# Patient Record
Sex: MALE | Race: WHITE | Employment: OTHER | URBAN - METROPOLITAN AREA
[De-identification: names, ages, dates, MRNs, and addresses within clinical notes are randomized per-mention and may not be internally consistent; named-entity substitution may affect disease eponyms.]

---

## 2017-03-28 PROBLEM — D47.2 MGUS (MONOCLONAL GAMMOPATHY OF UNKNOWN SIGNIFICANCE): Status: ACTIVE | Noted: 2017-03-28

## 2018-10-17 ENCOUNTER — HOSPITAL ENCOUNTER (OUTPATIENT)
Dept: CARDIOLOGY | Age: 78
Discharge: HOME | End: 2018-10-17
Payer: COMMERCIAL

## 2018-10-17 DIAGNOSIS — I38 VALVULAR HEART DISEASE: ICD-10-CM

## 2018-10-17 LAB
LV EF: 68 %
LVEF MODALITY: NORMAL

## 2018-10-17 PROCEDURE — 93306 TTE W/DOPPLER COMPLETE: CPT

## 2019-05-05 ENCOUNTER — APPOINTMENT (OUTPATIENT)
Dept: CT IMAGING | Age: 79
End: 2019-05-05
Payer: COMMERCIAL

## 2019-05-05 ENCOUNTER — APPOINTMENT (OUTPATIENT)
Dept: GENERAL RADIOLOGY | Age: 79
End: 2019-05-05
Payer: COMMERCIAL

## 2019-05-05 ENCOUNTER — HOSPITAL ENCOUNTER (OUTPATIENT)
Age: 79
Setting detail: OBSERVATION
Discharge: HOME | End: 2019-05-08
Attending: EMERGENCY MEDICINE | Admitting: INTERNAL MEDICINE
Payer: COMMERCIAL

## 2019-05-05 DIAGNOSIS — R55 NEAR SYNCOPE: Primary | ICD-10-CM

## 2019-05-05 DIAGNOSIS — R11.0 NAUSEA: ICD-10-CM

## 2019-05-05 LAB
ALBUMIN SERPL-MCNC: 3.9 G/DL (ref 3.5–5.2)
ALP BLD-CCNC: 81 U/L (ref 40–129)
ALT SERPL-CCNC: 24 U/L (ref 0–40)
ANION GAP SERPL CALCULATED.3IONS-SCNC: 10 MMOL/L (ref 7–16)
APTT: 27.4 SEC (ref 24.5–35.1)
AST SERPL-CCNC: 26 U/L (ref 0–39)
BACTERIA: ABNORMAL /HPF
BASOPHILS ABSOLUTE: 0.03 E9/L (ref 0–0.2)
BASOPHILS RELATIVE PERCENT: 0.5 % (ref 0–2)
BILIRUB SERPL-MCNC: 0.4 MG/DL (ref 0–1.2)
BILIRUBIN URINE: NEGATIVE
BLOOD, URINE: ABNORMAL
BUN BLDV-MCNC: 17 MG/DL (ref 8–23)
CALCIUM SERPL-MCNC: 8.8 MG/DL (ref 8.6–10.2)
CHLORIDE BLD-SCNC: 106 MMOL/L (ref 98–107)
CLARITY: CLEAR
CO2: 24 MMOL/L (ref 22–29)
COLOR: YELLOW
CREAT SERPL-MCNC: 0.8 MG/DL (ref 0.7–1.2)
EKG ATRIAL RATE: 62 BPM
EKG P AXIS: 23 DEGREES
EKG P-R INTERVAL: 170 MS
EKG Q-T INTERVAL: 442 MS
EKG QRS DURATION: 100 MS
EKG QTC CALCULATION (BAZETT): 448 MS
EKG R AXIS: 53 DEGREES
EKG T AXIS: 58 DEGREES
EKG VENTRICULAR RATE: 62 BPM
EOSINOPHILS ABSOLUTE: 0.06 E9/L (ref 0.05–0.5)
EOSINOPHILS RELATIVE PERCENT: 1.1 % (ref 0–6)
GFR AFRICAN AMERICAN: >60
GFR NON-AFRICAN AMERICAN: >60 ML/MIN/1.73
GLUCOSE BLD-MCNC: 139 MG/DL (ref 74–99)
GLUCOSE URINE: NEGATIVE MG/DL
HCT VFR BLD CALC: 39 % (ref 37–54)
HEMOGLOBIN: 13.3 G/DL (ref 12.5–16.5)
IMMATURE GRANULOCYTES #: 0.02 E9/L
IMMATURE GRANULOCYTES %: 0.4 % (ref 0–5)
INR BLD: 1.1
KETONES, URINE: NEGATIVE MG/DL
LACTIC ACID: 1.7 MMOL/L (ref 0.5–2.2)
LEUKOCYTE ESTERASE, URINE: NEGATIVE
LYMPHOCYTES ABSOLUTE: 1.11 E9/L (ref 1.5–4)
LYMPHOCYTES RELATIVE PERCENT: 20.1 % (ref 20–42)
MAGNESIUM: 1.9 MG/DL (ref 1.6–2.6)
MCH RBC QN AUTO: 32.4 PG (ref 26–35)
MCHC RBC AUTO-ENTMCNC: 34.1 % (ref 32–34.5)
MCV RBC AUTO: 95.1 FL (ref 80–99.9)
MONOCYTES ABSOLUTE: 0.4 E9/L (ref 0.1–0.95)
MONOCYTES RELATIVE PERCENT: 7.2 % (ref 2–12)
NEUTROPHILS ABSOLUTE: 3.91 E9/L (ref 1.8–7.3)
NEUTROPHILS RELATIVE PERCENT: 70.7 % (ref 43–80)
NITRITE, URINE: NEGATIVE
PDW BLD-RTO: 13 FL (ref 11.5–15)
PH UA: 6 (ref 5–9)
PLATELET # BLD: 148 E9/L (ref 130–450)
PMV BLD AUTO: 10.8 FL (ref 7–12)
POTASSIUM SERPL-SCNC: 4.2 MMOL/L (ref 3.5–5)
PRO-BNP: 453 PG/ML (ref 0–450)
PROTEIN UA: 30 MG/DL
PROTHROMBIN TIME: 12.5 SEC (ref 9.3–12.4)
RBC # BLD: 4.1 E12/L (ref 3.8–5.8)
RBC UA: ABNORMAL /HPF (ref 0–2)
SODIUM BLD-SCNC: 140 MMOL/L (ref 132–146)
SPECIFIC GRAVITY UA: 1.02 (ref 1–1.03)
TOTAL PROTEIN: 7 G/DL (ref 6.4–8.3)
TROPONIN: <0.01 NG/ML (ref 0–0.03)
TROPONIN: <0.01 NG/ML (ref 0–0.03)
UROBILINOGEN, URINE: 0.2 E.U./DL
WBC # BLD: 5.5 E9/L (ref 4.5–11.5)
WBC UA: ABNORMAL /HPF (ref 0–5)

## 2019-05-05 PROCEDURE — 83880 ASSAY OF NATRIURETIC PEPTIDE: CPT

## 2019-05-05 PROCEDURE — 36415 COLL VENOUS BLD VENIPUNCTURE: CPT

## 2019-05-05 PROCEDURE — 83605 ASSAY OF LACTIC ACID: CPT

## 2019-05-05 PROCEDURE — 93010 ELECTROCARDIOGRAM REPORT: CPT | Performed by: INTERNAL MEDICINE

## 2019-05-05 PROCEDURE — 85730 THROMBOPLASTIN TIME PARTIAL: CPT

## 2019-05-05 PROCEDURE — 81001 URINALYSIS AUTO W/SCOPE: CPT

## 2019-05-05 PROCEDURE — 85025 COMPLETE CBC W/AUTO DIFF WBC: CPT

## 2019-05-05 PROCEDURE — 70450 CT HEAD/BRAIN W/O DYE: CPT

## 2019-05-05 PROCEDURE — 71045 X-RAY EXAM CHEST 1 VIEW: CPT

## 2019-05-05 PROCEDURE — 6370000000 HC RX 637 (ALT 250 FOR IP): Performed by: INTERNAL MEDICINE

## 2019-05-05 PROCEDURE — 96361 HYDRATE IV INFUSION ADD-ON: CPT

## 2019-05-05 PROCEDURE — G0378 HOSPITAL OBSERVATION PER HR: HCPCS

## 2019-05-05 PROCEDURE — 84484 ASSAY OF TROPONIN QUANT: CPT

## 2019-05-05 PROCEDURE — 83735 ASSAY OF MAGNESIUM: CPT

## 2019-05-05 PROCEDURE — 99285 EMERGENCY DEPT VISIT HI MDM: CPT

## 2019-05-05 PROCEDURE — 80053 COMPREHEN METABOLIC PANEL: CPT

## 2019-05-05 PROCEDURE — 6360000002 HC RX W HCPCS: Performed by: PHYSICIAN ASSISTANT

## 2019-05-05 PROCEDURE — 85610 PROTHROMBIN TIME: CPT

## 2019-05-05 PROCEDURE — 2580000003 HC RX 258: Performed by: PHYSICIAN ASSISTANT

## 2019-05-05 PROCEDURE — 93005 ELECTROCARDIOGRAM TRACING: CPT | Performed by: PHYSICIAN ASSISTANT

## 2019-05-05 PROCEDURE — 96374 THER/PROPH/DIAG INJ IV PUSH: CPT

## 2019-05-05 RX ORDER — 0.9 % SODIUM CHLORIDE 0.9 %
500 INTRAVENOUS SOLUTION INTRAVENOUS ONCE
Status: COMPLETED | OUTPATIENT
Start: 2019-05-05 | End: 2019-05-05

## 2019-05-05 RX ORDER — COLCHICINE 0.6 MG/1
0.6 TABLET ORAL PRN
Status: DISCONTINUED | OUTPATIENT
Start: 2019-05-05 | End: 2019-05-08 | Stop reason: HOSPADM

## 2019-05-05 RX ORDER — ONDANSETRON 2 MG/ML
4 INJECTION INTRAMUSCULAR; INTRAVENOUS ONCE
Status: COMPLETED | OUTPATIENT
Start: 2019-05-05 | End: 2019-05-05

## 2019-05-05 RX ORDER — ONDANSETRON 2 MG/ML
4 INJECTION INTRAMUSCULAR; INTRAVENOUS ONCE
Status: DISCONTINUED | OUTPATIENT
Start: 2019-05-05 | End: 2019-05-08 | Stop reason: HOSPADM

## 2019-05-05 RX ORDER — AMLODIPINE BESYLATE 5 MG/1
5 TABLET ORAL DAILY
Status: DISCONTINUED | OUTPATIENT
Start: 2019-05-05 | End: 2019-05-05

## 2019-05-05 RX ORDER — AMLODIPINE BESYLATE 5 MG/1
5 TABLET ORAL 2 TIMES DAILY
Status: DISCONTINUED | OUTPATIENT
Start: 2019-05-05 | End: 2019-05-06

## 2019-05-05 RX ORDER — LISINOPRIL 20 MG/1
20 TABLET ORAL DAILY
Status: DISCONTINUED | OUTPATIENT
Start: 2019-05-05 | End: 2019-05-08 | Stop reason: HOSPADM

## 2019-05-05 RX ORDER — M-VIT,TX,IRON,MINS/CALC/FOLIC 27MG-0.4MG
1 TABLET ORAL DAILY
Status: DISCONTINUED | OUTPATIENT
Start: 2019-05-05 | End: 2019-05-08 | Stop reason: HOSPADM

## 2019-05-05 RX ORDER — LORATADINE 10 MG/1
10 TABLET ORAL PRN
COMMUNITY
End: 2020-10-09

## 2019-05-05 RX ORDER — CETIRIZINE HYDROCHLORIDE 10 MG/1
5 TABLET ORAL DAILY
Status: DISCONTINUED | OUTPATIENT
Start: 2019-05-05 | End: 2019-05-08 | Stop reason: HOSPADM

## 2019-05-05 RX ADMIN — AMLODIPINE BESYLATE 5 MG: 5 TABLET ORAL at 23:03

## 2019-05-05 RX ADMIN — SODIUM CHLORIDE 500 ML: 9 INJECTION, SOLUTION INTRAVENOUS at 14:29

## 2019-05-05 RX ADMIN — ONDANSETRON HYDROCHLORIDE 4 MG: 2 SOLUTION INTRAMUSCULAR; INTRAVENOUS at 14:29

## 2019-05-05 ASSESSMENT — PAIN SCALES - GENERAL
PAINLEVEL_OUTOF10: 0
PAINLEVEL_OUTOF10: 0

## 2019-05-05 NOTE — ED PROVIDER NOTES
Seen with attending:      HPI:  5/5/19, Time: 24 Paxton Rivas is a 78 y.o. male presenting to the ED for dizziness with near sycope, beginning when bending over outdoors today. The complaint has been persistent, moderate in severity, and worsened when bending forward. He did have some nausea, but no vomiting. He reports that he had breakfast this morning and was outdoors with his grandchildren picking weeds when he bent over slightly and he felt dizzy. He states he went towards the house and leaned against the house and he felt like everything was spinning. He states he did not fall and strike his head. He reports he has never had anything like this in the past. He states that earlier this week, he was feeling somewhat \"weak\". He is not take blood thinners. He is a diabetic and does have hypertension. He does have a history of a heart murmur and did have an echo within the last year. He reports a history of known coronary disease and denies having any chest discomfort or shortness of breath today with his symptoms. He does admit to drinking some alcohol daily and he does not smoke tobacco.    ROS:   Pertinent positives and negatives are stated within the HPI, all other systems reviewed and are negative.    --------------------------------------------- PAST HISTORY ---------------------------------------------  Past Medical History:  has a past medical history of Diabetes mellitus ([facility] [address] Erectile dysfunction, Gout, Hypertension, and Vitamin D deficiency. Past Surgical History:  has a past surgical history that includes Appendectomy (2982); Tonsillectomy; Cataract removal with implant (Left, 6/6/13); hernia repair (1970); and Cataract removal with implant (10/31/13). Social History:  reports that he has quit smoking. He quit after 15.00 years of use. He quit smokeless tobacco use about 48 years ago. He reports that he drinks alcohol. He reports that he does not use drugs.     Family History: family history is not on file. The patients home medications have been reviewed. Allergies: Patient has no known allergies. ---------------------------------------------------PHYSICAL EXAM--------------------------------------    Constitutional/General: Alert and oriented x3, stable and well appearing, non toxic in NAD  Head: NC/AT  Eyes: PERRL, EOMI, no nystagmus. Ears and Nose: no discharge or bleeding  Mouth: Oropharynx clear, tongue midline, handling secretions, no trismus  Neck: Supple, full ROM, unable to reproduce symptoms of dizziness flexion/extension or lateral rotation. Pulmonary: Lungs clear to auscultation bilaterally, no rhonchi or wheezes. Not in respiratory distress  Cardiovascular:  Regular rate. Regular rhythm. 3/6 systolic murmur noted. 2+ distal pulses  Chest: no chest wall tenderness  Abdomen: Soft. Non tender. Non distended. +BS. No rebound or guarding. No pulsatile masses appreciated. Musculoskeletal: Moves all extremities x 4. Warm and well perfused, no pitting edema. Capillary refill <3 seconds  Skin: warm and dry. No rashes. Neurologic: GCS 15, CN 2-12 grossly intact, no focal deficits, symmetric strength 5/5 in the upper and lower extremities bilaterally  Psych: Normal Affect    NIH Stroke Scale/Score at time of initial evaluation:  1A: Level of Consciousness 0 - alert; keenly responsive   1B: Ask Month and Age 0 - answers both questions correctly   1C:  Tell Patient To Open and Close Eyes, then Hand  Squeeze 0 - performs both tasks correctly   2: Test Horizontal Extraocular Movements 0 - normal   3: Test Visual Fields 0 - no visual loss   4: Test Facial Palsy 0 - normal symmetric movement   5A: Test Left Arm Motor Drift 0 - no drift, limb holds 90 (or 45) degrees for full 10 seconds   5B: Test Right Arm Motor Drift 0 - no drift, limb holds 90 (or 45) degrees for full 10 seconds   6A: Test Left Leg Motor Drift 0 - no drift; leg holds 30 degree position for full 5 seconds 6B: Test Right Leg Motor Drift 0 - no drift; leg holds 30 degree position for full 5 seconds   7: Test Limb Ataxia   (FNF/Heel-Shin) 0 - absent   8: Test Sensation 0 - normal; no sensory loss   9: Test Language/Aphasia 0 - no aphasia, normal   10: Test Dysarthria 0 - normal   11: Test Extinction/Inattention 0 - no abnormality   Total 0       -------------------------------------------------- RESULTS -------------------------------------------------  I have personally reviewed all laboratory and imaging results for this patient. Results are listed below.      LABS:  Results for orders placed or performed during the hospital encounter of 05/05/19   CBC auto differential   Result Value Ref Range    WBC 5.5 4.5 - 11.5 E9/L    RBC 4.10 3.80 - 5.80 E12/L    Hemoglobin 13.3 12.5 - 16.5 g/dL    Hematocrit 39.0 37.0 - 54.0 %    MCV 95.1 80.0 - 99.9 fL    MCH 32.4 26.0 - 35.0 pg    MCHC 34.1 32.0 - 34.5 %    RDW 13.0 11.5 - 15.0 fL    Platelets 640 766 - 301 E9/L    MPV 10.8 7.0 - 12.0 fL    Neutrophils % 70.7 43.0 - 80.0 %    Immature Granulocytes % 0.4 0.0 - 5.0 %    Lymphocytes % 20.1 20.0 - 42.0 %    Monocytes % 7.2 2.0 - 12.0 %    Eosinophils % 1.1 0.0 - 6.0 %    Basophils % 0.5 0.0 - 2.0 %    Neutrophils # 3.91 1.80 - 7.30 E9/L    Immature Granulocytes # 0.02 E9/L    Lymphocytes # 1.11 (L) 1.50 - 4.00 E9/L    Monocytes # 0.40 0.10 - 0.95 E9/L    Eosinophils # 0.06 0.05 - 0.50 E9/L    Basophils # 0.03 0.00 - 0.20 E9/L   Comprehensive Metabolic Panel   Result Value Ref Range    Sodium 140 132 - 146 mmol/L    Potassium 4.2 3.5 - 5.0 mmol/L    Chloride 106 98 - 107 mmol/L    CO2 24 22 - 29 mmol/L    Anion Gap 10 7 - 16 mmol/L    Glucose 139 (H) 74 - 99 mg/dL    BUN 17 8 - 23 mg/dL    CREATININE 0.8 0.7 - 1.2 mg/dL    GFR Non-African American >60 >=60 mL/min/1.73    GFR African American >60     Calcium 8.8 8.6 - 10.2 mg/dL    Total Protein 7.0 6.4 - 8.3 g/dL    Alb 3.9 3.5 - 5.2 g/dL    Total Bilirubin 0.4 0.0 - 1.2 mg/dL Alkaline Phosphatase 81 40 - 129 U/L    ALT 24 0 - 40 U/L    AST 26 0 - 39 U/L   Lactic Acid, Plasma   Result Value Ref Range    Lactic Acid 1.7 0.5 - 2.2 mmol/L   Magnesium   Result Value Ref Range    Magnesium 1.9 1.6 - 2.6 mg/dL   Troponin   Result Value Ref Range    Troponin <0.01 0.00 - 0.03 ng/mL   Brain Natriuretic Peptide   Result Value Ref Range    Pro- (H) 0 - 450 pg/mL   Protime-INR   Result Value Ref Range    Protime 12.5 (H) 9.3 - 12.4 sec    INR 1.1    APTT   Result Value Ref Range    aPTT 27.4 24.5 - 35.1 sec   EKG 12 Lead   Result Value Ref Range    Ventricular Rate 62 BPM    Atrial Rate 62 BPM    P-R Interval 170 ms    QRS Duration 100 ms    Q-T Interval 442 ms    QTc Calculation (Bazett) 448 ms    P Axis 23 degrees    R Axis 53 degrees    T Axis 58 degrees       RADIOLOGY:  Interpreted by Radiologist.  CT HEAD WO CONTRAST   Final Result      No evidence for acute intracranial process. Cortical atrophy and chronic periventricular microangiopathy. XR CHEST PORTABLE   Final Result      NO ACUTE CARDIOPULMONARY PROCESS             EKG:  This EKG is signed and interpreted by the EP. Time: 1410  Rate: 62  Rhythm: Sinus and occasional PACs  Interpretation: non-specific EKG  Comparison: no previous EKG      ------------------------- NURSING NOTES AND VITALS REVIEWED ---------------------------   The nursing notes within the ED encounter and vital signs as below have been reviewed by myself. BP (!) 186/82   Pulse 61   Temp 97.1 °F (36.2 °C) (Temporal)   Resp 16   Ht 6' 3\" (1.905 m)   Wt 208 lb 1.6 oz (94.4 kg)   SpO2 97%   BMI 26.01 kg/m²   Oxygen Saturation Interpretation: Normal    The patients available past medical records and past encounters were reviewed.       ------------------------------ ED COURSE/MEDICAL DECISION MAKING----------------------  Medications   ondansetron (ZOFRAN) injection 4 mg (has no administration in time range)   0.9 % sodium chloride bolus (500 mLs Intravenous New Bag 5/5/19 1429)   ondansetron (ZOFRAN) injection 4 mg (4 mg Intravenous Given 5/5/19 1429)         Medical Decision Making:    Patient to ER with complaints of dizziness and nausea when bending over outdoors today. Re-Evaluations:             Re-evaluation. Patients symptoms stable  Patient  advised of need to check labs, urine as well as CT imaging and CXR. IVF and Zofran given, will monitor. Patient orthostatic vitals noted and stable. 4PM Patient states he is feeling improved after 2nd dose of Zofran given in ER. Patient having no episodes of vomiting noted. Patient advised of stable CT findings as well as laboratory studies. Family concerned as they feel his abdomen is bloated, patient states his abdomen is fine and he is having no pain at this time, but still continues to complain of nausea and feeling weak. Patient agrees with admission for observation and family agrees. Consultations:             Dr. Max Reyes spoke with Dr. Nuria Aguirre and he agrees with admission for observation. This patient's ED course included: a personal history and physicial examination, re-evaluation prior to disposition, multiple bedside re-evaluations, IV medications, cardiac monitoring, continuous pulse oximetry and complex medical decision making and emergency management    This patient has remained hemodynamically stable during their ED course. Counseling: The emergency provider has spoken with the patient and family members and discussed todays results, in addition to providing specific details for the plan of care and counseling regarding the diagnosis and prognosis. Questions are answered at this time and they are agreeable with the plan.     --------------------------------- IMPRESSION AND DISPOSITION ---------------------------------    IMPRESSION  1. Near syncope    2.  Nausea        DISPOSITION  Disposition: Admit to telemetry  Patient condition is stable          Shaan Sr

## 2019-05-06 LAB
ALBUMIN SERPL-MCNC: 3.9 G/DL (ref 3.5–5.2)
ALP BLD-CCNC: 74 U/L (ref 40–129)
ALT SERPL-CCNC: 20 U/L (ref 0–40)
ANION GAP SERPL CALCULATED.3IONS-SCNC: 11 MMOL/L (ref 7–16)
AST SERPL-CCNC: 20 U/L (ref 0–39)
BILIRUB SERPL-MCNC: 0.5 MG/DL (ref 0–1.2)
BUN BLDV-MCNC: 15 MG/DL (ref 8–23)
CALCIUM SERPL-MCNC: 9.1 MG/DL (ref 8.6–10.2)
CHLORIDE BLD-SCNC: 104 MMOL/L (ref 98–107)
CO2: 25 MMOL/L (ref 22–29)
CREAT SERPL-MCNC: 1 MG/DL (ref 0.7–1.2)
GFR AFRICAN AMERICAN: >60
GFR NON-AFRICAN AMERICAN: >60 ML/MIN/1.73
GLUCOSE BLD-MCNC: 102 MG/DL (ref 74–99)
HCT VFR BLD CALC: 38.9 % (ref 37–54)
HEMOGLOBIN: 13 G/DL (ref 12.5–16.5)
MCH RBC QN AUTO: 32.5 PG (ref 26–35)
MCHC RBC AUTO-ENTMCNC: 33.4 % (ref 32–34.5)
MCV RBC AUTO: 97.3 FL (ref 80–99.9)
PDW BLD-RTO: 13 FL (ref 11.5–15)
PLATELET # BLD: 155 E9/L (ref 130–450)
PMV BLD AUTO: 10.7 FL (ref 7–12)
POTASSIUM SERPL-SCNC: 4.1 MMOL/L (ref 3.5–5)
RBC # BLD: 4 E12/L (ref 3.8–5.8)
SODIUM BLD-SCNC: 140 MMOL/L (ref 132–146)
TOTAL PROTEIN: 6.6 G/DL (ref 6.4–8.3)
TROPONIN: <0.01 NG/ML (ref 0–0.03)
WBC # BLD: 8 E9/L (ref 4.5–11.5)

## 2019-05-06 PROCEDURE — 85027 COMPLETE CBC AUTOMATED: CPT

## 2019-05-06 PROCEDURE — 80053 COMPREHEN METABOLIC PANEL: CPT

## 2019-05-06 PROCEDURE — 36415 COLL VENOUS BLD VENIPUNCTURE: CPT

## 2019-05-06 PROCEDURE — 6370000000 HC RX 637 (ALT 250 FOR IP): Performed by: INTERNAL MEDICINE

## 2019-05-06 PROCEDURE — 84484 ASSAY OF TROPONIN QUANT: CPT

## 2019-05-06 PROCEDURE — APPSS45 APP SPLIT SHARED TIME 31-45 MINUTES: Performed by: NURSE PRACTITIONER

## 2019-05-06 PROCEDURE — G0378 HOSPITAL OBSERVATION PER HR: HCPCS

## 2019-05-06 RX ORDER — NIFEDIPINE 30 MG/1
30 TABLET, EXTENDED RELEASE ORAL DAILY
Status: DISCONTINUED | OUTPATIENT
Start: 2019-05-06 | End: 2019-05-08 | Stop reason: HOSPADM

## 2019-05-06 RX ADMIN — AMLODIPINE BESYLATE 5 MG: 5 TABLET ORAL at 21:32

## 2019-05-06 RX ADMIN — CETIRIZINE HYDROCHLORIDE 5 MG: 10 TABLET, FILM COATED ORAL at 09:21

## 2019-05-06 RX ADMIN — MULTIPLE VITAMINS W/ MINERALS TAB 1 TABLET: TAB at 09:20

## 2019-05-06 RX ADMIN — AMLODIPINE BESYLATE 5 MG: 5 TABLET ORAL at 09:20

## 2019-05-06 RX ADMIN — METFORMIN HYDROCHLORIDE 500 MG: 500 TABLET ORAL at 09:21

## 2019-05-06 RX ADMIN — VITAMIN D, TAB 1000IU (100/BT) 1000 UNITS: 25 TAB at 09:20

## 2019-05-06 RX ADMIN — LISINOPRIL 20 MG: 20 TABLET ORAL at 09:21

## 2019-05-06 RX ADMIN — NIFEDIPINE 30 MG: 30 TABLET, FILM COATED, EXTENDED RELEASE ORAL at 23:10

## 2019-05-06 ASSESSMENT — PAIN SCALES - GENERAL
PAINLEVEL_OUTOF10: 0

## 2019-05-06 NOTE — PLAN OF CARE
Problem: Falls - Risk of:  Goal: Will remain free from falls  Description  Will remain free from falls  5/5/2019 2205 by Glynn Membreno RN  Outcome: Met This Shift  5/5/2019 1901 by Toni Reyes RN  Outcome: Met This Shift     Problem: Safety:  Goal: Free from accidental physical injury  Description  Free from accidental physical injury  Outcome: Met This Shift

## 2019-05-06 NOTE — H&P
Guicho Dean is a 78 y.o. male  Chief Complaint   Patient presents with    Dizziness     and nausea beginning today, near syncopal episode while bending over outside; given Zofran 4mg PTA     HPI  As above, this am he is feeling better, asking to go home. He has no cp, sob, n/v.  No current facility-administered medications on file prior to encounter.       Current Outpatient Medications on File Prior to Encounter   Medication Sig Dispense Refill    vitamin D (CHOLECALCIFEROL) 1000 UNIT TABS tablet Take 1,000 Units by mouth daily      loratadine (CLARITIN) 10 MG tablet Take 10 mg by mouth as needed      metFORMIN (GLUCOPHAGE) 500 MG tablet Take 1 tablet by mouth daily (with breakfast) 90 tablet 3    lisinopril (PRINIVIL;ZESTRIL) 20 MG tablet Take 1 tablet by mouth daily Indications: AM Instructed to take with sip water am of procedure 90 tablet 3    colchicine (COLCRYS) 0.6 MG tablet Take 1 tablet by mouth daily (Patient taking differently: Take 0.6 mg by mouth as needed ) 30 tablet 3    amLODIPine (NORVASC) 5 MG tablet Take 1 tablet by mouth daily Indications: AM Instructed to take with sip water am of procedure 90 tablet 3    Multiple Vitamins-Minerals (THERAPEUTIC MULTIVITAMIN-MINERALS) tablet Take 1 tablet by mouth daily      metroNIDAZOLE (METROGEL) 0.75 % gel apply topically 2 times daily 45 g 1     Past Medical History:   Diagnosis Date    Diabetes mellitus ([address]     states glucose is running normal    Erectile dysfunction     Gout     Hypertension     states doing well    Vitamin D deficiency        ROS  Patient positive for  syncope  Patient denies any fever, chills, night sweats, weight changes   Denies headache, visual changes,   Denies dysphagia, odynophagia dysphonia,   Denies SOB, cough, sputum production,   Denies chest pain, pressure, orthopnea, palpitations,   Denies abd pain, N/V/D/C/melena, hematochezia,   Denies urinary frequency, urgency, dysuria, hematuria,   Denies any acute muscle aches, paresthesias, weakness, seizure,  Denies depression, anxiety. OBJECTIVE  Vitals:    05/06/19 0000   BP: (!) 140/70   Pulse: 61   Resp: 18   Temp: 98.2 °F (36.8 °C)   SpO2: 96%     Gen: AO3, NAD  Head: AT/NC, PERRL, EOMIx2, no icterus, conjunctival injection  Neck: No JVD, carotid bruits, LAD, thyroid non-palpable  Heart: RRR with no murmurs, rubs, gallops  Lungs: CTA B/L, no W/R/R  Abd: soft, NT, ND, BS+, no G/R, no HSM  Ext: No C/C/E, pulses intact distally B/L  Neuro: CN 2-12 grossly intact with no focal deficits    ASSESSMENT  1. Near syncope    2. Nausea    3.  HTN  4. DM2  PLAN  Will see how he does with ambulating

## 2019-05-06 NOTE — PLAN OF CARE
Problem: Falls - Risk of:  Goal: Will remain free from falls  Description  Will remain free from falls  Outcome: Met This Shift     Problem: Daily Care:  Goal: Daily care needs are met  Description  Daily care needs are met  Outcome: Met This Shift

## 2019-05-06 NOTE — PROGRESS NOTES
Jered sent to [facility] cardiology for new consult. Currently unaware of why Dr. Araceli Ardon was removed from care team as he was consulted yesterday.

## 2019-05-06 NOTE — PLAN OF CARE
Problem: Falls - Risk of:  Goal: Will remain free from falls  Description  Will remain free from falls  5/6/2019 0052 by J Carlos Khanna RN  Outcome: Met This Shift  5/5/2019 2205 by Macy Ayala RN  Outcome: Met This Shift  5/5/2019 1901 by Elisa Johns RN  Outcome: Met This Shift  Goal: Absence of physical injury  Description  Absence of physical injury  Outcome: Met This Shift     Problem: Safety:  Goal: Free from accidental physical injury  Description  Free from accidental physical injury  5/6/2019 0052 by J Carlos Khanna RN  Outcome: Met This Shift  5/5/2019 2205 by Macy Ayala RN  Outcome: Met This Shift  Goal: Free from intentional harm  Description  Free from intentional harm  Outcome: Met This Shift     Problem: Daily Care:  Goal: Daily care needs are met  Description  Daily care needs are met  Outcome: Met This Shift     Problem: Skin Integrity:  Goal: Skin integrity will stabilize  Description  Skin integrity will stabilize  Outcome: Met This Shift

## 2019-05-06 NOTE — CONSULTS
mg, 5 mg, Oral, Daily  metFORMIN (GLUCOPHAGE) tablet 500 mg, 500 mg, Oral, Daily with breakfast  therapeutic multivitamin-minerals 1 tablet, 1 tablet, Oral, Daily  vitamin D (CHOLECALCIFEROL) tablet 1,000 Units, 1,000 Units, Oral, Daily  colchicine (COLCRYS) tablet 0.6 mg, 0.6 mg, Oral, PRN  amLODIPine (NORVASC) tablet 5 mg, 5 mg, Oral, BID    Allergies:  Patient has no known allergies. REVIEW OF SYSTEMS:     · Constitutional: Denies fatigue, fevers, chills or night sweats  · Eyes: Denies visual changes or drainage  · ENT: Denies headaches or hearing loss. No mouth sores or sore throat. No epistaxis   · Cardiovascular: Denies chest pain, pressure or palpitations. No lower extremity swelling. · Respiratory: Denies ARREOLA, cough, orthopnea or PND. No hemoptysis   · Gastrointestinal: Denies hematemesis or anorexia. No hematochezia or melena    · Genitourinary: Denies urgency, dysuria or hematuria. · Musculoskeletal: Denies gait disturbance, weakness or joint complaints  · Integumentary: Denies rash, hives or pruritis   · Neurological: Denies dizziness, headaches or seizures. No numbness or tingling  · Psychiatric: Denies anxiety or depression. · Endocrine: Denies temperature intolerance. No recent weight change. .  · Hematologic/Lymphatic: Denies abnormal bruising or bleeding. No swollen lymph nodes    PHYSICAL EXAM:   BP (!) 161/73   Pulse 60   Temp 97.7 °F (36.5 °C) (Temporal)   Resp 18   Ht 6' 3\" (1.905 m)   Wt 205 lb 4.8 oz (93.1 kg)   SpO2 97%   BMI 25.66 kg/m²   CONST:  Well developed, well nourished who appears of stated age. Awake, alert and cooperative. No apparent distress. HEENT:   Head- Normocephalic, atraumatic   Eyes- Conjunctivae pink, anicteric  Throat- Oral mucosa pink and moist  Neck-  No stridor, trachea midline, no jugular venous distention. Positive for left carotid bruit. CHEST: Chest symmetrical and non-tender to palpation.  No accessory muscle use or intercostal retractions  RESPIRATORY: Lung sounds - clear throughout fields   CARDIOVASCULAR:     Heart Inspection- shows no noted pulsations  Heart Palpation- no heaves or thrills; PMI is non-displaced    Heart Ausculation- Regular rate and rhythm, 2/6 systolic murmur. No s3, s4 or rub   PV: No lower extremity edema. No varicosities. Pedal pulses palpable, no clubbing or cyanosis   ABDOMEN: Soft, non-tender to light palpation. Bowel sounds present. No palpable masses no organomegaly; no abdominal bruit  MS: Good muscle strength and tone. No atrophy or abnormal movements. : Deferred  SKIN: Warm and dry no statis dermatitis or ulcers   NEURO / PSYCH: Oriented to person, place and time. Speech clear and appropriate. Follows all commands. Pleasant affect     DATA:    ECG / Tele strips: sinusbrady  Diagnostic:      Intake/Output Summary (Last 24 hours) at 5/6/2019 1505  Last data filed at 5/6/2019 1405  Gross per 24 hour   Intake 660 ml   Output 900 ml   Net -240 ml       Labs:   CBC:   Recent Labs     05/05/19  1423 05/06/19  0601   WBC 5.5 8.0   HGB 13.3 13.0   HCT 39.0 38.9    155     BMP:   Recent Labs     05/05/19  1423 05/06/19  0601    140   K 4.2 4.1   CO2 24 25   BUN 17 15   CREATININE 0.8 1.0   LABGLOM >60 >60   CALCIUM 8.8 9.1     Mag:   Recent Labs     05/05/19  1423   MG 1.9     Phos: No results for input(s): PHOS in the last 72 hours. TSH: No results for input(s): TSH in the last 72 hours.   HgA1c:   Lab Results   Component Value Date    LABA1C 5.7 04/05/2019     No results found for: EAG  proBNP:   Recent Labs     05/05/19  1423   PROBNP 453*     PT/INR:   Recent Labs     05/05/19  1423   PROTIME 12.5*   INR 1.1     APTT:  Recent Labs     05/05/19  1423   APTT 27.4     CARDIAC ENZYMES:  Recent Labs     05/05/19  1423 05/05/19  1846 05/06/19  0601   TROPONINI <0.01 <0.01 <0.01     FASTING LIPID PANEL:  Lab Results   Component Value Date    CHOL 210 04/18/2018    HDL 69 04/05/2019    LDLCALC 109 04/18/2018    TRIG 72 04/05/2019     LIVER PROFILE:  Recent Labs     05/05/19  1423 05/06/19  0601   AST 26 20   ALT 24 20   LABALBU 3.9 3.9       Electronically signed by DESMOND Suarez CNP on 5/6/2019 at 3:05 PM     ASSESSMENT AND PLAN BY DR. Ebonie Reeves

## 2019-05-07 ENCOUNTER — APPOINTMENT (OUTPATIENT)
Dept: NUCLEAR MEDICINE | Age: 79
End: 2019-05-07
Payer: COMMERCIAL

## 2019-05-07 LAB
HCT VFR BLD CALC: 36.1 % (ref 37–54)
HEMOGLOBIN: 12.4 G/DL (ref 12.5–16.5)
LV EF: 59 %
LV EF: 64 %
LVEF MODALITY: NORMAL
LVEF MODALITY: NORMAL
MCH RBC QN AUTO: 32.8 PG (ref 26–35)
MCHC RBC AUTO-ENTMCNC: 34.3 % (ref 32–34.5)
MCV RBC AUTO: 95.5 FL (ref 80–99.9)
PDW BLD-RTO: 12.7 FL (ref 11.5–15)
PLATELET # BLD: 138 E9/L (ref 130–450)
PMV BLD AUTO: 11.1 FL (ref 7–12)
RBC # BLD: 3.78 E12/L (ref 3.8–5.8)
WBC # BLD: 6.5 E9/L (ref 4.5–11.5)

## 2019-05-07 PROCEDURE — 85027 COMPLETE CBC AUTOMATED: CPT

## 2019-05-07 PROCEDURE — 6370000000 HC RX 637 (ALT 250 FOR IP): Performed by: INTERNAL MEDICINE

## 2019-05-07 PROCEDURE — G0378 HOSPITAL OBSERVATION PER HR: HCPCS

## 2019-05-07 PROCEDURE — 6360000002 HC RX W HCPCS: Performed by: INTERNAL MEDICINE

## 2019-05-07 PROCEDURE — 78452 HT MUSCLE IMAGE SPECT MULT: CPT

## 2019-05-07 PROCEDURE — 36415 COLL VENOUS BLD VENIPUNCTURE: CPT

## 2019-05-07 PROCEDURE — 93017 CV STRESS TEST TRACING ONLY: CPT

## 2019-05-07 PROCEDURE — 93306 TTE W/DOPPLER COMPLETE: CPT

## 2019-05-07 PROCEDURE — A9500 TC99M SESTAMIBI: HCPCS | Performed by: RADIOLOGY

## 2019-05-07 PROCEDURE — 3430000000 HC RX DIAGNOSTIC RADIOPHARMACEUTICAL: Performed by: RADIOLOGY

## 2019-05-07 RX ADMIN — METFORMIN HYDROCHLORIDE 500 MG: 500 TABLET ORAL at 11:00

## 2019-05-07 RX ADMIN — MULTIPLE VITAMINS W/ MINERALS TAB 1 TABLET: TAB at 10:59

## 2019-05-07 RX ADMIN — Medication 11 MILLICURIE: at 12:00

## 2019-05-07 RX ADMIN — CETIRIZINE HYDROCHLORIDE 5 MG: 10 TABLET, FILM COATED ORAL at 11:00

## 2019-05-07 RX ADMIN — NIFEDIPINE 30 MG: 30 TABLET, FILM COATED, EXTENDED RELEASE ORAL at 10:59

## 2019-05-07 RX ADMIN — LISINOPRIL 20 MG: 20 TABLET ORAL at 11:00

## 2019-05-07 RX ADMIN — REGADENOSON 0.4 MG: 0.08 INJECTION, SOLUTION INTRAVENOUS at 14:05

## 2019-05-07 RX ADMIN — VITAMIN D, TAB 1000IU (100/BT) 1000 UNITS: 25 TAB at 10:59

## 2019-05-07 ASSESSMENT — PAIN SCALES - GENERAL
PAINLEVEL_OUTOF10: 0

## 2019-05-07 NOTE — PROGRESS NOTES
Dr. Sepideh Stewart notified of patient's elevated blood pressure while he was rounding on the patient.

## 2019-05-07 NOTE — PROGRESS NOTES
Call returned from Dr Kirti Chaudhari. Echo/stress results reviewed.  Per pt preference/Dr Kirti Chaudhari, pt will d/c tomorrow  Chandler Last RN  4:41 PM WDL

## 2019-05-07 NOTE — CARE COORDINATION
Met with pt and wife at bedside to discuss discharge planning / transition of care. Pt states he is independent of all ADL, denies DME or needs, active , discharge plan is home with no needs. Pt's wife at bedside verified she will provide transportation home at discharge via phone [phone].

## 2019-05-07 NOTE — CARE COORDINATION
Shreya Garland charge RN to check for discharge with cardiology and PCP. Pt observational status greater than 48 hours.

## 2019-05-07 NOTE — CONSULTS
CARDIOLOGY CONSULTATION    Patient Name:  Nette Morris    :  1940    Reason for Consultation:   Near-syncope; heart murmur    History of Present Illness:   Nette Morris presents to [facility], following a history of near syncope and profound feeling of being off balance while pulling weeds with his grandchildren. He went into the house and was profusely diaphoretic but denied any chest discomfort. He became nauseated with subsequent emesis. En route to the hospital, he was given intravenous Zofran and while in the emergency room his symptoms seemed to improve. He is physically active and denies any abdomen. He denies any palpitations. He was recently noted to have a heart murmur in 2018. He has no previous cardiac history but is actively treated for hypertension. Additionally he has diabetes mellitus type II. Past Medical History:   has a past medical history of Diabetes mellitus ([address] Erectile dysfunction, Gout, Hypertension, and Vitamin D deficiency. Surgical History:   has a past surgical history that includes Appendectomy (44); Tonsillectomy; Cataract removal with implant (Left, 11); hernia repair (); and Cataract removal with implant (10/31/13). Social History:   reports that he has quit smoking. He quit after 15.00 years of use. He quit smokeless tobacco use about 48 years ago. He reports that he drinks alcohol. He reports that he does not use drugs. Family History:  family history is remarkable for his father and mother  secondary to old age. Father did have tremor. Medications:  Prior to Admission medications    Medication Sig Start Date End Date Taking?  Authorizing Provider   vitamin D (CHOLECALCIFEROL) 1000 UNIT TABS tablet Take 1,000 Units by mouth daily   Yes Historical Provider, MD   loratadine (CLARITIN) 10 MG tablet Take 10 mg by mouth as needed   Yes Historical Provider, MD   metFORMIN (GLUCOPHAGE) 500 MG tablet Take 1 tablet by mouth daily (with breakfast) 4/12/19  Yes Ceasar Del Castillo MD   lisinopril (PRINIVIL;ZESTRIL) 20 MG tablet Take 1 tablet by mouth daily Indications: AM Instructed to take with sip water am of procedure 4/12/19  Yes Ceasar Del Castillo MD   colchicine (COLCRYS) 0.6 MG tablet Take 1 tablet by mouth daily  Patient taking differently: Take 0.6 mg by mouth as needed  4/12/19  Yes Ceasar Del Castillo MD   amLODIPine (NORVASC) 5 MG tablet Take 1 tablet by mouth daily Indications: AM Instructed to take with sip water am of procedure 4/12/19  Yes Ceasar Del Castillo MD   Multiple Vitamins-Minerals (THERAPEUTIC MULTIVITAMIN-MINERALS) tablet Take 1 tablet by mouth daily   Yes Historical Provider, MD   metroNIDAZOLE (METROGEL) 0.75 % gel apply topically 2 times daily 4/12/19   Ceasar Del Castillo MD       Allergies:  Patient has no known allergies. Review of Systems:   · Constitutional: there has been no unanticipated weight loss. There's been a significant change in energy level, as noted by his wife No significant change in sleep pattern but somewhat less activity level. No fever chills or rigors. · Eyes: No visual changes or diplopia. No scleral icterus. · ENT: No Headaches, hearing loss or vertigo. No mouth sores or sore throat. No change in taste or smell. · Cardiovascular: No chest discomfort, dyspnea on exertion, palpitations, + near loss of consciousness, but no phlebitis, no claudication. · Respiratory: No cough or wheezing, no sputum production. No hemoptysis, pleuritic pain. · Gastrointestinal: No abdominal pain, appetite loss, blood in stools. No change in bowel habits. No hematemesis. \"  Nausea and emesis. · Genitourinary: No dysuria, trouble voiding or hematuria. No nocturia or increased frequency. · Musculoskeletal:  No gait disturbance, weakness or joint complaints. · Integumentary: No rash or pruritis.   · Neurological: No headache, diplopia, change in muscle strength, numbness or tingling. No change in gait, balance, coordination, mood, affect, memory, mentation, behavior. · Psychiatric: No anxiety or depression. · Endocrine: No temperature intolerance. No excessive thirst, fluid intake, or urination. No tremor. · Hematologic/Lymphatic: No abnormal bruising or bleeding, blood clots or swollen lymph nodes. · Allergic/Immunologic: No nasal congestion or hives. Physical Examination:    Vital Signs: BP (!) 169/77   Pulse 61   Temp 98.3 °F (36.8 °C) (Temporal)   Resp 18   Ht 6' 3\" (1.905 m)   Wt 205 lb 4.8 oz (93.1 kg)   SpO2 95%   BMI 25.66 kg/m²   General appearance: Well preserved, mesomorphic body habitus, alert, no distress. Skin: Skin color, texture, turgor normal. No rashes or lesions. No induration or tightening palpated. Head: Normocephalic. No masses, lesions, tenderness or abnormalities  Eyes: Conjunctivae/corneas clear. PERRL, EOMs intact. Sclera non icteric. Ears: External ears normal. Canals clear. TM's clear bilaterally. Hearing normal to finger rub. Nose/Sinuses: Nares normal. Septum midline. Mucosa normal. No drainage or sinus tenderness. Oropharynx: Lips, mucosa, and tongue normal. Oropharynx clear with no exudate seen. Neck: Neck supple and symmetric. No adenopathy. Thyroid symmetric, normal size, without nodules. Trachea is midline. Carotids brisk in upstroke without bruits, no abnormal JVP noted at 45°. Chest: Even excursion  Lungs: Lungs clear to auscultation bilaterally. No retractions or use of accessory muscles. No tactile vocal fremitus. No rhonchi, crackles or rales. Heart:  S1 > S2. Regular rhythm. No gallop; grade 2/6 systolic murmur 2nd right and left intercostal space as well as the apex. No rub, palpable thrill or heave noted. PMI 5th intercostal space midclavicular line. Abdomen: Abdomen soft, moderately protuberant, non-tender. BS normal. No masses, organomegaly. No hernia noted.   Extremities: Extremities normal. No deformities, edema, or skin discoloration. No cyanosis or clubbing noted to the nails. Peripheral pulses present 2+ upper extremities and present 1+  lower extremities. Musculoskeletal: Spine ROM normal. Muscular strength intact. Neuro: Cranial nerves intact. Motor: Strength 5/5 in all extremities. Reflexes 2+ in all extremities. No focal weakness. Sensory: grossly normal to touch. Coordination intact. Pertinent Labs:  CBC:   Recent Labs     05/05/19  1423 05/06/19  0601   WBC 5.5 8.0   HGB 13.3 13.0    155     BMP:  Recent Labs     05/05/19  1423 05/06/19  0601    140   K 4.2 4.1    104   CO2 24 25   BUN 17 15   CREATININE 0.8 1.0   GLUCOSE 139* 102*   LABGLOM >60 >60     ABGs: No results found for: PH, PO2, PCO2  INR:   Recent Labs     05/05/19  1423   INR 1.1     PRO-BNP:   Lab Results   Component Value Date    PROBNP 453 (H) 05/05/2019      Cardiac Injury Profile:   Recent Labs     05/05/19  1846 05/06/19  0601   TROPONINI <0.01 <0.01      Lipid Profile:   Lab Results   Component Value Date    TRIG 72 04/05/2019    HDL 69 04/05/2019    LDLCALC 109 04/18/2018    CHOL 210 04/18/2018      Hemoglobin A1C: No components found for: HGBA1C   ECG:  See report    Radiology:  Ct Head Wo Contrast    Result Date: 5/5/2019  Clinical indications: Dizziness. Weakness. COMPARISON: None. Exposure control: This examination and all examinations utilizing ionizing radiation at this facility done so according to the ALARA (as low as reasonably achievable) principal for radiation dose reduction. Technique: Axial, sagittal and coronal computed tomography of the brain and calvarium was performed without contrast. FINDINGS: There is no evidence for acute intracranial hemorrhage, midline shift or mass effect. There is enlargement of the ventricles, sulci and cisterns bilaterally. There is patchy hypoattenuation in the periventricular deep white matter bilaterally.  There are calcifications within the carotid siphons and vertebrobasilar arterial systems. Mucosal thickening within the paranasal sinuses but no fluid levels are evident. Otherwise the brain parenchyma, CSF spaces, paranasal sinuses and mastoid air cells and surrounding soft tissue and osseous structures have a satisfactory appearance. The gray-white matter junction is otherwise preserved. The cerebellopontine angle and cisterns are unremarkable. The bony calvarium is negative for acute fracture or aggressive process. The mary ann and brainstem are unremarkable. No evidence for acute intracranial process. Cortical atrophy and chronic periventricular microangiopathy. Xr Chest Portable    Result Date: 2019  Patient MRN: [id number] : 1940 Age:  78 years Gender: Male Order Date: 2019 2:15 PM Exam: XR CHEST PORTABLE Number of Images: 1 view Indication:   near syncope, dizziness near syncope, dizziness Comparison: None. Findings: The lungs are clear. There is no evidence of pulmonary infiltrate or pleural effusion. The pulmonary vascularity is unremarkable. The cardiac, hilar and mediastinal silhouettes are satisfactory. There is uncoiling and atherosclerotic change of thoracic aorta. The bony thorax demonstrates no gross abnormality. NO ACUTE CARDIOPULMONARY PROCESS     Assessment:    Active Problems:    Near syncope  Resolved Problems:    * No resolved hospital problems. *      Plan:  Based upon Mr. Estelle Payne clinical presentation and the fact that he now has a significant increase in systolic blood pressure, will obtain a two-dimensional echocardiogram to determine the etiology of his heart murmur ventricular function and further assess and treat hypertension. Will attempt to avoid orthostatic hypotension while adjusting antihypertensive medications.     I have spent more than 45 minutes face to face with Reese Sinclair reviewing notes and laboratory data with greater than 50% of this time instructing and counseling the patient and his wife and daughter regarding my findings and recommendations and I have answered all questions as posed to me by . Rekha Ibanez wife and daughter. Thank you, Michael Zazueta MD for allowing me to consult in the care of this patient. Dorinda Shaikh DO, FACP, FACC, [facility]    NOTE:  This report was transcribed using voice recognition software. Every effort was made to ensure accuracy; however, inadvertent computerized transcription errors may be present.

## 2019-05-07 NOTE — PROGRESS NOTES
Subjective: The patient is awake and alert. No problems overnight. Denies chest pain, angina, and dyspnea. Denies abdominal pain. Tolerating diet. No nausea or vomiting. He states he is feeling a lot better. Objective:  Pt is aox3 in nad  BP (!) 159/75   Pulse 59   Temp 98.5 °F (36.9 °C) (Temporal)   Resp 18   Ht 6' 3\" (1.905 m)   Wt 205 lb 4.8 oz (93.1 kg)   SpO2 96%   BMI 25.66 kg/m²   HEENT no adenopathy no bruits  Heart:  RRR, no murmurs, gallops, or rubs.   Lungs:  CTA bilaterally, no wheeze, rales or rhonchi  Abd: bowel sounds present, nontender, nondistended, no masses  Extrem:  No clubbing, cyanosis, or edema  WBC/Hgb/Hct/Plts:  6.5/12.4/36.1/138 (05/07 8263) basic metabolic panel     Assessment:    Patient Active Problem List   Diagnosis    Hypertension    Type 2 diabetes mellitus ([address]    ED (erectile dysfunction)    Type 2 diabetes mellitus without complication ([address]    Hx of colonic polyps    Chronic gout    MGUS (monoclonal gammopathy of unknown significance)    Near syncope       Plan:  For 2-D echo and meds adjusted per Dr. Senait Albert  7:14 AM  5/7/2019

## 2019-05-08 VITALS
RESPIRATION RATE: 18 BRPM | WEIGHT: 205.3 LBS | OXYGEN SATURATION: 98 % | SYSTOLIC BLOOD PRESSURE: 124 MMHG | BODY MASS INDEX: 25.53 KG/M2 | HEIGHT: 75 IN | HEART RATE: 64 BPM | DIASTOLIC BLOOD PRESSURE: 72 MMHG | TEMPERATURE: 97.5 F

## 2019-05-08 PROBLEM — I35.0 AORTIC STENOSIS, MILD: Chronic | Status: ACTIVE | Noted: 2019-05-07

## 2019-05-08 PROBLEM — I77.810 MILD DILATION OF ASCENDING AORTA (HCC): Chronic | Status: ACTIVE | Noted: 2019-05-07

## 2019-05-08 LAB
HCT VFR BLD CALC: 36.8 % (ref 37–54)
HEMOGLOBIN: 12.8 G/DL (ref 12.5–16.5)
MCH RBC QN AUTO: 32.7 PG (ref 26–35)
MCHC RBC AUTO-ENTMCNC: 34.8 % (ref 32–34.5)
MCV RBC AUTO: 93.9 FL (ref 80–99.9)
PDW BLD-RTO: 12.6 FL (ref 11.5–15)
PLATELET # BLD: 149 E9/L (ref 130–450)
PMV BLD AUTO: 10.8 FL (ref 7–12)
RBC # BLD: 3.92 E12/L (ref 3.8–5.8)
WBC # BLD: 7.2 E9/L (ref 4.5–11.5)

## 2019-05-08 PROCEDURE — 36415 COLL VENOUS BLD VENIPUNCTURE: CPT

## 2019-05-08 PROCEDURE — 6370000000 HC RX 637 (ALT 250 FOR IP): Performed by: INTERNAL MEDICINE

## 2019-05-08 PROCEDURE — G0378 HOSPITAL OBSERVATION PER HR: HCPCS

## 2019-05-08 PROCEDURE — 85027 COMPLETE CBC AUTOMATED: CPT

## 2019-05-08 RX ORDER — NIFEDIPINE 30 MG/1
30 TABLET, FILM COATED, EXTENDED RELEASE ORAL DAILY
Qty: 30 TABLET | Refills: 3 | Status: SHIPPED | OUTPATIENT
Start: 2019-05-08 | End: 2019-05-17 | Stop reason: ALTCHOICE

## 2019-05-08 RX ADMIN — NIFEDIPINE 30 MG: 30 TABLET, FILM COATED, EXTENDED RELEASE ORAL at 08:44

## 2019-05-08 RX ADMIN — VITAMIN D, TAB 1000IU (100/BT) 1000 UNITS: 25 TAB at 08:44

## 2019-05-08 RX ADMIN — METFORMIN HYDROCHLORIDE 500 MG: 500 TABLET ORAL at 08:43

## 2019-05-08 RX ADMIN — LISINOPRIL 20 MG: 20 TABLET ORAL at 08:44

## 2019-05-08 RX ADMIN — MULTIPLE VITAMINS W/ MINERALS TAB 1 TABLET: TAB at 08:44

## 2019-05-08 ASSESSMENT — PAIN SCALES - GENERAL: PAINLEVEL_OUTOF10: 0

## 2019-05-08 NOTE — PLAN OF CARE
Problem: Falls - Risk of:  Goal: Will remain free from falls  Description  Will remain free from falls  Outcome: Met This Shift     Problem: Safety:  Goal: Free from accidental physical injury  Description  Free from accidental physical injury  Outcome: Met This Shift     Problem: Daily Care:  Goal: Daily care needs are met  Description  Daily care needs are met  Outcome: Met This Shift

## 2019-05-08 NOTE — PROGRESS NOTES
PROGRESS NOTE       PATIENT PROBLEM LIST:  Principal Problem:    Near syncope  Active Problems:    Hypertension    Type 2 diabetes mellitus (HCC)    Chronic gout    Aortic stenosis, mild    Mild dilation of ascending aorta (HCC)  Resolved Problems:    * No resolved hospital problems. *      SUBJECTIVE:  Jeannette Burkett states he feels fine this afternoon denies any shortness of breath, lightheadedness nor palpitations. He denies any chest discomfort even prior to admission. REVIEW OF SYSTEMS:  General ROS: negative for - fatigue, malaise,  weight gain or weight loss  Psychological ROS: negative for - anxiety , depression  Ophthalmic ROS: negative for - decreased vision or visual distortion. ENT ROS: negative  Allergy and Immunology ROS: negative  Hematological and Lymphatic ROS: negative  Endocrine: no heat or cold intolerance and no polyphagia, polydipsia, or polyuria  Respiratory ROS: negative for - cough, hemoptysis and shortness of breath  Cardiovascular ROS: negative for - chest discomfort, palpitations, dyspnea on exertion and loss of consciousness. Gastrointestinal ROS: no abdominal pain, change in bowel habits, or black or bloody stools  Genito-Urinary ROS: no nocturia, dysuria, trouble voiding, frequency or hematuria  Musculoskeletal ROS: negative for- myalgias, arthralgias, or claudication  Neurological ROS: no TIA or stroke symptoms otherwise no significant change in symptoms or problems since yesterday as documented in previous progress notes.     SCHEDULED MEDICATIONS:   NIFEdipine  30 mg Oral Daily    ondansetron  4 mg Intravenous Once    lisinopril  20 mg Oral Daily    cetirizine  5 mg Oral Daily    metFORMIN  500 mg Oral Daily with breakfast    therapeutic multivitamin-minerals  1 tablet Oral Daily    vitamin D  1,000 Units Oral Daily       VITAL SIGNS:                                                                                                                          /68 Pulse 63   Temp 97.8 °F (36.6 °C) (Temporal)   Resp 18   Ht 6' 3\" (1.905 m)   Wt 205 lb 4.8 oz (93.1 kg)   SpO2 96%   BMI 25.66 kg/m²   Patient Vitals for the past 96 hrs (Last 3 readings):   Weight   05/06/19 0610 205 lb 4.8 oz (93.1 kg)   05/05/19 1744 208 lb 1.6 oz (94.4 kg)   05/05/19 1351 204 lb (92.5 kg)     OBJECTIVE:    HEENT: PERRL, EOM  Intact; sclera non-icteric, conjunctiva pink. Carotids are brisk in upstroke with normal contour. No carotid bruits. Normal jugular venous pulsation at 45°. No palpable cervical nor supraclavicular nodes. Thyroid not palpable. Trachea midline. Chest: Even excursion  Lungs: CTA B, no expiratory wheezes or rhonchi, no decreased tactile fremitus without inspiratory rales. Heart: Regular  rhythm; S1 > S2, no gallop or murmur. No clicks, rub, palpable thrills   or heaves. PMI nondisplaced, 5th intercostal space MCL. Abdomen: Soft, nontender, nondistended,  mildly protuberant, no masses or organomegaly. Bowel sounds active. Extremities: Without clubbing, cyanosis or edema. Pulses present 3+ upper extermities bilaterally; present 1+ DP and present 1+ PT bilaterally.      Data:   Scheduled Meds: Reviewed  Continuous Infusions:     Intake/Output Summary (Last 24 hours) at 5/8/2019 0105  Last data filed at 5/7/2019 2230  Gross per 24 hour   Intake 480 ml   Output 1225 ml   Net -745 ml     CBC:   Recent Labs     05/05/19  1423 05/06/19  0601 05/07/19  0534   WBC 5.5 8.0 6.5   HGB 13.3 13.0 12.4*   HCT 39.0 38.9 36.1*    155 138     BMP:  Recent Labs     05/05/19  1423 05/06/19  0601    140   K 4.2 4.1    104   CO2 24 25   BUN 17 15   CREATININE 0.8 1.0   LABGLOM >60 >60     ABGs: No results found for: PH, PO2, PCO2  INR:   Recent Labs     05/05/19  1423   INR 1.1     PRO-BNP:   Lab Results   Component Value Date    PROBNP 453 (H) 05/05/2019      TSH: No results found for: TSH   Cardiac Injury Profile:   Recent Labs     05/05/19  1423 05/05/19  1849 19  0601   TROPONINI <0.01 <0.01 <0.01      Lipid Profile:   Lab Results   Component Value Date    TRIG 72 2019    HDL 69 2019    LDLCALC 109 2018    CHOL 210 2018      Hemoglobin A1C: No components found for: HGBA1C     RAD:   Ct Head Wo Contrast    Result Date: 2019  Clinical indications: Dizziness. Weakness. COMPARISON: None. Exposure control: This examination and all examinations utilizing ionizing radiation at this facility done so according to the ALARA (as low as reasonably achievable) principal for radiation dose reduction. Technique: Axial, sagittal and coronal computed tomography of the brain and calvarium was performed without contrast. FINDINGS: There is no evidence for acute intracranial hemorrhage, midline shift or mass effect. There is enlargement of the ventricles, sulci and cisterns bilaterally. There is patchy hypoattenuation in the periventricular deep white matter bilaterally. There are calcifications within the carotid siphons and vertebrobasilar arterial systems. Mucosal thickening within the paranasal sinuses but no fluid levels are evident. Otherwise the brain parenchyma, CSF spaces, paranasal sinuses and mastoid air cells and surrounding soft tissue and osseous structures have a satisfactory appearance. The gray-white matter junction is otherwise preserved. The cerebellopontine angle and cisterns are unremarkable. The bony calvarium is negative for acute fracture or aggressive process. The mary ann and brainstem are unremarkable. No evidence for acute intracranial process. Cortical atrophy and chronic periventricular microangiopathy. Xr Chest Portable    Result Date: 2019  Patient MRN: [id number] : 1940 Age:  78 years Gender: Male Order Date: 2019 2:15 PM Exam: XR CHEST PORTABLE Number of Images: 1 view Indication:   near syncope, dizziness near syncope, dizziness Comparison: None. Findings: The lungs are clear.   There is no evidence of pulmonary infiltrate or pleural effusion. The pulmonary vascularity is unremarkable. The cardiac, hilar and mediastinal silhouettes are satisfactory. There is uncoiling and atherosclerotic change of thoracic aorta. The bony thorax demonstrates no gross abnormality. NO ACUTE CARDIOPULMONARY PROCESS     Nm Cardiac Stress Test Nuclear Imaging    Result Date: 2019  Patient MRN:  [id number] : 1940 Age: 78 years Gender: Male Order Date:  2019 10:00 PM EXAM: NM MYOCARDIAL SPECT REST EXERCISE OR RX Number of Images: 8 views INDICATION:  Shortness of breath Reason for Exam?->Shortness of breath Procedure Type->Rx COMPARISON: None TECHNIQUE: 10.8 mCi of Tc-99m MIBI was injected intravenously at rest and cardiac SPECT images were performed. In addition 35 mCi of Tc-99m MIBI was injected intravenously at maximum stress by using walking Lexiscan stress. Stress SPECT images and gated study were performed. FINDINGS: Perfusion images demonstrate no reversible perfusion defect. Wall motion is within normal limits. The end diastolic volume is 802 ml. The end systolic volume is 46 ml. The estimated ejection fraction is 59 %. 1. No reversible perfusion defect 2. Ejection fraction is 59 %. 3. No significant wall motion abnormality       EKG: See Report  Echo: See Report      IMPRESSIONS:  Principal Problem:    Near syncope  Active Problems:    Hypertension    Type 2 diabetes mellitus (HCC)    Chronic gout    Aortic stenosis, mild    Mild dilation of ascending aorta (HCC)  Resolved Problems:    * No resolved hospital problems. *      RECOMMENDATIONS:  Following an in-depth discussion with Mr. David Tyson, we will need to make adjustments in his medical regimen and more importantly carefully monitor his progress regarding his initial symptom presentation as well as the status of his ascending aorta and aortic valve disease.   I have indicated to him that clearly a negative stress test does not mean that he does not have coronary artery disease considering 50% of patients with aortic stenosis have concomitant coronary artery disease. Additionally, he may require a beta blocker but he does have bradycardia at rest.  Thus he will be discharged in the morning and followed up as an outpatient both his primary physician, Dr. Dwight Albright and myself. Would also maintain his LDL cholesterol within updated 2018 ACC/AHA/AACE cholesterol guidelines. I have spent more than 77 minutes face to face with Shira Gu and reviewing notes and laboratory data, with greater than 50% of this time instructing and counseling the patient face to face regarding my findings and recommendations and I have answered all questions as posed to me by  Alex Javier. Shireen Craig, DO FACP,FACC,FSCAI      NOTE:  This report was transcribed using voice recognition software.   Every effort was made to ensure accuracy; however, inadvertent computerized transcription errors may be present

## 2019-05-08 NOTE — PROGRESS NOTES
Subjective: The patient is awake and alert. No problems overnight. Denies chest pain, angina, and dyspnea. Denies abdominal pain. Tolerating diet. No nausea or vomiting. Objective:  Pt is aox3 in nad  /68   Pulse 63   Temp 97.8 °F (36.6 °C) (Temporal)   Resp 18   Ht 6' 3\" (1.905 m)   Wt 205 lb 4.8 oz (93.1 kg)   SpO2 96%   BMI 25.66 kg/m²   HEENT no adenopathy no bruits  Heart:  RRR, no murmurs, gallops, or rubs.   Lungs:  CTA bilaterally, no wheeze, rales or rhonchi  Abd: bowel sounds present, nontender, nondistended, no masses  Extrem:  No clubbing, cyanosis, or edema  WBC/Hgb/Hct/Plts:  7.2/12.8/36.8/149 (05/08 8760) basic metabolic panel     Assessment:    Patient Active Problem List   Diagnosis    Hypertension    Type 2 diabetes mellitus ([address]    ED (erectile dysfunction)    Type 2 diabetes mellitus without complication ([address]    Hx of colonic polyps    Chronic gout    MGUS (monoclonal gammopathy of unknown significance)    Near syncope    Aortic stenosis, mild    Mild dilation of ascending aorta ([address]       Plan:    Discharge to home today      Mynor Cynthia  7:37 AM  5/8/2019

## 2019-05-31 ENCOUNTER — HOSPITAL ENCOUNTER (OUTPATIENT)
Age: 79
Discharge: HOME | End: 2019-06-02
Payer: COMMERCIAL

## 2019-05-31 PROCEDURE — 84154 ASSAY OF PSA FREE: CPT

## 2019-05-31 PROCEDURE — 84153 ASSAY OF PSA TOTAL: CPT

## 2019-06-03 LAB
PROSTATE SPECIFIC ANTIGEN FREE: 1 NG/ML
PROSTATE SPECIFIC ANTIGEN PERCENT FREE: 6 %
PROSTATE SPECIFIC ANTIGEN: 15.4 NG/ML (ref 0–4)

## 2019-08-01 ENCOUNTER — TELEPHONE (OUTPATIENT)
Dept: PHARMACY | Facility: CLINIC | Age: 79
End: 2019-08-01

## 2019-11-01 ENCOUNTER — TELEPHONE (OUTPATIENT)
Dept: PHARMACY | Facility: CLINIC | Age: 79
End: 2019-11-01

## 2020-02-12 ENCOUNTER — TELEPHONE (OUTPATIENT)
Dept: PHARMACY | Facility: CLINIC | Age: 80
End: 2020-02-12

## 2020-02-12 NOTE — TELEPHONE ENCOUNTER
CLINICAL PHARMACY: ADHERENCE REVIEW    Identified care gap per [employer], fills at [address] [facility] lisinopril 20mg daily adherence   Per [employer] report - 2018 [facility]: 39%; 2019 [facility]: 73%   Appears dose decreased from BID to daily @2017 (likely causing the low 2018 [facility] while rx updated)   As per 11/1/19 encounter, patient self-stopped lisinopril; appears lisinopril was removed from patient's medication list at 11/18/19 [facility] cardiology OV as \"discontinued by another health care provider\"     Also: metformin 500mg daily: per [employer] report - 2018 [facility]: 39%; 2019 [facility]: 86% - appears dose had been decreased from BID to daily @2017 (likely causing the low 2018 [facility] while rx updated) - 4/5/19 A1c 5.7    Reached patient; states he did restart lisinopril 20mg daily @3 months ago, on the advice of Dr. Hannha Ryan (cardiologist). Sts his BPs are \"best they've been in 40 years. ..118/70\". Also confirms taking metformin daily. (did not discuss with patient; however, with updated A1c, may give consideration to trial off metformin if continues as well controlled?)    Kassie Ruiz, PharmD, [address]  Direct: [phone]  Department, toll free: [phone], option 7     =======================================================   For Pharmacy Admin Tracking Only    PHSO: Yes  Total # of Interventions Recommended: 1  - New Order #: 0 New Medication Order Reason(s):  Adherence  - New Therapy Lab Monitoring #: 1  Recommended intervention potential cost savings: 0  Total Interventions Accepted: 0  Time Spent (min): 15

## 2020-06-24 PROBLEM — Z98.890 H/O MITRAL VALVE REPAIR: Status: ACTIVE | Noted: 2020-06-24

## 2020-06-24 PROBLEM — Z95.1 AORTOCORONARY BYPASS STATUS: Status: ACTIVE | Noted: 2020-06-24

## 2020-06-24 PROBLEM — Z95.2 H/O AORTIC VALVE REPLACEMENT: Status: ACTIVE | Noted: 2020-06-24

## 2020-06-24 PROBLEM — Z95.0 PRESENCE OF PERMANENT CARDIAC PACEMAKER: Status: ACTIVE | Noted: 2020-06-24

## 2020-06-24 PROBLEM — E78.00 PURE HYPERCHOLESTEROLEMIA: Status: ACTIVE | Noted: 2020-06-24

## 2020-07-07 ENCOUNTER — TELEPHONE (OUTPATIENT)
Dept: PHARMACY | Facility: CLINIC | Age: 80
End: 2020-07-07

## 2020-10-09 ENCOUNTER — HOSPITAL ENCOUNTER (INPATIENT)
Age: 80
LOS: 3 days | Discharge: HOME | DRG: 378 | End: 2020-10-12
Attending: EMERGENCY MEDICINE | Admitting: INTERNAL MEDICINE
Payer: MEDICARE

## 2020-10-09 PROBLEM — K62.5 RECTAL BLEED: Status: ACTIVE | Noted: 2020-10-09

## 2020-10-09 PROBLEM — K92.2 GI BLEED: Status: ACTIVE | Noted: 2020-10-09

## 2020-10-09 LAB
ALBUMIN SERPL-MCNC: 4.6 G/DL (ref 3.5–5.2)
ALP BLD-CCNC: 138 U/L (ref 40–129)
ALT SERPL-CCNC: 25 U/L (ref 0–40)
ANION GAP SERPL CALCULATED.3IONS-SCNC: 10 MMOL/L (ref 7–16)
AST SERPL-CCNC: 22 U/L (ref 0–39)
BASOPHILS ABSOLUTE: 0.05 E9/L (ref 0–0.2)
BASOPHILS RELATIVE PERCENT: 0.7 % (ref 0–2)
BILIRUB SERPL-MCNC: 0.4 MG/DL (ref 0–1.2)
BUN BLDV-MCNC: 24 MG/DL (ref 8–23)
CALCIUM SERPL-MCNC: 10.1 MG/DL (ref 8.6–10.2)
CHLORIDE BLD-SCNC: 105 MMOL/L (ref 98–107)
CO2: 24 MMOL/L (ref 22–29)
CREAT SERPL-MCNC: 1 MG/DL (ref 0.7–1.2)
EOSINOPHILS ABSOLUTE: 0.07 E9/L (ref 0.05–0.5)
EOSINOPHILS RELATIVE PERCENT: 1 % (ref 0–6)
GFR AFRICAN AMERICAN: >60
GFR NON-AFRICAN AMERICAN: >60 ML/MIN/1.73
GLUCOSE BLD-MCNC: 126 MG/DL (ref 74–99)
HCT VFR BLD CALC: 37.2 % (ref 37–54)
HEMOGLOBIN: 11.5 G/DL (ref 12.5–16.5)
IMMATURE GRANULOCYTES #: 0.01 E9/L
IMMATURE GRANULOCYTES %: 0.1 % (ref 0–5)
INR BLD: 1.5
LYMPHOCYTES ABSOLUTE: 1.61 E9/L (ref 1.5–4)
LYMPHOCYTES RELATIVE PERCENT: 23.6 % (ref 20–42)
MCH RBC QN AUTO: 29.6 PG (ref 26–35)
MCHC RBC AUTO-ENTMCNC: 30.9 % (ref 32–34.5)
MCV RBC AUTO: 95.6 FL (ref 80–99.9)
MONOCYTES ABSOLUTE: 0.64 E9/L (ref 0.1–0.95)
MONOCYTES RELATIVE PERCENT: 9.4 % (ref 2–12)
NEUTROPHILS ABSOLUTE: 4.45 E9/L (ref 1.8–7.3)
NEUTROPHILS RELATIVE PERCENT: 65.2 % (ref 43–80)
PDW BLD-RTO: 17.4 FL (ref 11.5–15)
PLATELET # BLD: 120 E9/L (ref 130–450)
PMV BLD AUTO: 11.5 FL (ref 7–12)
POTASSIUM REFLEX MAGNESIUM: 4.5 MMOL/L (ref 3.5–5)
PROTHROMBIN TIME: 18.2 SEC (ref 9.3–12.4)
RBC # BLD: 3.89 E12/L (ref 3.8–5.8)
SODIUM BLD-SCNC: 139 MMOL/L (ref 132–146)
TOTAL PROTEIN: 7.9 G/DL (ref 6.4–8.3)
WBC # BLD: 6.8 E9/L (ref 4.5–11.5)

## 2020-10-09 PROCEDURE — 2580000003 HC RX 258: Performed by: INTERNAL MEDICINE

## 2020-10-09 PROCEDURE — 2060000000 HC ICU INTERMEDIATE R&B

## 2020-10-09 PROCEDURE — 93005 ELECTROCARDIOGRAM TRACING: CPT | Performed by: EMERGENCY MEDICINE

## 2020-10-09 PROCEDURE — 99283 EMERGENCY DEPT VISIT LOW MDM: CPT

## 2020-10-09 PROCEDURE — 80053 COMPREHEN METABOLIC PANEL: CPT

## 2020-10-09 PROCEDURE — G0378 HOSPITAL OBSERVATION PER HR: HCPCS

## 2020-10-09 PROCEDURE — 2580000003 HC RX 258: Performed by: EMERGENCY MEDICINE

## 2020-10-09 PROCEDURE — 85610 PROTHROMBIN TIME: CPT

## 2020-10-09 PROCEDURE — 85025 COMPLETE CBC W/AUTO DIFF WBC: CPT

## 2020-10-09 RX ORDER — ACETAMINOPHEN 650 MG/1
650 SUPPOSITORY RECTAL EVERY 6 HOURS PRN
Status: DISCONTINUED | OUTPATIENT
Start: 2020-10-09 | End: 2020-10-12 | Stop reason: HOSPADM

## 2020-10-09 RX ORDER — SODIUM CHLORIDE 0.9 % (FLUSH) 0.9 %
10 SYRINGE (ML) INJECTION EVERY 12 HOURS SCHEDULED
Status: DISCONTINUED | OUTPATIENT
Start: 2020-10-10 | End: 2020-10-12 | Stop reason: HOSPADM

## 2020-10-09 RX ORDER — AMLODIPINE BESYLATE 5 MG/1
5 TABLET ORAL DAILY
Status: ON HOLD | COMMUNITY
End: 2020-10-12 | Stop reason: HOSPADM

## 2020-10-09 RX ORDER — SODIUM CHLORIDE 0.9 % (FLUSH) 0.9 %
10 SYRINGE (ML) INJECTION PRN
Status: DISCONTINUED | OUTPATIENT
Start: 2020-10-09 | End: 2020-10-12 | Stop reason: HOSPADM

## 2020-10-09 RX ORDER — HYDRALAZINE HYDROCHLORIDE 20 MG/ML
10 INJECTION INTRAMUSCULAR; INTRAVENOUS EVERY 4 HOURS PRN
Status: DISCONTINUED | OUTPATIENT
Start: 2020-10-09 | End: 2020-10-12 | Stop reason: HOSPADM

## 2020-10-09 RX ORDER — ACETAMINOPHEN 325 MG/1
650 TABLET ORAL EVERY 6 HOURS PRN
Status: DISCONTINUED | OUTPATIENT
Start: 2020-10-09 | End: 2020-10-12 | Stop reason: HOSPADM

## 2020-10-09 RX ORDER — SODIUM CHLORIDE 9 MG/ML
INJECTION, SOLUTION INTRAVENOUS CONTINUOUS
Status: DISCONTINUED | OUTPATIENT
Start: 2020-10-10 | End: 2020-10-11

## 2020-10-09 RX ORDER — POLYETHYLENE GLYCOL 3350 17 G/17G
17 POWDER, FOR SOLUTION ORAL DAILY PRN
Status: DISCONTINUED | OUTPATIENT
Start: 2020-10-09 | End: 2020-10-12 | Stop reason: HOSPADM

## 2020-10-09 RX ORDER — 0.9 % SODIUM CHLORIDE 0.9 %
1000 INTRAVENOUS SOLUTION INTRAVENOUS ONCE
Status: COMPLETED | OUTPATIENT
Start: 2020-10-09 | End: 2020-10-09

## 2020-10-09 RX ORDER — ONDANSETRON 2 MG/ML
4 INJECTION INTRAMUSCULAR; INTRAVENOUS EVERY 6 HOURS PRN
Status: DISCONTINUED | OUTPATIENT
Start: 2020-10-09 | End: 2020-10-12 | Stop reason: HOSPADM

## 2020-10-09 RX ORDER — PROMETHAZINE HYDROCHLORIDE 25 MG/1
12.5 TABLET ORAL EVERY 6 HOURS PRN
Status: DISCONTINUED | OUTPATIENT
Start: 2020-10-09 | End: 2020-10-12 | Stop reason: HOSPADM

## 2020-10-09 RX ADMIN — SODIUM CHLORIDE: 9 INJECTION, SOLUTION INTRAVENOUS at 23:36

## 2020-10-09 RX ADMIN — SODIUM CHLORIDE 1000 ML: 9 INJECTION, SOLUTION INTRAVENOUS at 22:00

## 2020-10-09 ASSESSMENT — PAIN SCALES - GENERAL: PAINLEVEL_OUTOF10: 0

## 2020-10-10 LAB
ANION GAP SERPL CALCULATED.3IONS-SCNC: 9 MMOL/L (ref 7–16)
BASOPHILS ABSOLUTE: 0.04 E9/L (ref 0–0.2)
BASOPHILS RELATIVE PERCENT: 0.8 % (ref 0–2)
BUN BLDV-MCNC: 19 MG/DL (ref 8–23)
CALCIUM SERPL-MCNC: 9.4 MG/DL (ref 8.6–10.2)
CHLORIDE BLD-SCNC: 111 MMOL/L (ref 98–107)
CO2: 23 MMOL/L (ref 22–29)
CREAT SERPL-MCNC: 0.9 MG/DL (ref 0.7–1.2)
EKG ATRIAL RATE: 77 BPM
EKG P AXIS: 92 DEGREES
EKG Q-T INTERVAL: 430 MS
EKG QRS DURATION: 164 MS
EKG QTC CALCULATION (BAZETT): 480 MS
EKG R AXIS: -80 DEGREES
EKG T AXIS: 96 DEGREES
EKG VENTRICULAR RATE: 75 BPM
EOSINOPHILS ABSOLUTE: 0.08 E9/L (ref 0.05–0.5)
EOSINOPHILS RELATIVE PERCENT: 1.7 % (ref 0–6)
GFR AFRICAN AMERICAN: >60
GFR NON-AFRICAN AMERICAN: >60 ML/MIN/1.73
GLUCOSE BLD-MCNC: 102 MG/DL (ref 74–99)
HCT VFR BLD CALC: 32.8 % (ref 37–54)
HCT VFR BLD CALC: 34 % (ref 37–54)
HCT VFR BLD CALC: 34.2 % (ref 37–54)
HCT VFR BLD CALC: 34.3 % (ref 37–54)
HCT VFR BLD CALC: 35.6 % (ref 37–54)
HEMOGLOBIN: 10.6 G/DL (ref 12.5–16.5)
HEMOGLOBIN: 11 G/DL (ref 12.5–16.5)
HEMOGLOBIN: 11.1 G/DL (ref 12.5–16.5)
HEMOGLOBIN: 11.2 G/DL (ref 12.5–16.5)
HEMOGLOBIN: 11.4 G/DL (ref 12.5–16.5)
IMMATURE GRANULOCYTES #: 0.01 E9/L
IMMATURE GRANULOCYTES %: 0.2 % (ref 0–5)
LYMPHOCYTES ABSOLUTE: 1.09 E9/L (ref 1.5–4)
LYMPHOCYTES RELATIVE PERCENT: 22.8 % (ref 20–42)
MCH RBC QN AUTO: 30.2 PG (ref 26–35)
MCHC RBC AUTO-ENTMCNC: 32.4 % (ref 32–34.5)
MCV RBC AUTO: 93.5 FL (ref 80–99.9)
METER GLUCOSE: 89 MG/DL (ref 74–99)
METER GLUCOSE: 96 MG/DL (ref 74–99)
MONOCYTES ABSOLUTE: 0.43 E9/L (ref 0.1–0.95)
MONOCYTES RELATIVE PERCENT: 9 % (ref 2–12)
NEUTROPHILS ABSOLUTE: 3.13 E9/L (ref 1.8–7.3)
NEUTROPHILS RELATIVE PERCENT: 65.5 % (ref 43–80)
PDW BLD-RTO: 17.2 FL (ref 11.5–15)
PLATELET # BLD: 107 E9/L (ref 130–450)
PMV BLD AUTO: 10.8 FL (ref 7–12)
POTASSIUM REFLEX MAGNESIUM: 4.7 MMOL/L (ref 3.5–5)
RBC # BLD: 3.67 E12/L (ref 3.8–5.8)
SODIUM BLD-SCNC: 143 MMOL/L (ref 132–146)
WBC # BLD: 4.8 E9/L (ref 4.5–11.5)

## 2020-10-10 PROCEDURE — 85025 COMPLETE CBC W/AUTO DIFF WBC: CPT

## 2020-10-10 PROCEDURE — 2580000003 HC RX 258: Performed by: SURGERY

## 2020-10-10 PROCEDURE — 36415 COLL VENOUS BLD VENIPUNCTURE: CPT

## 2020-10-10 PROCEDURE — 96374 THER/PROPH/DIAG INJ IV PUSH: CPT

## 2020-10-10 PROCEDURE — 85018 HEMOGLOBIN: CPT

## 2020-10-10 PROCEDURE — 80048 BASIC METABOLIC PNL TOTAL CA: CPT

## 2020-10-10 PROCEDURE — 6370000000 HC RX 637 (ALT 250 FOR IP): Performed by: INTERNAL MEDICINE

## 2020-10-10 PROCEDURE — 6360000002 HC RX W HCPCS: Performed by: INTERNAL MEDICINE

## 2020-10-10 PROCEDURE — 85014 HEMATOCRIT: CPT

## 2020-10-10 PROCEDURE — 2580000003 HC RX 258: Performed by: INTERNAL MEDICINE

## 2020-10-10 PROCEDURE — G0378 HOSPITAL OBSERVATION PER HR: HCPCS

## 2020-10-10 PROCEDURE — 82962 GLUCOSE BLOOD TEST: CPT

## 2020-10-10 PROCEDURE — 2060000000 HC ICU INTERMEDIATE R&B

## 2020-10-10 PROCEDURE — 96376 TX/PRO/DX INJ SAME DRUG ADON: CPT

## 2020-10-10 RX ORDER — ATORVASTATIN CALCIUM 40 MG/1
40 TABLET, FILM COATED ORAL DAILY
Status: DISCONTINUED | OUTPATIENT
Start: 2020-10-10 | End: 2020-10-12 | Stop reason: HOSPADM

## 2020-10-10 RX ORDER — DEXTROSE MONOHYDRATE 25 G/50ML
12.5 INJECTION, SOLUTION INTRAVENOUS PRN
Status: DISCONTINUED | OUTPATIENT
Start: 2020-10-10 | End: 2020-10-12 | Stop reason: HOSPADM

## 2020-10-10 RX ORDER — VITAMIN B COMPLEX
1000 TABLET ORAL DAILY
Status: DISCONTINUED | OUTPATIENT
Start: 2020-10-10 | End: 2020-10-12 | Stop reason: HOSPADM

## 2020-10-10 RX ORDER — ACETAMINOPHEN 500 MG
500 TABLET ORAL EVERY 6 HOURS PRN
Status: DISCONTINUED | OUTPATIENT
Start: 2020-10-10 | End: 2020-10-10 | Stop reason: DRUGHIGH

## 2020-10-10 RX ORDER — LISINOPRIL 10 MG/1
10 TABLET ORAL DAILY
Status: DISCONTINUED | OUTPATIENT
Start: 2020-10-10 | End: 2020-10-12 | Stop reason: HOSPADM

## 2020-10-10 RX ORDER — NICOTINE POLACRILEX 4 MG
15 LOZENGE BUCCAL PRN
Status: DISCONTINUED | OUTPATIENT
Start: 2020-10-10 | End: 2020-10-12 | Stop reason: HOSPADM

## 2020-10-10 RX ORDER — M-VIT,TX,IRON,MINS/CALC/FOLIC 27MG-0.4MG
1 TABLET ORAL DAILY
Status: DISCONTINUED | OUTPATIENT
Start: 2020-10-10 | End: 2020-10-12 | Stop reason: HOSPADM

## 2020-10-10 RX ORDER — AMLODIPINE BESYLATE 5 MG/1
5 TABLET ORAL DAILY
Status: DISCONTINUED | OUTPATIENT
Start: 2020-10-10 | End: 2020-10-11

## 2020-10-10 RX ORDER — DEXTROSE MONOHYDRATE 50 MG/ML
100 INJECTION, SOLUTION INTRAVENOUS PRN
Status: DISCONTINUED | OUTPATIENT
Start: 2020-10-10 | End: 2020-10-12 | Stop reason: HOSPADM

## 2020-10-10 RX ADMIN — SODIUM CHLORIDE: 9 INJECTION, SOLUTION INTRAVENOUS at 08:06

## 2020-10-10 RX ADMIN — ATORVASTATIN CALCIUM 40 MG: 40 TABLET, FILM COATED ORAL at 15:06

## 2020-10-10 RX ADMIN — Medication 1000 UNITS: at 15:06

## 2020-10-10 RX ADMIN — HYDRALAZINE HYDROCHLORIDE 10 MG: 20 INJECTION, SOLUTION INTRAMUSCULAR; INTRAVENOUS at 00:28

## 2020-10-10 RX ADMIN — METOPROLOL TARTRATE 12.5 MG: 25 TABLET, FILM COATED ORAL at 20:45

## 2020-10-10 RX ADMIN — MULTIPLE VITAMINS W/ MINERALS TAB 1 TABLET: TAB at 15:06

## 2020-10-10 RX ADMIN — AMLODIPINE BESYLATE 5 MG: 5 TABLET ORAL at 15:06

## 2020-10-10 RX ADMIN — HYDRALAZINE HYDROCHLORIDE 10 MG: 20 INJECTION, SOLUTION INTRAMUSCULAR; INTRAVENOUS at 10:33

## 2020-10-10 RX ADMIN — SODIUM CHLORIDE: 9 INJECTION, SOLUTION INTRAVENOUS at 17:41

## 2020-10-10 RX ADMIN — LISINOPRIL 10 MG: 10 TABLET ORAL at 15:06

## 2020-10-10 ASSESSMENT — PAIN SCALES - GENERAL
PAINLEVEL_OUTOF10: 0

## 2020-10-10 NOTE — ED PROVIDER NOTES
HPI:  10/9/20,   Time: 9:47 PM EDT         Car Yang is a 78 y.o. male presenting to the ED for bright red rectal bleeding, beginning 1 day ago. The complaint has been intermittent x6 episodes, moderate in severity, and worsened by nothing. No alleviating factors. No prior similar episodes patient is on Eliquis which he stopped this evening. No dizziness or chest pain or shortness of breath no abdominal pain or vomiting or diarrhea    ROS:   Pertinent positives and negatives are stated within HPI, all other systems reviewed and are negative.  --------------------------------------------- PAST HISTORY ---------------------------------------------  Past Medical History:  has a past medical history of Diabetes mellitus ([facility] [address] Erectile dysfunction, Gout, Hypertension, and Vitamin D deficiency. Past Surgical History:  has a past surgical history that includes Appendectomy (8776); Tonsillectomy; Cataract removal with implant (Left, 6/6/13); hernia repair (1970); and Cataract removal with implant (10/31/13). Social History:  reports that he has quit smoking. He quit after 15.00 years of use. He quit smokeless tobacco use about 49 years ago. He reports current alcohol use. He reports that he does not use drugs. Family History: family history is not on file. The patients home medications have been reviewed. Allergies: Patient has no known allergies.     -------------------------------------------------- RESULTS -------------------------------------------------  All laboratory and radiology results have been personally reviewed by myself   LABS:  Results for orders placed or performed during the hospital encounter of 10/09/20   Protime-INR   Result Value Ref Range    Protime 18.2 (H) 9.3 - 12.4 sec    INR 1.5    CBC auto differential   Result Value Ref Range    WBC 6.8 4.5 - 11.5 E9/L    RBC 3.89 3.80 - 5.80 E12/L    Hemoglobin 11.5 (L) 12.5 - 16.5 g/dL    Hematocrit 37.2 37.0 - 54.0 %    MCV 95.6 80.0 - 99.9 fL    MCH 29.6 26.0 - 35.0 pg    MCHC 30.9 (L) 32.0 - 34.5 %    RDW 17.4 (H) 11.5 - 15.0 fL    Platelets 678 (L) 365 - 450 E9/L    MPV 11.5 7.0 - 12.0 fL    Neutrophils % 65.2 43.0 - 80.0 %    Immature Granulocytes % 0.1 0.0 - 5.0 %    Lymphocytes % 23.6 20.0 - 42.0 %    Monocytes % 9.4 2.0 - 12.0 %    Eosinophils % 1.0 0.0 - 6.0 %    Basophils % 0.7 0.0 - 2.0 %    Neutrophils Absolute 4.45 1.80 - 7.30 E9/L    Immature Granulocytes # 0.01 E9/L    Lymphocytes Absolute 1.61 1.50 - 4.00 E9/L    Monocytes Absolute 0.64 0.10 - 0.95 E9/L    Eosinophils Absolute 0.07 0.05 - 0.50 E9/L    Basophils Absolute 0.05 0.00 - 0.20 E9/L   Comprehensive Metabolic Panel w/ Reflex to MG   Result Value Ref Range    Sodium 139 132 - 146 mmol/L    Potassium reflex Magnesium 4.5 3.5 - 5.0 mmol/L    Chloride 105 98 - 107 mmol/L    CO2 24 22 - 29 mmol/L    Anion Gap 10 7 - 16 mmol/L    Glucose 126 (H) 74 - 99 mg/dL    BUN 24 (H) 8 - 23 mg/dL    CREATININE 1.0 0.7 - 1.2 mg/dL    GFR Non-African American >60 >=60 mL/min/1.73    GFR African American >60     Calcium 10.1 8.6 - 10.2 mg/dL    Total Protein 7.9 6.4 - 8.3 g/dL    Alb 4.6 3.5 - 5.2 g/dL    Total Bilirubin 0.4 0.0 - 1.2 mg/dL    Alkaline Phosphatase 138 (H) 40 - 129 U/L    ALT 25 0 - 40 U/L    AST 22 0 - 39 U/L   EKG 12 Lead   Result Value Ref Range    Ventricular Rate 75 BPM    Atrial Rate 77 BPM    QRS Duration 164 ms    Q-T Interval 430 ms    QTc Calculation (Bazett) 480 ms    P Axis 92 degrees    R Axis -80 degrees    T Axis 96 degrees       RADIOLOGY:  Interpreted by Radiologist.  No orders to display       ------------------------- NURSING NOTES AND VITALS REVIEWED ---------------------------   The nursing notes within the ED encounter and vital signs as below have been reviewed.    BP (!) 181/75   Pulse 82   Temp 97.8 °F (36.6 °C) (Temporal)   Resp 14   Ht 6' 3\" (1.905 m)   Wt 200 lb (90.7 kg)   SpO2 97%   BMI 25.00 kg/m²   Oxygen Saturation Interpretation: Normal      ---------------------------------------------------PHYSICAL EXAM--------------------------------------      Constitutional/General: Alert and oriented x3, well appearing, non toxic in NAD  Head: NC/AT  Eyes: PERRL, EOMI  Mouth: Oropharynx clear, handling secretions, no trismus  Neck: Supple, full ROM, no meningeal signs  Pulmonary: Lungs clear to auscultation bilaterally, no wheezes, rales, or rhonchi. Not in respiratory distress  Cardiovascular:  Regular rate and rhythm, no murmurs, gallops, or rubs. 2+ distal pulses  Abdomen: Soft, non tender, non distended,   Extremities: Moves all extremities x 4. Warm and well perfused  Skin: warm and dry without rash  Neurologic: GCS 15,  Psych: Normal Affect      ------------------------------ ED COURSE/MEDICAL DECISION MAKING----------------------  Medications   0.9 % sodium chloride bolus (1,000 mLs Intravenous New Bag 10/9/20 2200)         Medical Decision Making:    Rectal bleeding on patient on Eliquis vital stable and hemoglobin normal    Counseling: The emergency provider has spoken with the patient and discussed todays results, in addition to providing specific details for the plan of care and counseling regarding the diagnosis and prognosis. Questions are answered at this time and they are agreeable with the plan.      --------------------------------- IMPRESSION AND DISPOSITION ---------------------------------    IMPRESSION  1.  Rectal bleeding New Problem       DISPOSITION  Disposition: Admit to med/surg floor with telemetry  Patient condition is stable                  Lawerence Bumpers, MD  10/09/20 2624

## 2020-10-10 NOTE — CONSULTS
Surgery Consult    Chief Complaint: Rectal bleeding    HPI  78 y.o. male who takes anticoagulants for atrial fibrillation. Presents to the hospital with 2 days of rectal bleeding. Started all of a sudden, passing red blood and clots. No abdominal pain or rectal pain. No previous episodes similar to this. History of multiple colonoscopies at the [facility] clinic. All of which have had polyps removed. Reports reviewed in care everywhere. Last colonoscopy 2018. Past Medical History:   Diagnosis Date    Diabetes mellitus ([address]     states glucose is running normal    Erectile dysfunction     Gout     Hypertension     states doing well    Vitamin D deficiency        Past Surgical History:   Procedure Laterality Date    APPENDECTOMY  5    CATARACT REMOVAL WITH IMPLANT Left 6/6/13    CATARACT REMOVAL WITH IMPLANT  10/31/13    [facility]ERNIA REPAIR  1970    [facility]    TONSILLECTOMY         Medications Prior to Admission:    Prior to Admission medications    Medication Sig Start Date End Date Taking?  Authorizing Provider   amLODIPine (NORVASC) 5 MG tablet Take 5 mg by mouth daily   Yes Historical Provider, MD   acetaminophen (TYLENOL) 500 MG tablet Take 500 mg by mouth every 6 hours as needed for Pain   Yes Historical Provider, MD   aspirin 81 MG chewable tablet Take 81 mg by mouth daily   Yes Historical Provider, MD   apixaban (ELIQUIS) 5 MG TABS tablet Take 5 mg by mouth 2 times daily   Yes Historical Provider, MD   atorvastatin (LIPITOR) 40 MG tablet Take 40 mg by mouth daily   Yes Historical Provider, MD   metoprolol tartrate (LOPRESSOR) 25 MG tablet Take by mouth 2 times daily Take one half tablet twice daily   Yes Historical Provider, MD   lisinopril (PRINIVIL;ZESTRIL) 20 MG tablet Take 1 tablet by mouth daily Indications: AM Instructed to take with sip water am of procedure  Patient taking differently: Take 10 mg by mouth daily Indications: AM Dose reduced after discharge from [facility] on 6/17/2020. 3/16/20  Yes Jadiel Lobo MD   metFORMIN (GLUCOPHAGE) 500 MG tablet Take 1 tablet by mouth daily (with breakfast) 3/16/20  Yes Suzy Espinoza MD   vitamin D (CHOLECALCIFEROL) 1000 UNIT TABS tablet Take 1,000 Units by mouth daily   Yes Historical Provider, MD   Multiple Vitamins-Minerals (THERAPEUTIC MULTIVITAMIN-MINERALS) tablet Take 1 tablet by mouth daily   Yes Historical Provider, MD       No Known Allergies    History reviewed. No pertinent family history. Social History     Tobacco Use    Smoking status: Former Smoker     Years: 15.00    Smokeless tobacco: Former User     Quit date: 10/28/1970   Substance Use Topics    Alcohol use: Yes     Comment: 1 drink daily    Drug use: No         Review of Systems   General ROS: negative  Hematological and Lymphatic ROS: negative  Respiratory ROS: negative  Cardiovascular ROS: negative  Gastrointestinal ROS: negative  Genito-Urinary ROS: negative  Musculoskeletal ROS: negative      PHYSICAL EXAM:    Vitals:    10/10/20 0810   BP: (!) 179/79   Pulse: 96   Resp: 16   Temp: 98.8 °F (37.1 °C)   SpO2: 98%       General Appearance:  awake, alert, oriented, in no acute distress  Skin:  Skin color, texture, turgor normal. No rashes or lesions. Head/face:  NCAT  Eyes:  No gross abnormalities. Lungs:  Normal expansion. Clear to auscultation. No rales, rhonchi, or wheezing. Heart:  Heart regular rate and rhythm  Abdomen:  Soft, non-tender, normal bowel sounds. No bruits, organomegaly or masses. Extremities: Extremities warm to touch, pink, with no edema.       LABS:    CBC  Recent Labs     10/10/20  0516 10/10/20  1115   WBC 4.8  --    HGB 11.1*  11.0* 11.2*   HCT 34.3*  34.0* 34.2*   *  --      BMP  Recent Labs     10/10/20  0516      K 4.7   *   CO2 23   BUN 19   CREATININE 0.9   CALCIUM 9.4     Liver Function  Recent Labs     10/09/20  2100   BILITOT 0.4   AST 22   ALT 25   ALKPHOS 138*   PROT 7.9   LABALBU 4.6     No results for input(s): LACTATE in the last 72 hours. Recent Labs     10/09/20  2100   INR 1.5       RADIOLOGY    No results found.       ASSESSMENT:  78 y.o. male with rectal bleeding, pattern suspicious for diverticular disease although no mention of diverticular disease on colonoscopy reports reviewed from [facility]    PLAN:  Hold anticoagulants and antiplatelet agents  Monitor hemoglobin  Okay for clear liquids  Decrease IV fluid rate    Electronically signed by Ariana Desir MD on 10/10/20 at 1:40 PM EDT

## 2020-10-10 NOTE — H&P
Department of Internal Medicine  Dr. Phuc Storm History and Physical      CHIEF COMPLAINT:  Rectal bleeding  Reason for Admission:  Rectal bleeding    HISTORY OF PRESENT ILLNESS:      The patient is a 78 y.o. male with  who presents with the above problems. He has been on eliquis for atrial fibrillation. Back in June he had ACB and aortic root replacement/replacement for aneurysm at the [facility] clinic. He also had pacemaker placement.significantly, he has history of multiple colon polyps and has had multiple colonoscopies in the past. At the [facility] clinic, these were deemed precancerous. Yesterday he was noted to have rectal bleeding. He had already taken his morning dose of Eliquis. He has not had any further bleeding since without his anticoagulants. He denies any chest pain, chest pressure. No nausea or vomiting. He feels good now. Past Medical History:        Diagnosis Date    Diabetes mellitus ([facility] [address]     states glucose is running normal    Erectile dysfunction     Gout     Hypertension     states doing well    Vitamin D deficiency    Chronological List of Surgeries and Major Events:- from his June hospitalization  6/8/2020: AVR (#25 Perimount), MV Repair, CABG x 2 (Vein graft end to side posterior descending (PDA), Vein graft end to side mid LAD), Non Coronary Sinus repair, Ascending Replacement - Coagulopathy requiring PRBC /FFP/Cryo/Plts. Post op CHB with escape in 20-30s requiring VVI pacing  6/10/2020: 13 second pause  6/11/2020: Paroxysmal atrial fibrillation  6/13/2020: Readmit to CVICU for recurrent symptomatic pauses with poor pacer capture  6/15/2020: PPM placement    A/P of Major Active Problems:   Card - Immediate post op CHB with escape in 20-30s on arrival to ICU. Then converted to SR with 1st degree AVB. Had 13 sec pause/asystole.  Also developed bradycardia 50s with heart block - Started VVI pacing at 80 for HD support with 2nd set V wires due to better capture (VVI 40, mA 5 (loses capture at 1), Sensitivity 2.0. EPS consulted - recs reviewed with Dr. Horacio Magdaleno - jes on systemic anticoagulation, avoid michelle blocking agents. Renal - Post op KELLI Cr improved. Diuresing with intermittent lasix doses. Past Surgical History:        Procedure Laterality Date    APPENDECTOMY  5    CATARACT REMOVAL WITH IMPLANT Left 6/6/13    CATARACT REMOVAL WITH IMPLANT  10/31/13    [facility]IR  1970    [facility]    TONSILLECTOMY     multiple colonoscopies at [facility]    Medications Prior to Admission:    Medications Prior to Admission: amLODIPine (NORVASC) 5 MG tablet, Take 5 mg by mouth daily  acetaminophen (TYLENOL) 500 MG tablet, Take 500 mg by mouth every 6 hours as needed for Pain  aspirin 81 MG chewable tablet, Take 81 mg by mouth daily  apixaban (ELIQUIS) 5 MG TABS tablet, Take 5 mg by mouth 2 times daily  atorvastatin (LIPITOR) 40 MG tablet, Take 40 mg by mouth daily  metoprolol tartrate (LOPRESSOR) 25 MG tablet, Take by mouth 2 times daily Take one half tablet twice daily  lisinopril (PRINIVIL;ZESTRIL) 20 MG tablet, Take 1 tablet by mouth daily Indications: AM Instructed to take with sip water am of procedure (Patient taking differently: Take 10 mg by mouth daily Indications: AM Dose reduced after discharge from [facility] on 6/17/2020.)  metFORMIN (GLUCOPHAGE) 500 MG tablet, Take 1 tablet by mouth daily (with breakfast)  vitamin D (CHOLECALCIFEROL) 1000 UNIT TABS tablet, Take 1,000 Units by mouth daily  Multiple Vitamins-Minerals (THERAPEUTIC MULTIVITAMIN-MINERALS) tablet, Take 1 tablet by mouth daily    Allergies:  Patient has no known allergies. Social History:   TOBACCO:   reports that he has quit smoking. He quit after 15.00 years of use. He quit smokeless tobacco use about 49 years ago. Family History:   History reviewed. No pertinent family history.   REVIEW OF SYSTEMS:  CONSTITUTIONAL:  negative  EYES:  negative  HEENT:  negative  RESPIRATORY:  negative  CARDIOVASCULAR: negative  GASTROINTESTINAL:  positive for hemtochezia  GENITOURINARY:  negative  ALLERGIC/IMMUNOLOGIC:  negative  ENDOCRINE:  negative  MUSCULOSKELETAL:  negative  NEUROLOGICAL:  negative  PHYSICAL EXAM:    Vitals:  BP (!) 179/79   Pulse 96   Temp 98.8 °F (37.1 °C) (Oral)   Resp 16   Ht 6' 3\" (1.905 m)   Wt 200 lb (90.7 kg)   SpO2 98%   BMI 25.00 kg/m²     CONSTITUTIONAL:  awake, alert, cooperative, no apparent distress, and appears stated age  EYES:  Lids and lashes normal, pupils equal, round and reactive to light, extra ocular muscles intact, sclera clear, conjunctiva normal  ENT:  Normocephalic, without obvious abnormality, atraumatic, sinuses nontender on palpation, external ears without lesions, oral pharynx with moist mucus membranes, tonsils without erythema or exudates, gums normal and good dentition. NECK:  Supple, symmetrical, trachea midline, no adenopathy, thyroid symmetric, not enlarged and no tenderness, skin normal  HEMATOLOGIC/LYMPHATICS:  no cervical lymphadenopathy  BACK:  Symmetric, no curvature, spinous processes are non-tender on palpation, paraspinous muscles are non-tender on palpation, no costal vertebral tenderness  LUNGS:  No increased work of breathing, good air exchange, clear to auscultation bilaterally, no crackles or wheezing  CARDIOVASCULAR:  Normal apical impulse, regular rate and rhythm, normal S1 and S2, no S3 or S4, and no murmur noted  ABDOMEN:  Soft, nontender. Normal bs    MUSCULOSKELETAL:  There is no redness, warmth, or swelling of the joints. Full range of motion noted. Motor strength is 5 out of 5 all extremities bilaterally. Tone is normal.  NEUROLOGIC:  Awake, alert, oriented to name, place and time. Cranial nerves II-XII are grossly intact. Motor is 5 out of 5 bilaterally. Cerebellar finger to nose, heel to shin intact. Sensory is intact.   Babinski down going, Romberg negative, and gait is normal.  SKIN:  no bruising or bleeding    DATA:  CBC:   Lab Results   Component Value Date    WBC 4.8 10/10/2020    RBC 3.67 10/10/2020    HGB 11.1 10/10/2020    HGB 11.0 10/10/2020    HCT 34.3 10/10/2020    HCT 34.0 10/10/2020    MCV 93.5 10/10/2020    MCH 30.2 10/10/2020    MCHC 32.4 10/10/2020    RDW 17.2 10/10/2020     10/10/2020    MPV 10.8 10/10/2020     CMP:    Lab Results   Component Value Date     10/10/2020    K 4.7 10/10/2020     10/10/2020    CO2 23 10/10/2020    BUN 19 10/10/2020    CREATININE 0.9 10/10/2020    GFRAA >60 10/10/2020    LABGLOM >60 10/10/2020    GLUCOSE 102 10/10/2020    PROT 7.9 10/09/2020    LABALBU 4.6 10/09/2020    CALCIUM 9.4 10/10/2020    BILITOT 0.4 10/09/2020    ALKPHOS 138 10/09/2020    AST 22 10/09/2020    ALT 25 10/09/2020     LDH:  No results found for: LDH  Warfarin PT/INR:  No components found for: Katherin Bosworth  Last 3 Troponin:    Lab Results   Component Value Date    TROPONINI <0.01 05/06/2019    TROPONINI <0.01 05/05/2019    TROPONINI <0.01 05/05/2019     ABG:  No results found for: PH, PCO2, PO2, HCO3, BE, THGB, TCO2, O2SAT  HgBA1c:    Lab Results   Component Value Date    LABA1C 5.8 03/16/2020     TSH:  No results found for: TSH  VITAMIN B12: No components found for: B12  FOLATE:  No results found for: FOLATE  IRON:  No results found for: IRON  Iron Saturation:  No components found for: PERCENTFE  TIBC:  No results found for: TIBC  FERRITIN:  No results found for: FERRITIN  RPR:  No results found for: RPR  JUSTIN:  No results found for: ANATITER, JUSTIN  AMYLASE:  No results found for: AMYLASE  LIPASE:  No results found for: LIPASE    IMAGING    No results found.   ASSESSMENT AND PLAN:    Rectal bleeding on eliquis for chronic atrial fibrillation  History of precancerous colon polyps  HGB is stable  Consider resuming diet when okay with surgery

## 2020-10-10 NOTE — PLAN OF CARE
Problem: Falls - Risk of:  Goal: Will remain free from falls  Description: Will remain free from falls  Outcome: Met This Shift     Problem: Falls - Risk of:  Goal: Absence of physical injury  Description: Absence of physical injury  Outcome: Met This Shift     Problem: Bleeding:  Goal: Will show no signs and symptoms of excessive bleeding  Description: Will show no signs and symptoms of excessive bleeding  Outcome: Met This Shift

## 2020-10-11 LAB
ANION GAP SERPL CALCULATED.3IONS-SCNC: 6 MMOL/L (ref 7–16)
BASOPHILS ABSOLUTE: 0.05 E9/L (ref 0–0.2)
BASOPHILS RELATIVE PERCENT: 1 % (ref 0–2)
BUN BLDV-MCNC: 13 MG/DL (ref 8–23)
CALCIUM SERPL-MCNC: 9.3 MG/DL (ref 8.6–10.2)
CHLORIDE BLD-SCNC: 108 MMOL/L (ref 98–107)
CO2: 22 MMOL/L (ref 22–29)
CREAT SERPL-MCNC: 0.8 MG/DL (ref 0.7–1.2)
EOSINOPHILS ABSOLUTE: 0.17 E9/L (ref 0.05–0.5)
EOSINOPHILS RELATIVE PERCENT: 3.5 % (ref 0–6)
GFR AFRICAN AMERICAN: >60
GFR NON-AFRICAN AMERICAN: >60 ML/MIN/1.73
GLUCOSE BLD-MCNC: 88 MG/DL (ref 74–99)
HCT VFR BLD CALC: 31.8 % (ref 37–54)
HEMOGLOBIN: 10.4 G/DL (ref 12.5–16.5)
IMMATURE GRANULOCYTES #: 0.01 E9/L
IMMATURE GRANULOCYTES %: 0.2 % (ref 0–5)
LYMPHOCYTES ABSOLUTE: 1.22 E9/L (ref 1.5–4)
LYMPHOCYTES RELATIVE PERCENT: 25 % (ref 20–42)
MCH RBC QN AUTO: 30.7 PG (ref 26–35)
MCHC RBC AUTO-ENTMCNC: 32.7 % (ref 32–34.5)
MCV RBC AUTO: 93.8 FL (ref 80–99.9)
METER GLUCOSE: 101 MG/DL (ref 74–99)
METER GLUCOSE: 103 MG/DL (ref 74–99)
METER GLUCOSE: 95 MG/DL (ref 74–99)
METER GLUCOSE: 97 MG/DL (ref 74–99)
MONOCYTES ABSOLUTE: 0.43 E9/L (ref 0.1–0.95)
MONOCYTES RELATIVE PERCENT: 8.8 % (ref 2–12)
NEUTROPHILS ABSOLUTE: 3 E9/L (ref 1.8–7.3)
NEUTROPHILS RELATIVE PERCENT: 61.5 % (ref 43–80)
PDW BLD-RTO: 17.3 FL (ref 11.5–15)
PLATELET # BLD: 117 E9/L (ref 130–450)
PMV BLD AUTO: 11.2 FL (ref 7–12)
POTASSIUM REFLEX MAGNESIUM: 4.2 MMOL/L (ref 3.5–5)
RBC # BLD: 3.39 E12/L (ref 3.8–5.8)
SODIUM BLD-SCNC: 136 MMOL/L (ref 132–146)
WBC # BLD: 4.9 E9/L (ref 4.5–11.5)

## 2020-10-11 PROCEDURE — 6360000002 HC RX W HCPCS: Performed by: INTERNAL MEDICINE

## 2020-10-11 PROCEDURE — 6370000000 HC RX 637 (ALT 250 FOR IP): Performed by: INTERNAL MEDICINE

## 2020-10-11 PROCEDURE — 85025 COMPLETE CBC W/AUTO DIFF WBC: CPT

## 2020-10-11 PROCEDURE — 2060000000 HC ICU INTERMEDIATE R&B

## 2020-10-11 PROCEDURE — 82962 GLUCOSE BLOOD TEST: CPT

## 2020-10-11 PROCEDURE — 6370000000 HC RX 637 (ALT 250 FOR IP): Performed by: SURGERY

## 2020-10-11 PROCEDURE — 2580000003 HC RX 258: Performed by: SURGERY

## 2020-10-11 PROCEDURE — 2580000003 HC RX 258: Performed by: INTERNAL MEDICINE

## 2020-10-11 PROCEDURE — 80048 BASIC METABOLIC PNL TOTAL CA: CPT

## 2020-10-11 PROCEDURE — 36415 COLL VENOUS BLD VENIPUNCTURE: CPT

## 2020-10-11 PROCEDURE — 96376 TX/PRO/DX INJ SAME DRUG ADON: CPT

## 2020-10-11 PROCEDURE — G0378 HOSPITAL OBSERVATION PER HR: HCPCS

## 2020-10-11 RX ORDER — AMLODIPINE BESYLATE 5 MG/1
5 TABLET ORAL 2 TIMES DAILY
Status: DISCONTINUED | OUTPATIENT
Start: 2020-10-11 | End: 2020-10-12 | Stop reason: HOSPADM

## 2020-10-11 RX ADMIN — LISINOPRIL 10 MG: 10 TABLET ORAL at 09:06

## 2020-10-11 RX ADMIN — MULTIPLE VITAMINS W/ MINERALS TAB 1 TABLET: TAB at 09:10

## 2020-10-11 RX ADMIN — AMLODIPINE BESYLATE 5 MG: 5 TABLET ORAL at 09:06

## 2020-10-11 RX ADMIN — SODIUM CHLORIDE: 9 INJECTION, SOLUTION INTRAVENOUS at 04:15

## 2020-10-11 RX ADMIN — APIXABAN 5 MG: 5 TABLET, FILM COATED ORAL at 21:01

## 2020-10-11 RX ADMIN — METOPROLOL TARTRATE 12.5 MG: 25 TABLET, FILM COATED ORAL at 09:07

## 2020-10-11 RX ADMIN — HYDRALAZINE HYDROCHLORIDE 10 MG: 20 INJECTION, SOLUTION INTRAMUSCULAR; INTRAVENOUS at 11:34

## 2020-10-11 RX ADMIN — SODIUM CHLORIDE, PRESERVATIVE FREE 10 ML: 5 INJECTION INTRAVENOUS at 21:06

## 2020-10-11 RX ADMIN — ATORVASTATIN CALCIUM 40 MG: 40 TABLET, FILM COATED ORAL at 09:06

## 2020-10-11 RX ADMIN — SODIUM CHLORIDE, PRESERVATIVE FREE 10 ML: 5 INJECTION INTRAVENOUS at 09:06

## 2020-10-11 RX ADMIN — AMLODIPINE BESYLATE 5 MG: 5 TABLET ORAL at 21:01

## 2020-10-11 RX ADMIN — METOPROLOL TARTRATE 12.5 MG: 25 TABLET, FILM COATED ORAL at 21:01

## 2020-10-11 RX ADMIN — Medication 1000 UNITS: at 09:06

## 2020-10-11 ASSESSMENT — PAIN SCALES - GENERAL
PAINLEVEL_OUTOF10: 0

## 2020-10-11 NOTE — PROGRESS NOTES
PT SEEN AND EXAMINED. Chart reviewed. meds reviewed. D/w nursing + family as available. EXAM: IN GENERAL, NAD. AWAKE AND ALERT. ROS NEGx10 EXCEPT: he feels much better. Blood pressure is high  BP (!) 173/83   Pulse 66   Temp 97.7 °F (36.5 °C) (Oral)   Resp 16   Ht 6' 3\" (1.905 m)   Wt 200 lb (90.7 kg)   SpO2 100%   BMI 25.00 kg/m²   GEN: A+O NAD. HEENT: NCAT. EOMI. SIA  NECK: NO JVD. TRACH MIDLINE. NO BRUITS. NO THYROMEGALY. LUNGS: CTA BL NO RALES, RHONCHI OR WHEEZES. GOOD EXCURSION. CV: Regular rate and rhythm, NO Murmurs, Rubs, Or gallops  ABD: Soft. Nontender. Normal bowel sounds. No organomegaly  EXT:No clubbing cyanosis or edema  Neuro: Alert and oriented x 3. No focal motor deficits. No sensory deficits. Reflexes appear intact.   Labs/data reviewedLABS: CBC with Differential:    Lab Results   Component Value Date    WBC 4.9 10/11/2020    RBC 3.39 10/11/2020    HGB 10.4 10/11/2020    HCT 31.8 10/11/2020     10/11/2020    MCV 93.8 10/11/2020    MCH 30.7 10/11/2020    MCHC 32.7 10/11/2020    RDW 17.3 10/11/2020    LYMPHOPCT 25.0 10/11/2020    MONOPCT 8.8 10/11/2020    BASOPCT 1.0 10/11/2020    MONOSABS 0.43 10/11/2020    LYMPHSABS 1.22 10/11/2020    EOSABS 0.17 10/11/2020    BASOSABS 0.05 10/11/2020     Platelets:    Lab Results   Component Value Date     10/11/2020     CMP:    Lab Results   Component Value Date     10/11/2020    K 4.2 10/11/2020     10/11/2020    CO2 22 10/11/2020    BUN 13 10/11/2020    CREATININE 0.8 10/11/2020    GFRAA >60 10/11/2020    LABGLOM >60 10/11/2020    GLUCOSE 88 10/11/2020    PROT 7.9 10/09/2020    LABALBU 4.6 10/09/2020    CALCIUM 9.3 10/11/2020    BILITOT 0.4 10/09/2020    ALKPHOS 138 10/09/2020    AST 22 10/09/2020    ALT 25 10/09/2020     Magnesium:    Lab Results   Component Value Date    MG 1.9 05/05/2019     LDH:  No results found for: LDH  PT/INR:    Lab Results   Component Value Date    PROTIME 18.2 10/09/2020    INR 1.5 10/09/2020 Last 3 Troponin:    Lab Results   Component Value Date    TROPONINI <0.01 05/06/2019    TROPONINI <0.01 05/05/2019    TROPONINI <0.01 05/05/2019     ABG:  No results found for: PH, PCO2, PO2, HCO3, BE, THGB, TCO2, O2SAT  IRON:  No results found for: IRON  IMAGING    No results found. Medications:    Scheduled Meds:   apixaban  5 mg Oral BID    metoprolol tartrate  12.5 mg Oral BID    therapeutic multivitamin-minerals  1 tablet Oral Daily    Vitamin D  1,000 Units Oral Daily    amLODIPine  5 mg Oral Daily    atorvastatin  40 mg Oral Daily    lisinopril  10 mg Oral Daily    insulin lispro  0-6 Units Subcutaneous TID WC    insulin lispro  0-3 Units Subcutaneous Nightly    sodium chloride flush  10 mL Intravenous 2 times per day       Continuous Infusions:   dextrose         PRN Meds:glucose, dextrose, glucagon (rDNA), dextrose, sodium chloride flush, acetaminophen **OR** acetaminophen, polyethylene glycol, promethazine **OR** ondansetron, hydrALAZINE    A/P:      Patient Active Problem List   Diagnosis    Hypertension    Type 2 diabetes mellitus ([facility] [address]    ED (erectile dysfunction)    Type 2 diabetes mellitus without complication (HCC)    Hx of colonic polyps    Chronic gout    MGUS (monoclonal gammopathy of unknown significance)    Near syncope    Aortic stenosis, mild    Mild dilation of ascending aorta (HCC)    Presence of permanent cardiac pacemaker    Aortocoronary bypass status    H/O mitral valve repair    H/O aortic valve replacement    Pure hypercholesterolemia    Rectal bleed    GI bleed        PLAN:optimize blood pressure meds.  Restart anticoagulation tonight

## 2020-10-11 NOTE — PROGRESS NOTES
Attending Physician Progress Note     Current Meds:  apixaban (ELIQUIS) tablet 5 mg, BID  metoprolol tartrate (LOPRESSOR) tablet 12.5 mg, BID  therapeutic multivitamin-minerals 1 tablet, Daily  vitamin D (CHOLECALCIFEROL) tablet 1,000 Units, Daily  amLODIPine (NORVASC) tablet 5 mg, Daily  atorvastatin (LIPITOR) tablet 40 mg, Daily  lisinopril (PRINIVIL;ZESTRIL) tablet 10 mg, Daily  insulin lispro (HUMALOG) injection vial 0-6 Units, TID WC  insulin lispro (HUMALOG) injection vial 0-3 Units, Nightly  glucose (GLUTOSE) 40 % oral gel 15 g, PRN  dextrose 50 % IV solution, PRN  glucagon (rDNA) injection 1 mg, PRN  dextrose 5 % solution, PRN  sodium chloride flush 0.9 % injection 10 mL, 2 times per day  sodium chloride flush 0.9 % injection 10 mL, PRN  acetaminophen (TYLENOL) tablet 650 mg, Q6H PRN    Or  acetaminophen (TYLENOL) suppository 650 mg, Q6H PRN  polyethylene glycol (GLYCOLAX) packet 17 g, Daily PRN  promethazine (PHENERGAN) tablet 12.5 mg, Q6H PRN    Or  ondansetron (ZOFRAN) injection 4 mg, Q6H PRN  hydrALAZINE (APRESOLINE) injection 10 mg, Q4H PRN         Vitals/Labs:    Patient Vitals for the past 24 hrs:   BP Temp Temp src Pulse Resp SpO2 Weight   10/10/20 2330 (!) 147/81 98.5 °F (36.9 °C) Oral 81 16 98 % --   10/10/20 2030 (!) 179/84 98 °F (36.7 °C) Oral 101 16 100 % --   10/10/20 1457 (!) 142/81 98.1 °F (36.7 °C) Oral 95 16 -- --   10/10/20 0810 (!) 179/79 98.8 °F (37.1 °C) Oral 96 16 98 % --   10/10/20 0630 (!) 141/67 98 °F (36.7 °C) Oral 67 16 98 % --        I/O last 3 completed shifts: In: 2916.2 [P.O.:720; I.V.:2196.2]  Out: -   I/O this shift: In: 759.5 [I.V.:759.5]  Out: -     Recent Labs     10/09/20  2100 10/10/20  0516  10/10/20  1630 10/10/20  2215 10/11/20  0430   WBC 6.8 4.8  --   --   --  4.9   HGB 11.5* 11.1*  11.0*   < > 11.4* 10.6* 10.4*   HCT 37.2 34.3*  34.0*   < > 35.6* 32.8* 31.8*   * 107*  --   --   --  117*    < > = values in this interval not displayed.      Recent Labs

## 2020-10-12 VITALS
BODY MASS INDEX: 26.49 KG/M2 | HEIGHT: 75 IN | RESPIRATION RATE: 16 BRPM | HEART RATE: 67 BPM | OXYGEN SATURATION: 98 % | SYSTOLIC BLOOD PRESSURE: 132 MMHG | TEMPERATURE: 98.2 F | DIASTOLIC BLOOD PRESSURE: 70 MMHG | WEIGHT: 213 LBS

## 2020-10-12 LAB
ANION GAP SERPL CALCULATED.3IONS-SCNC: 9 MMOL/L (ref 7–16)
BASOPHILS ABSOLUTE: 0.04 E9/L (ref 0–0.2)
BASOPHILS RELATIVE PERCENT: 0.8 % (ref 0–2)
BUN BLDV-MCNC: 15 MG/DL (ref 8–23)
CALCIUM SERPL-MCNC: 9.3 MG/DL (ref 8.6–10.2)
CHLORIDE BLD-SCNC: 107 MMOL/L (ref 98–107)
CO2: 21 MMOL/L (ref 22–29)
CREAT SERPL-MCNC: 0.9 MG/DL (ref 0.7–1.2)
EOSINOPHILS ABSOLUTE: 0.21 E9/L (ref 0.05–0.5)
EOSINOPHILS RELATIVE PERCENT: 4.2 % (ref 0–6)
GFR AFRICAN AMERICAN: >60
GFR NON-AFRICAN AMERICAN: >60 ML/MIN/1.73
GLUCOSE BLD-MCNC: 91 MG/DL (ref 74–99)
HCT VFR BLD CALC: 32.8 % (ref 37–54)
HEMOGLOBIN: 10.8 G/DL (ref 12.5–16.5)
IMMATURE GRANULOCYTES #: 0.01 E9/L
IMMATURE GRANULOCYTES %: 0.2 % (ref 0–5)
LYMPHOCYTES ABSOLUTE: 1.3 E9/L (ref 1.5–4)
LYMPHOCYTES RELATIVE PERCENT: 26.1 % (ref 20–42)
MCH RBC QN AUTO: 30.6 PG (ref 26–35)
MCHC RBC AUTO-ENTMCNC: 32.9 % (ref 32–34.5)
MCV RBC AUTO: 92.9 FL (ref 80–99.9)
METER GLUCOSE: 107 MG/DL (ref 74–99)
METER GLUCOSE: 93 MG/DL (ref 74–99)
MONOCYTES ABSOLUTE: 0.55 E9/L (ref 0.1–0.95)
MONOCYTES RELATIVE PERCENT: 11 % (ref 2–12)
NEUTROPHILS ABSOLUTE: 2.87 E9/L (ref 1.8–7.3)
NEUTROPHILS RELATIVE PERCENT: 57.7 % (ref 43–80)
PDW BLD-RTO: 17.2 FL (ref 11.5–15)
PLATELET # BLD: 116 E9/L (ref 130–450)
PMV BLD AUTO: 10.6 FL (ref 7–12)
POTASSIUM REFLEX MAGNESIUM: 4.3 MMOL/L (ref 3.5–5)
RBC # BLD: 3.53 E12/L (ref 3.8–5.8)
SODIUM BLD-SCNC: 137 MMOL/L (ref 132–146)
WBC # BLD: 5 E9/L (ref 4.5–11.5)

## 2020-10-12 PROCEDURE — 6370000000 HC RX 637 (ALT 250 FOR IP): Performed by: SURGERY

## 2020-10-12 PROCEDURE — 36415 COLL VENOUS BLD VENIPUNCTURE: CPT

## 2020-10-12 PROCEDURE — 82962 GLUCOSE BLOOD TEST: CPT

## 2020-10-12 PROCEDURE — G0378 HOSPITAL OBSERVATION PER HR: HCPCS

## 2020-10-12 PROCEDURE — 80048 BASIC METABOLIC PNL TOTAL CA: CPT

## 2020-10-12 PROCEDURE — 6370000000 HC RX 637 (ALT 250 FOR IP): Performed by: INTERNAL MEDICINE

## 2020-10-12 PROCEDURE — 2580000003 HC RX 258: Performed by: INTERNAL MEDICINE

## 2020-10-12 PROCEDURE — 85025 COMPLETE CBC W/AUTO DIFF WBC: CPT

## 2020-10-12 RX ORDER — AMLODIPINE BESYLATE 5 MG/1
5 TABLET ORAL 2 TIMES DAILY
Qty: 30 TABLET | Refills: 3 | Status: SHIPPED | OUTPATIENT
Start: 2020-10-12 | End: 2022-07-28 | Stop reason: ALTCHOICE

## 2020-10-12 RX ADMIN — LISINOPRIL 10 MG: 10 TABLET ORAL at 09:50

## 2020-10-12 RX ADMIN — SODIUM CHLORIDE, PRESERVATIVE FREE 10 ML: 5 INJECTION INTRAVENOUS at 09:50

## 2020-10-12 RX ADMIN — METOPROLOL TARTRATE 12.5 MG: 25 TABLET, FILM COATED ORAL at 09:50

## 2020-10-12 RX ADMIN — AMLODIPINE BESYLATE 5 MG: 5 TABLET ORAL at 09:50

## 2020-10-12 RX ADMIN — APIXABAN 5 MG: 5 TABLET, FILM COATED ORAL at 09:50

## 2020-10-12 RX ADMIN — MULTIPLE VITAMINS W/ MINERALS TAB 1 TABLET: TAB at 09:50

## 2020-10-12 RX ADMIN — Medication 1000 UNITS: at 09:50

## 2020-10-12 RX ADMIN — ATORVASTATIN CALCIUM 40 MG: 40 TABLET, FILM COATED ORAL at 09:50

## 2020-10-12 ASSESSMENT — PAIN SCALES - GENERAL: PAINLEVEL_OUTOF10: 0

## 2020-10-12 NOTE — PROGRESS NOTES
Subjective: The patient is awake and alert. No problems overnight. Denies chest pain, angina, and dyspnea. Denies abdominal pain. Tolerating diet. No nausea or vomiting. Objective:  Pt resting in nad  BP (!) 144/72   Pulse 65   Temp 98.3 °F (36.8 °C) (Oral)   Resp 16   Ht 6' 3\" (1.905 m)   Wt 213 lb (96.6 kg)   SpO2 98%   BMI 26.62 kg/m²   HEENT no adenopathy no bruits  Heart:  RRR, no murmurs, gallops, or rubs. Lungs:  CTA bilaterally, no wheeze, rales or rhonchi  Abd: bowel sounds present, nontender, nondistended, no masses  Extrem:  No clubbing, cyanosis, or edema  WBC/Hgb/Hct/Plts:  5.0/10.8/32.8/116 (10/12 2225) basic metabolic panel     Assessment:    Patient Active Problem List   Diagnosis    Hypertension    Type 2 diabetes mellitus ([facility] [address]    ED (erectile dysfunction)    Type 2 diabetes mellitus without complication (HCC)    Hx of colonic polyps    Chronic gout    MGUS (monoclonal gammopathy of unknown significance)    Near syncope    Aortic stenosis, mild    Mild dilation of ascending aorta (HCC)    Presence of permanent cardiac pacemaker    Aortocoronary bypass status    H/O mitral valve repair    H/O aortic valve replacement    Pure hypercholesterolemia    Rectal bleed    GI bleed       Plan:   Will discharge to home today         Merry Caballero  7:34 AM  10/12/2020

## 2020-10-12 NOTE — PROGRESS NOTES
GENERAL SURGERY  DAILY PROGRESS NOTE  10/12/2020  CC: hematochezia    Subjective:  No evidence of bleeding overnight. Hgb this am 10.8 from 10.4    Objective:  BP (!) 144/72   Pulse 65   Temp 98.3 °F (36.8 °C) (Oral)   Resp 16   Ht 6' 3\" (1.905 m)   Wt 213 lb (96.6 kg)   SpO2 98%   BMI 26.62 kg/m²     GENERAL:  Laying in bed, awake, alert, cooperative, no apparent distress  HEAD: Normocephalic, atraumatic  EYES: No sclera icterus, pupils equal  LUNGS:  No increased work of breathing  CARDIOVASCULAR:  RR  ABDOMEN:  Soft, non-tender, non-distended  EXTREMITIES: No edema or swelling  SKIN: Warm and dry    Assessment/Plan:  78 y.o. male hematochezia on Eliquis.  Suspect diverticular bleed    Carb control diet  Resumed Eliquis - monitor for signs of bleeding   Okay for d/c today if no new signs of bleeding     Electronically signed by Marquis Duran MD on 10/12/2020 at 6:34 AM     Seen/examined  Agree with above- discharge after lunch if no issues  Cynthia

## 2020-10-12 NOTE — CARE COORDINATION
Social Work:    Reviewed chart. Patient was admitted due to rectal bleeding. Mr. Davonte Martínez has a history prostrate cancer, as well as cardiac care at the [address] [place] [address]. Mr. Davonte Martínez resides independently in a one-story home with his wife, a former RN. He has a walker & cane from family if needed. Mr. Davonte Martínez is a patient of Dr. Carlos Townsend and he uses [facility] [address] in [place]. Upon discharge from [facility] Mr. Davonte Martínez has no present needs. Social work to continue to follow.     Electronically signed by TRACE Wagner on 10/12/2020 at 8:48 AM

## 2020-10-12 NOTE — CARE COORDINATION
CASE MANAGEMENT. ... Chart reviewed. Met with patient at the bedside. He is anticipating discharge to home today after lunch if no more rectal bleeding. SS notes reviewed. No discharge needs noted.

## 2020-10-13 NOTE — DISCHARGE SUMMARY
Physician Discharge Summary     Patient ID:  Merlinda Sato  [id number]  49 y.o.  1940    Admit date: 10/9/2020    Discharge date and time: 10/12/2020  1:24 PM     Admission Diagnoses: Rectal bleed [K62.5]  Rectal bleed [K62.5]  GI bleed [K92.2]  Rectal bleed [K62.5]  GI bleed [K92.2]    Discharge Diagnoses:   Patient Active Problem List   Diagnosis    Hypertension    Type 2 diabetes mellitus ([facility] [address]    ED (erectile dysfunction)    Type 2 diabetes mellitus without complication (HCC)    Hx of colonic polyps    Chronic gout    MGUS (monoclonal gammopathy of unknown significance)    Near syncope    Aortic stenosis, mild    Mild dilation of ascending aorta (HCC)    Presence of permanent cardiac pacemaker    Aortocoronary bypass status    H/O mitral valve repair    H/O aortic valve replacement    Pure hypercholesterolemia    Rectal bleed    GI bleed     Medications Discontinued During This Encounter   Medication Reason    colchicine (COLCRYS) 0.6 MG tablet     fluticasone (FLONASE) 50 MCG/ACT nasal spray     loratadine (CLARITIN) 10 MG tablet     metroNIDAZOLE (METROGEL) 0.75 % gel LIST CLEANUP    NIFEdipine (ADALAT CC) 30 MG extended release tablet     promethazine-dextromethorphan (PROMETHAZINE-DM) 6.25-15 MG/5ML syrup     acetaminophen (TYLENOL) tablet 500 mg DOSE ADJUSTMENT    0.9 % sodium chloride infusion     amLODIPine (NORVASC) tablet 5 mg     aspirin 81 MG chewable tablet Stop Taking at Discharge    amLODIPine (NORVASC) 5 MG tablet Stop Taking at Discharge    amLODIPine (NORVASC) tablet 5 mg Patient Discharge    apixaban (ELIQUIS) tablet 5 mg Patient Discharge    dextrose 5 % solution Patient Discharge    glucagon (rDNA) injection 1 mg Patient Discharge    dextrose 50 % IV solution Patient Discharge    glucose (GLUTOSE) 40 % oral gel 15 g Patient Discharge    insulin lispro (HUMALOG) injection vial 0-3 Units Patient Discharge    insulin lispro (HUMALOG) injection vial 0-6 Units Patient Discharge    lisinopril (PRINIVIL;ZESTRIL) tablet 10 mg Patient Discharge    atorvastatin (LIPITOR) tablet 40 mg Patient Discharge    vitamin D (CHOLECALCIFEROL) tablet 1,000 Units Patient Discharge    therapeutic multivitamin-minerals 1 tablet Patient Discharge    metoprolol tartrate (LOPRESSOR) tablet 12.5 mg Patient Discharge    hydrALAZINE (APRESOLINE) injection 10 mg Patient Discharge    ondansetron (ZOFRAN) injection 4 mg Patient Discharge    promethazine (PHENERGAN) tablet 12.5 mg Patient Discharge    polyethylene glycol (GLYCOLAX) packet 17 g Patient Discharge    acetaminophen (TYLENOL) suppository 650 mg Patient Discharge    acetaminophen (TYLENOL) tablet 650 mg Patient Discharge    sodium chloride flush 0.9 % injection 10 mL Patient Discharge    sodium chloride flush 0.9 % injection 10 mL Patient Discharge     No Known Allergies          Procedures: none    Hospital Course: Stable, he was eating well and working with PT, able to help perform ADLs. He had no signs of active bleeding and started back on his Eliquis. Discharge Exam:  See progress note from today    Disposition: to home in stable condition, long-term prognosis is fair. He will follow up with Dr. Nuha Stoll in one week.     Nghia Ott  10/13/2020

## 2020-10-13 NOTE — PROGRESS NOTES
Physician Progress Note      PATIENT:               Madelene Riedel  CSN #:                  [id number]  :                       1940  ADMIT DATE:       10/9/2020 9:30 PM  [address] DATE:        10/12/2020 1:24 PM  RESPONDING  PROVIDER #:        Sepideh Hernandez MD          QUERY TEXT:    Dear Attending Physician,    Patient admitted with rectal bleeding, noted to have suspected diverticular   disease per Surgery notes . If possible, please document in progress notes   and discharge summary the cause of the rectal bleeding: The medical record reflects the following:  Risk Factors: Eliquis for Chronic Afib  Clinical Indicators: Per Surgery 10/12,'. .rectal bleeding. Heddy  Heddy  Suspect   diverticular bleed Carb control diet Resumed Eliquis - monitor for signs of   bleeding     ? \"  Treatment: Hold anticoagulants and antiplatelet agents ,monitor hemoglobin    Thank you,  Millie Gonzalez RN [facility]  Clinical Documentation Improvement Specialist  [phone]  Options provided:  -- Rectal bleeding due to suspected diverticular bleed  -- Rectal bleeding due to Eliquis  -- Other - I will add my own diagnosis  -- Disagree - Not applicable / Not valid  -- Disagree - Clinically unable to determine / Unknown  -- Refer to Clinical Documentation Reviewer    PROVIDER RESPONSE TEXT:    This patient has rectal bleeding due to suspected diverticular bleed .     Query created by: Ketan Adan on 10/12/2020 3:33 PM      Electronically signed by:  Sepideh Hernandez MD 10/13/2020 4:36 AM

## 2021-04-14 ENCOUNTER — TELEPHONE (OUTPATIENT)
Dept: PHARMACY | Facility: CLINIC | Age: 81
End: 2021-04-14

## 2021-04-14 NOTE — LETTER
REFILL REQUEST  Prescriber:  [address], [address] [facility]   [phone] (Fax)       Patient:  Gilles Taveras 1940  Francesco Lawson [address]  [phone] (home) [phone] (work)       Patient requests 90-day supply of lisinopril to improve medication adherence. Please approve/deny below request and send to the pharmacy. Medication: lisinopril 40 mg                Sig: Take 1 tab po daily            #: 90       R: 2         Prescriber Response:    Prescription(s) approved (please Marshall one):  YES  NO    Other response: ________________________________________      ______________________________________   __________________  Authorized By       Date     Pharmacy:   [id number]  C/ [address] Leonard [address] [place] [address] [id number] [place]     The information transmitted is intended only for the person or entity to which it is addressed and may contain confidential and/or privileged material. Any review, retransmission, dissemination or other use of, or taking of any action in reliance upon, this information by persons or entities other than the intended recipient is prohibited. This document contains information covered under the Privacy Act, 5 (a), and/or the [employer] and Accountability Act ([address]) and its various implementing regulations and must be protected in accordance with those provisions. If you received this in error, please contact the sender and delete the material from any computer.

## 2021-04-14 NOTE — TELEPHONE ENCOUNTER
POPULATION HEALTH CLINICAL PHARMACY REVIEW: ADHERENCE REVIEW  Identified care gap per [employer]; fills at Non-Preferred Pharmacy [facility]: ACE/ARB and Statin adherence    Last Visit: unknown    Patient found in Outcomes MTM and is not currently eligible for CMR/TIP    ASSESSMENT  ACE/ARB ADHERENCE    Per Insurance Records through March 2021 (2020 [place] = 35%; YTD PDC = 94%; Potential Fail Date: 5/18/21):   LISINOPRIL   TAB 40MG last filled on 2/4/21 for 30 day supply. Next refill due: 3/9/21    Per Outcomes MTM Records:  12/7/20, 1/8/21,2/4/21     Per [facility]:   Has 1 RF of #30 remaining. Past due to refill. Will outreach for 90 day supply to match other meds  Raman Velasquez MD    Per 4/1 cardiology:  \"Systolic BPs have been running between 984-141 with diastolics 36-32X\"  Continue the same medication regimen. BP Readings from Last 3 Encounters:   10/12/20 132/70   05/08/20 120/68   03/16/20 (!) 174/72     CrCl cannot be calculated (Patient's most recent lab result is older than the maximum 120 days allowed. ). STATIN ADHERENCE    Per Insurance Records through March 2021 (2020 [place] = 100%; YTD PDC = filled only once):   ATORVASTATIN TAB 40M last filled on 1/24/21 for 90 day supply. Next refill due: 4/24/21      Lab Results   Component Value Date    CHOL 210 04/18/2018    TRIG 72 04/05/2019    HDL 69 04/05/2019    LDLCHOLESTEROL 102 04/05/2019    LDLCALC 109 04/18/2018     ALT   Date Value Ref Range Status   10/09/2020 25 0 - 40 U/L Final     AST   Date Value Ref Range Status   10/09/2020 22 0 - 39 U/L Final     The ASCVD Risk score (Romy Li., et al., 2013) failed to calculate for the following reasons:     The 2013 ASCVD risk score is only valid for ages 36 to 78     PLAN  The following are interventions that have been identified:   - Patient overdue refilling LISINOPRIL   TAB 40MG and active on home medication list.   - Patient eligible for 90 day supply of LISINOPRIL TAB 40MG    Reached patient to review. Verified medication instructions. Very pleasant gentleman. Using up existing stock on 20mg taking 2tabs. Also another partial bottle of 40mg tabs. Won't need a refill right away but would like new 90 day script. Will route to PharmD to facilitate refill auth. Will follow up with patient/pharmacy to inactive if necessary. . No future appointments. Neva Walden CPhT.    [facility], toll free: [phone], option 7

## 2021-04-16 NOTE — TELEPHONE ENCOUNTER
Noted that patient failed DARLYN measure in 2020 d/t using discount cards instead of insurance at the pharmacy. Reviewed patient's chart - appears his lisinopril dose was increased upon discharge from the [facility] on 1/8/21 to 40 mg once daily. Will prepare and send a fax refill request for lisinopril 40 mg once daily to Dr. Edmond Valiente (patient's cardiologist) today and will sign off.      Adonay Torres, PharmD, [facility]  Direct: [phone]  Department, toll free [phone], option 7

## 2021-04-19 NOTE — TELEPHONE ENCOUNTER
CLINICAL PHARMACY CONSULT: MED RECONCILIATION/REVIEW ADDENDUM    For Pharmacy Admin Tracking Only    PHSO: Yes  Total # of Interventions Recommended: 1  - New Order #: 1 New Medication Order Reason(s): Adherence  Recommended intervention potential cost savings: 1  Total Interventions Accepted: 1  Time Spent (min): 15    Neva Walden CPhT.   [address]

## 2021-08-13 ENCOUNTER — TELEPHONE (OUTPATIENT)
Dept: PHARMACY | Facility: CLINIC | Age: 81
End: 2021-08-13

## 2021-08-13 NOTE — TELEPHONE ENCOUNTER
[facility] CLINICAL PHARMACY REVIEW: ADHERENCE REVIEW  Identified care gap per [employer]; fills at [facility] : ACE/ARB, Diabetes and Statin adherence    Last Visit: 4/16/21    Patient also appears to be prescribed: METFORMIN TAB 500MG,  ATORVASTATIN TAB 40MG and LISINOPRIL   TAB 40MG    Patient found in Outcomes MTM and is not currently eligible for CMR/TIP    ASSESSMENT  ACE/ARB ADHERENCE    Per Insurance Records through [facility] (2020 [place] = 35%; YTD [place] = 79%; Potential Fail Date: 8/16/21):   Lisinopril last filled on 4/16/21 for 90 day supply. Next refill due: 7/15/21    Per Reconciled Dispense Report:  Lisinopril last filled on 3/29/21 for 90 day supply. Per [facility]:   Lisinopril last picked up on 7/18/21 for 90 day supply. 2 refills remaining. Billed through [employer]     BP Readings from Last 3 Encounters:   10/12/20 132/70   05/08/20 120/68   03/16/20 (!) 174/72     CrCl cannot be calculated (Patient's most recent lab result is older than the maximum 120 days allowed. ). DIABETES ADHERENCE    Per Insurance Records through [facility] (2020 [place] = 27%; YTD PDC = 100%; Potential Fail Date: 9/13/2021):   Metformin last filled on 4/28/2021 for 90 day supply. Next refill due: 7/27/21    Per Reconciled Dispense Report:  Metformin last filled on 3/29/21 for 90 day supply. Per [facility]:   Metformin last picked up on 7/24/21 for 90 day supply. 1 refills remaining. Billed through [facility] [employer]   Component Value Date    LABA1C 5.8 03/16/2020    LABA1C 5.7 04/05/2019    LABA1C 5.7 10/05/2018     NOTE A1c not yet completed this calendar year    [facility]    Per Insurance Records through [place] (2020 [place] = 100%; YTD [place] = 98%; Potential Fail Date: 9/28/2021): Atorvastatin last filled on 4/28/21 for 90 day supply. Next refill due: 7/27/21    Per Reconciled Dispense Report:  Atorvastatin last filled on 6/17/21 for 60 day supply.      Per [facility]:   Atorvastatin last picked up on 7/24/21 for 90 day supply. 1 refills remaining. Billed through [employer]   Component Value Date    CHOL 210 04/18/2018    TRIG 72 04/05/2019    HDL 69 04/05/2019    LDLCHOLESTEROL 102 04/05/2019    LDLCALC 109 04/18/2018     ALT   Date Value Ref Range Status   10/09/2020 25 0 - 40 U/L Final     AST   Date Value Ref Range Status   10/09/2020 22 0 - 39 U/L Final     The ASCVD Risk score (Paola Sloan, et al., 2013) failed to calculate for the following reasons: The 2013 ASCVD risk score is only valid for ages 36 to 78     PLAN  No patient out reach planned at this time. Patient appears to be adherent and filling a 90ds at this time. No future appointments.     [address]  // Department, toll free [phone], Option 2  Direct: [phone]  Department, toll free [phone], option [address] in place:  No   Time Spent (min): 20

## 2022-07-28 ENCOUNTER — APPOINTMENT (OUTPATIENT)
Dept: GENERAL RADIOLOGY | Age: 82
DRG: 871 | End: 2022-07-28
Payer: MEDICARE

## 2022-07-28 ENCOUNTER — HOSPITAL ENCOUNTER (INPATIENT)
Age: 82
LOS: 2 days | Discharge: HOME HEALTH | DRG: 871 | End: 2022-07-30
Attending: EMERGENCY MEDICINE | Admitting: STUDENT IN AN ORGANIZED HEALTH CARE EDUCATION/TRAINING PROGRAM
Payer: MEDICARE

## 2022-07-28 ENCOUNTER — APPOINTMENT (OUTPATIENT)
Dept: CT IMAGING | Age: 82
DRG: 871 | End: 2022-07-28
Payer: MEDICARE

## 2022-07-28 DIAGNOSIS — J96.01 ACUTE RESPIRATORY FAILURE WITH HYPOXIA (HCC): ICD-10-CM

## 2022-07-28 DIAGNOSIS — J18.9 PNEUMONIA OF LEFT LOWER LOBE DUE TO INFECTIOUS ORGANISM: Primary | ICD-10-CM

## 2022-07-28 DIAGNOSIS — U07.1 COVID-19: ICD-10-CM

## 2022-07-28 PROBLEM — J96.00 ACUTE RESPIRATORY FAILURE DUE TO COVID-19 (HCC): Status: ACTIVE | Noted: 2022-07-28

## 2022-07-28 LAB
ALBUMIN SERPL-MCNC: 4.1 G/DL (ref 3.5–5.2)
ALBUMIN SERPL-MCNC: 4.2 G/DL (ref 3.5–5.2)
ALP BLD-CCNC: 100 U/L (ref 40–129)
ALP BLD-CCNC: 106 U/L (ref 40–129)
ALT SERPL-CCNC: 35 U/L (ref 0–40)
ALT SERPL-CCNC: 35 U/L (ref 0–40)
ANION GAP SERPL CALCULATED.3IONS-SCNC: 13 MMOL/L (ref 7–16)
ANION GAP SERPL CALCULATED.3IONS-SCNC: 15 MMOL/L (ref 7–16)
AST SERPL-CCNC: 37 U/L (ref 0–39)
AST SERPL-CCNC: 40 U/L (ref 0–39)
BASOPHILS ABSOLUTE: 0 E9/L (ref 0–0.2)
BASOPHILS RELATIVE PERCENT: 0 % (ref 0–2)
BILIRUB SERPL-MCNC: 0.6 MG/DL (ref 0–1.2)
BILIRUB SERPL-MCNC: 0.8 MG/DL (ref 0–1.2)
BUN BLDV-MCNC: 26 MG/DL (ref 6–23)
BUN BLDV-MCNC: 27 MG/DL (ref 6–23)
C-REACTIVE PROTEIN: 6.6 MG/DL (ref 0–0.4)
CALCIUM SERPL-MCNC: 8.8 MG/DL (ref 8.6–10.2)
CALCIUM SERPL-MCNC: 8.8 MG/DL (ref 8.6–10.2)
CHLORIDE BLD-SCNC: 102 MMOL/L (ref 98–107)
CHLORIDE BLD-SCNC: 103 MMOL/L (ref 98–107)
CO2: 20 MMOL/L (ref 22–29)
CO2: 21 MMOL/L (ref 22–29)
CREAT SERPL-MCNC: 1.1 MG/DL (ref 0.7–1.2)
CREAT SERPL-MCNC: 1.1 MG/DL (ref 0.7–1.2)
D DIMER: 974 NG/ML DDU
EKG ATRIAL RATE: 76 BPM
EKG P-R INTERVAL: 82 MS
EKG Q-T INTERVAL: 422 MS
EKG QRS DURATION: 144 MS
EKG QTC CALCULATION (BAZETT): 474 MS
EKG R AXIS: 92 DEGREES
EKG T AXIS: -75 DEGREES
EKG VENTRICULAR RATE: 76 BPM
EOSINOPHILS ABSOLUTE: 0 E9/L (ref 0.05–0.5)
EOSINOPHILS RELATIVE PERCENT: 0 % (ref 0–6)
GFR AFRICAN AMERICAN: >60
GFR AFRICAN AMERICAN: >60
GFR NON-AFRICAN AMERICAN: >60 ML/MIN/1.73
GFR NON-AFRICAN AMERICAN: >60 ML/MIN/1.73
GLUCOSE BLD-MCNC: 107 MG/DL (ref 74–99)
GLUCOSE BLD-MCNC: 128 MG/DL (ref 74–99)
HCT VFR BLD CALC: 35.7 % (ref 37–54)
HCT VFR BLD CALC: 37.4 % (ref 37–54)
HEMOGLOBIN: 12 G/DL (ref 12.5–16.5)
HEMOGLOBIN: 12.2 G/DL (ref 12.5–16.5)
IMMATURE GRANULOCYTES #: 0.01 E9/L
IMMATURE GRANULOCYTES %: 0.2 % (ref 0–5)
INR BLD: 1.3
LACTIC ACID, SEPSIS: 1.4 MMOL/L (ref 0.5–1.9)
LACTIC ACID: 1.8 MMOL/L (ref 0.5–2.2)
LYMPHOCYTES ABSOLUTE: 0.4 E9/L (ref 1.5–4)
LYMPHOCYTES RELATIVE PERCENT: 7.7 % (ref 20–42)
MCH RBC QN AUTO: 32.2 PG (ref 26–35)
MCH RBC QN AUTO: 32.9 PG (ref 26–35)
MCHC RBC AUTO-ENTMCNC: 32.6 % (ref 32–34.5)
MCHC RBC AUTO-ENTMCNC: 33.6 % (ref 32–34.5)
MCV RBC AUTO: 97.8 FL (ref 80–99.9)
MCV RBC AUTO: 98.7 FL (ref 80–99.9)
METER GLUCOSE: 122 MG/DL (ref 74–99)
METER GLUCOSE: 153 MG/DL (ref 74–99)
METER GLUCOSE: 228 MG/DL (ref 74–99)
MONOCYTES ABSOLUTE: 0.4 E9/L (ref 0.1–0.95)
MONOCYTES RELATIVE PERCENT: 7.7 % (ref 2–12)
NEUTROPHILS ABSOLUTE: 4.39 E9/L (ref 1.8–7.3)
NEUTROPHILS RELATIVE PERCENT: 84.4 % (ref 43–80)
OVALOCYTES: ABNORMAL
PDW BLD-RTO: 14.6 FL (ref 11.5–15)
PDW BLD-RTO: 14.8 FL (ref 11.5–15)
PLATELET # BLD: 79 E9/L (ref 130–450)
PLATELET # BLD: 83 E9/L (ref 130–450)
PLATELET CONFIRMATION: NORMAL
PLATELET CONFIRMATION: NORMAL
PMV BLD AUTO: 10.9 FL (ref 7–12)
PMV BLD AUTO: 11.5 FL (ref 7–12)
POIKILOCYTES: ABNORMAL
POLYCHROMASIA: ABNORMAL
POTASSIUM REFLEX MAGNESIUM: 4 MMOL/L (ref 3.5–5)
POTASSIUM REFLEX MAGNESIUM: 4.2 MMOL/L (ref 3.5–5)
PRO-BNP: 2751 PG/ML (ref 0–450)
PROCALCITONIN: 0.83 NG/ML (ref 0–0.08)
PROTHROMBIN TIME: 14.5 SEC (ref 9.3–12.4)
RBC # BLD: 3.65 E12/L (ref 3.8–5.8)
RBC # BLD: 3.79 E12/L (ref 3.8–5.8)
SARS-COV-2, NAAT: DETECTED
SEDIMENTATION RATE, ERYTHROCYTE: 10 MM/HR (ref 0–15)
SODIUM BLD-SCNC: 137 MMOL/L (ref 132–146)
SODIUM BLD-SCNC: 137 MMOL/L (ref 132–146)
TEAR DROP CELLS: ABNORMAL
TOTAL PROTEIN: 7.3 G/DL (ref 6.4–8.3)
TOTAL PROTEIN: 7.5 G/DL (ref 6.4–8.3)
TROPONIN, HIGH SENSITIVITY: 52 NG/L (ref 0–11)
TROPONIN, HIGH SENSITIVITY: 55 NG/L (ref 0–11)
WBC # BLD: 5.2 E9/L (ref 4.5–11.5)
WBC # BLD: 8.1 E9/L (ref 4.5–11.5)

## 2022-07-28 PROCEDURE — 84484 ASSAY OF TROPONIN QUANT: CPT

## 2022-07-28 PROCEDURE — 80053 COMPREHEN METABOLIC PANEL: CPT

## 2022-07-28 PROCEDURE — 36415 COLL VENOUS BLD VENIPUNCTURE: CPT

## 2022-07-28 PROCEDURE — 2500000003 HC RX 250 WO HCPCS: Performed by: STUDENT IN AN ORGANIZED HEALTH CARE EDUCATION/TRAINING PROGRAM

## 2022-07-28 PROCEDURE — 6360000002 HC RX W HCPCS: Performed by: EMERGENCY MEDICINE

## 2022-07-28 PROCEDURE — 2580000003 HC RX 258: Performed by: EMERGENCY MEDICINE

## 2022-07-28 PROCEDURE — 85610 PROTHROMBIN TIME: CPT

## 2022-07-28 PROCEDURE — 87070 CULTURE OTHR SPECIMN AEROBIC: CPT

## 2022-07-28 PROCEDURE — 74177 CT ABD & PELVIS W/CONTRAST: CPT

## 2022-07-28 PROCEDURE — 6360000004 HC RX CONTRAST MEDICATION: Performed by: RADIOLOGY

## 2022-07-28 PROCEDURE — 6360000002 HC RX W HCPCS: Performed by: STUDENT IN AN ORGANIZED HEALTH CARE EDUCATION/TRAINING PROGRAM

## 2022-07-28 PROCEDURE — 85651 RBC SED RATE NONAUTOMATED: CPT

## 2022-07-28 PROCEDURE — 83880 ASSAY OF NATRIURETIC PEPTIDE: CPT

## 2022-07-28 PROCEDURE — 87449 NOS EACH ORGANISM AG IA: CPT

## 2022-07-28 PROCEDURE — 87040 BLOOD CULTURE FOR BACTERIA: CPT

## 2022-07-28 PROCEDURE — 71045 X-RAY EXAM CHEST 1 VIEW: CPT

## 2022-07-28 PROCEDURE — 86140 C-REACTIVE PROTEIN: CPT

## 2022-07-28 PROCEDURE — 84145 PROCALCITONIN (PCT): CPT

## 2022-07-28 PROCEDURE — 2060000000 HC ICU INTERMEDIATE R&B

## 2022-07-28 PROCEDURE — 83605 ASSAY OF LACTIC ACID: CPT

## 2022-07-28 PROCEDURE — 93005 ELECTROCARDIOGRAM TRACING: CPT | Performed by: EMERGENCY MEDICINE

## 2022-07-28 PROCEDURE — 71275 CT ANGIOGRAPHY CHEST: CPT

## 2022-07-28 PROCEDURE — 85025 COMPLETE CBC W/AUTO DIFF WBC: CPT

## 2022-07-28 PROCEDURE — 6370000000 HC RX 637 (ALT 250 FOR IP): Performed by: EMERGENCY MEDICINE

## 2022-07-28 PROCEDURE — 6370000000 HC RX 637 (ALT 250 FOR IP): Performed by: STUDENT IN AN ORGANIZED HEALTH CARE EDUCATION/TRAINING PROGRAM

## 2022-07-28 PROCEDURE — 87206 SMEAR FLUORESCENT/ACID STAI: CPT

## 2022-07-28 PROCEDURE — 85027 COMPLETE CBC AUTOMATED: CPT

## 2022-07-28 PROCEDURE — XW033E5 INTRODUCTION OF REMDESIVIR ANTI-INFECTIVE INTO PERIPHERAL VEIN, PERCUTANEOUS APPROACH, NEW TECHNOLOGY GROUP 5: ICD-10-PCS | Performed by: STUDENT IN AN ORGANIZED HEALTH CARE EDUCATION/TRAINING PROGRAM

## 2022-07-28 PROCEDURE — 73610 X-RAY EXAM OF ANKLE: CPT

## 2022-07-28 PROCEDURE — 2580000003 HC RX 258: Performed by: STUDENT IN AN ORGANIZED HEALTH CARE EDUCATION/TRAINING PROGRAM

## 2022-07-28 PROCEDURE — 85378 FIBRIN DEGRADE SEMIQUANT: CPT

## 2022-07-28 PROCEDURE — 82962 GLUCOSE BLOOD TEST: CPT

## 2022-07-28 PROCEDURE — 87635 SARS-COV-2 COVID-19 AMP PRB: CPT

## 2022-07-28 PROCEDURE — 2500000003 HC RX 250 WO HCPCS: Performed by: EMERGENCY MEDICINE

## 2022-07-28 PROCEDURE — 96374 THER/PROPH/DIAG INJ IV PUSH: CPT

## 2022-07-28 PROCEDURE — 99285 EMERGENCY DEPT VISIT HI MDM: CPT

## 2022-07-28 RX ORDER — ACETAMINOPHEN 650 MG/1
650 SUPPOSITORY RECTAL EVERY 6 HOURS PRN
Status: DISCONTINUED | OUTPATIENT
Start: 2022-07-28 | End: 2022-07-30 | Stop reason: HOSPADM

## 2022-07-28 RX ORDER — DEXAMETHASONE SODIUM PHOSPHATE 10 MG/ML
6 INJECTION INTRAMUSCULAR; INTRAVENOUS DAILY
Status: DISCONTINUED | OUTPATIENT
Start: 2022-07-29 | End: 2022-07-30 | Stop reason: HOSPADM

## 2022-07-28 RX ORDER — TAMSULOSIN HYDROCHLORIDE 0.4 MG/1
0.4 CAPSULE ORAL NIGHTLY
Status: DISCONTINUED | OUTPATIENT
Start: 2022-07-28 | End: 2022-07-30 | Stop reason: HOSPADM

## 2022-07-28 RX ORDER — INSULIN LISPRO 100 [IU]/ML
0-8 INJECTION, SOLUTION INTRAVENOUS; SUBCUTANEOUS
Status: DISCONTINUED | OUTPATIENT
Start: 2022-07-28 | End: 2022-07-30 | Stop reason: HOSPADM

## 2022-07-28 RX ORDER — DEXAMETHASONE SODIUM PHOSPHATE 10 MG/ML
10 INJECTION INTRAMUSCULAR; INTRAVENOUS ONCE
Status: COMPLETED | OUTPATIENT
Start: 2022-07-28 | End: 2022-07-28

## 2022-07-28 RX ORDER — ACETAMINOPHEN 500 MG
1000 TABLET ORAL ONCE
Status: COMPLETED | OUTPATIENT
Start: 2022-07-28 | End: 2022-07-28

## 2022-07-28 RX ORDER — ONDANSETRON 2 MG/ML
4 INJECTION INTRAMUSCULAR; INTRAVENOUS EVERY 6 HOURS PRN
Status: DISCONTINUED | OUTPATIENT
Start: 2022-07-28 | End: 2022-07-30 | Stop reason: HOSPADM

## 2022-07-28 RX ORDER — IPRATROPIUM BROMIDE AND ALBUTEROL SULFATE 2.5; .5 MG/3ML; MG/3ML
1 SOLUTION RESPIRATORY (INHALATION) ONCE
Status: COMPLETED | OUTPATIENT
Start: 2022-07-28 | End: 2022-07-28

## 2022-07-28 RX ORDER — ENOXAPARIN SODIUM 100 MG/ML
40 INJECTION SUBCUTANEOUS DAILY
Status: DISCONTINUED | OUTPATIENT
Start: 2022-07-28 | End: 2022-07-30 | Stop reason: HOSPADM

## 2022-07-28 RX ORDER — ACETAMINOPHEN 325 MG/1
650 TABLET ORAL EVERY 6 HOURS PRN
Status: DISCONTINUED | OUTPATIENT
Start: 2022-07-28 | End: 2022-07-30 | Stop reason: HOSPADM

## 2022-07-28 RX ORDER — SODIUM CHLORIDE 0.9 % (FLUSH) 0.9 %
10 SYRINGE (ML) INJECTION PRN
Status: DISCONTINUED | OUTPATIENT
Start: 2022-07-28 | End: 2022-07-30 | Stop reason: HOSPADM

## 2022-07-28 RX ORDER — TAMSULOSIN HYDROCHLORIDE 0.4 MG/1
0.4 CAPSULE ORAL NIGHTLY
COMMUNITY

## 2022-07-28 RX ORDER — ONDANSETRON 4 MG/1
4 TABLET, ORALLY DISINTEGRATING ORAL EVERY 8 HOURS PRN
Status: DISCONTINUED | OUTPATIENT
Start: 2022-07-28 | End: 2022-07-30 | Stop reason: HOSPADM

## 2022-07-28 RX ORDER — SODIUM CHLORIDE 9 MG/ML
INJECTION, SOLUTION INTRAVENOUS PRN
Status: DISCONTINUED | OUTPATIENT
Start: 2022-07-28 | End: 2022-07-30 | Stop reason: HOSPADM

## 2022-07-28 RX ORDER — LISINOPRIL 40 MG/1
40 TABLET ORAL EVERY MORNING
COMMUNITY

## 2022-07-28 RX ORDER — 0.9 % SODIUM CHLORIDE 0.9 %
30 INTRAVENOUS SOLUTION INTRAVENOUS PRN
Status: DISCONTINUED | OUTPATIENT
Start: 2022-07-28 | End: 2022-07-30 | Stop reason: HOSPADM

## 2022-07-28 RX ORDER — INSULIN LISPRO 100 [IU]/ML
0-4 INJECTION, SOLUTION INTRAVENOUS; SUBCUTANEOUS NIGHTLY
Status: DISCONTINUED | OUTPATIENT
Start: 2022-07-28 | End: 2022-07-30 | Stop reason: HOSPADM

## 2022-07-28 RX ORDER — SODIUM CHLORIDE 0.9 % (FLUSH) 0.9 %
10 SYRINGE (ML) INJECTION EVERY 12 HOURS SCHEDULED
Status: DISCONTINUED | OUTPATIENT
Start: 2022-07-28 | End: 2022-07-30 | Stop reason: HOSPADM

## 2022-07-28 RX ORDER — VITAMIN B COMPLEX
1000 TABLET ORAL EVERY MORNING
Status: DISCONTINUED | OUTPATIENT
Start: 2022-07-29 | End: 2022-07-30 | Stop reason: HOSPADM

## 2022-07-28 RX ORDER — SENNA PLUS 8.6 MG/1
1 TABLET ORAL DAILY PRN
Status: DISCONTINUED | OUTPATIENT
Start: 2022-07-28 | End: 2022-07-30 | Stop reason: HOSPADM

## 2022-07-28 RX ORDER — DEXTROSE MONOHYDRATE 100 MG/ML
INJECTION, SOLUTION INTRAVENOUS CONTINUOUS PRN
Status: DISCONTINUED | OUTPATIENT
Start: 2022-07-28 | End: 2022-07-30 | Stop reason: HOSPADM

## 2022-07-28 RX ADMIN — IOPAMIDOL 75 ML: 755 INJECTION, SOLUTION INTRAVENOUS at 09:14

## 2022-07-28 RX ADMIN — IPRATROPIUM BROMIDE AND ALBUTEROL SULFATE 1 AMPULE: .5; 2.5 SOLUTION RESPIRATORY (INHALATION) at 08:05

## 2022-07-28 RX ADMIN — ENOXAPARIN SODIUM 40 MG: 100 INJECTION SUBCUTANEOUS at 12:48

## 2022-07-28 RX ADMIN — SODIUM CHLORIDE, PRESERVATIVE FREE 10 ML: 5 INJECTION INTRAVENOUS at 20:30

## 2022-07-28 RX ADMIN — DOXYCYCLINE 100 MG: 100 INJECTION, POWDER, LYOPHILIZED, FOR SOLUTION INTRAVENOUS at 21:07

## 2022-07-28 RX ADMIN — REMDESIVIR 200 MG: 100 INJECTION, POWDER, LYOPHILIZED, FOR SOLUTION INTRAVENOUS at 15:25

## 2022-07-28 RX ADMIN — SODIUM CHLORIDE, PRESERVATIVE FREE 10 ML: 5 INJECTION INTRAVENOUS at 12:48

## 2022-07-28 RX ADMIN — DEXAMETHASONE SODIUM PHOSPHATE 10 MG: 10 INJECTION INTRAMUSCULAR; INTRAVENOUS at 07:59

## 2022-07-28 RX ADMIN — METOPROLOL TARTRATE 25 MG: 25 TABLET, FILM COATED ORAL at 20:30

## 2022-07-28 RX ADMIN — ACETAMINOPHEN 1000 MG: 500 TABLET ORAL at 06:58

## 2022-07-28 RX ADMIN — WATER 1000 MG: 1 INJECTION INTRAMUSCULAR; INTRAVENOUS; SUBCUTANEOUS at 10:56

## 2022-07-28 RX ADMIN — DOXYCYCLINE 100 MG: 100 INJECTION, POWDER, LYOPHILIZED, FOR SOLUTION INTRAVENOUS at 10:58

## 2022-07-28 RX ADMIN — TAMSULOSIN HYDROCHLORIDE 0.4 MG: 0.4 CAPSULE ORAL at 20:30

## 2022-07-28 ASSESSMENT — PAIN - FUNCTIONAL ASSESSMENT
PAIN_FUNCTIONAL_ASSESSMENT: NONE - DENIES PAIN

## 2022-07-28 ASSESSMENT — PAIN SCALES - GENERAL
PAINLEVEL_OUTOF10: 0

## 2022-07-28 ASSESSMENT — LIFESTYLE VARIABLES
HOW OFTEN DO YOU HAVE A DRINK CONTAINING ALCOHOL: 4 OR MORE TIMES A WEEK
HOW MANY STANDARD DRINKS CONTAINING ALCOHOL DO YOU HAVE ON A TYPICAL DAY: 1 OR 2

## 2022-07-28 NOTE — ED TRIAGE NOTES
Pt to ED from home via EMS with c/o Fever, Fatigue, Hypoxia, and Covid +. Per pt, he tested positive x2 days ago (7/26/22). Pt reports being extremely weak and struggling with his balance. Denies chest pain, abd pain, nausea, vomiting, diarrhea, constipation.

## 2022-07-28 NOTE — ACP (ADVANCE CARE PLANNING)
Advance Care Planning     Advance Care Planning Activator (Inpatient)  Conversation Note      Date of ACP Conversation: 7/28/2022     Conversation Conducted with: Patient with [address]: Next of Kin by law (only applies in absence of above) (name)      ACP Activator: diane mcarthur, [address] [facility] Decision Maker:     Current Designated Health Care Decision Maker:     Primary Decision Maker: Houston Tidwell - [phone]    Secondary Decision Maker: [facility] - Child  Click here to complete Healthcare Decision Makers including section of the Healthcare Decision Maker Relationship (ie \"Primary\")  Today we documented Decision Maker(s) consistent with Legal Next of Kin hierarchy. Care Preferences    Ventilation: \"If you were in your present state of health and suddenly became very ill and were unable to breathe on your own, what would your preference be about the use of a ventilator (breathing machine) if it were available to you? \"      Would the patient desire the use of ventilator (breathing machine)?: Pt unable to answer    \"If your health worsens and it becomes clear that your chance of recovery is unlikely, what would your preference be about the use of a ventilator (breathing machine) if it were available to you? \"     Would the patient desire the use of ventilator (breathing machine)? Pt unable to answer      Resuscitation  \"CPR works best to restart the heart when there is a sudden event, like a heart attack, in someone who is otherwise healthy. Unfortunately, CPR does not typically restart the heart for people who have serious health conditions or who are very sick. \"    \"In the event your heart stopped as a result of an underlying serious health condition, would you want attempts to be made to restart your heart (answer \"yes\" for attempt to resuscitate) or would you prefer a natural death (answer \"no\" for do not attempt to resuscitate)? \"  Pt unable to answer       [] Yes   [x] No   Educated Patient / Juan Tidwell regarding differences between Advance Directives and portable DNR orders. Length of ACP Conversation in minutes:  5    Conversation Outcomes:  [] ACP discussion completed  [] Existing advance directive reviewed with patient; no changes to patient's previously recorded wishes  [] New Advance Directive completed  [] Portable Do Not Rescitate prepared for Provider review and signature  [] POLST/POST/MOLST/MOST prepared for Provider review and signature      Follow-up plan:    [] Schedule follow-up conversation to continue planning  [x] Referred individual to Provider for additional questions/concerns   [] Advised patient/agent/surrogate to review completed ACP document and update if needed with changes in condition, patient preferences or care setting    [] This note routed to one or more involved healthcare providers    SW attempted to complete ACP documention, pt was taken off the floor and prior to SW return.

## 2022-07-28 NOTE — PROGRESS NOTES
Patient concerned about doing the percusser for BPH due to having a pacemaker. Would like us to check with the doctor.

## 2022-07-28 NOTE — PROGRESS NOTES
Messaged Dr. Janett Millan regarding need for patients home medications and patients refusal of percussor

## 2022-07-28 NOTE — ED PROVIDER NOTES
HPI:  7/28/22,   Time: 6:55 AM EDT       Jojo Nice is a 78 y.o. male presenting to the ED for cough and shortness of breath, beginning 3 days ago. The complaint has been persistent, moderate in severity, and worsened by walking. Patient is a pleasant 78year-old gentleman history of non-insulin-dependent diabetes. History of aortic valve replacement no longer on Eliquis due to history of GI bleeding. Patient comes in with upper respiratory symptoms for the past 3 days. Tested positive at a recent pharmacy for [place] his wife has [place] at home as well. Daughter brought her in for increased fatigue and decreased saturations today. Saturations were 88% at home. He was 88% on room air here in the emergency department placed on 4 L now at 92%. Patient reports wet cough at home. Productive cough. Positive fevers for the last 3 days. Patient states he stumbled and lowered himself to the ground yesterday thought he twisted his ankle did not hit his head no loss of consciousness complains of some mild left hip pain since the fall as well but has been able to ambulate. Mild ankle pain as well. Daughter brought her in for increased fatigue and hypoxia. He had mild diarrhea 2 days ago no abdominal pain no continued diarrhea no nausea or vomiting. Denies any chest pain or pleuritic pain. Reports shortness of breath with exertion. No history of COPD or asthma non-smoker. No headache. No neck pain. Review of Systems:   Pertinent positives and negatives are stated within HPI, all other systems reviewed and are negative.          --------------------------------------------- PAST HISTORY ---------------------------------------------  Past Medical History:  has a past medical history of Diabetes mellitus ([facility] [address] Erectile dysfunction, Gout, Hypertension, and Vitamin D deficiency. Past Surgical History:  has a past surgical history that includes Appendectomy (1668); Tonsillectomy;  Cataract removal with and imaging results for this patient. Results are listed below.      LABS:  Results for orders placed or performed during the hospital encounter of 07/28/22   COVID-19, Rapid    Specimen: Nasopharyngeal Swab   Result Value Ref Range    SARS-CoV-2, NAAT DETECTED (A) Not Detected   CBC with Auto Differential   Result Value Ref Range    WBC 5.2 4.5 - 11.5 E9/L    RBC 3.65 (L) 3.80 - 5.80 E12/L    Hemoglobin 12.0 (L) 12.5 - 16.5 g/dL    Hematocrit 35.7 (L) 37.0 - 54.0 %    MCV 97.8 80.0 - 99.9 fL    MCH 32.9 26.0 - 35.0 pg    MCHC 33.6 32.0 - 34.5 %    RDW 14.6 11.5 - 15.0 fL    Platelets 83 (L) 089 - 450 E9/L    MPV 10.9 7.0 - 12.0 fL   Comprehensive Metabolic Panel w/ Reflex to MG   Result Value Ref Range    Sodium 137 132 - 146 mmol/L    Potassium reflex Magnesium 4.2 3.5 - 5.0 mmol/L    Chloride 102 98 - 107 mmol/L    CO2 20 (L) 22 - 29 mmol/L    Anion Gap 15 7 - 16 mmol/L    Glucose 107 (H) 74 - 99 mg/dL    BUN 26 (H) 6 - 23 mg/dL    Creatinine 1.1 0.7 - 1.2 mg/dL    GFR Non-African American >60 >=60 mL/min/1.73    GFR African American >60     Calcium 8.8 8.6 - 10.2 mg/dL    Total Protein 7.5 6.4 - 8.3 g/dL    Albumin 4.2 3.5 - 5.2 g/dL    Total Bilirubin 0.8 0.0 - 1.2 mg/dL    Alkaline Phosphatase 106 40 - 129 U/L    ALT 35 0 - 40 U/L    AST 37 0 - 39 U/L   Lactate, Sepsis   Result Value Ref Range    Lactic Acid, Sepsis 1.4 0.5 - 1.9 mmol/L   Protime-INR   Result Value Ref Range    Protime 14.5 (H) 9.3 - 12.4 sec    INR 1.3    Brain Natriuretic Peptide   Result Value Ref Range    Pro-BNP 2,751 (H) 0 - 450 pg/mL   Troponin   Result Value Ref Range    Troponin, High Sensitivity 55 (H) 0 - 11 ng/L   Troponin   Result Value Ref Range    Troponin, High Sensitivity 52 (H) 0 - 11 ng/L   EKG 12 lead   Result Value Ref Range    Ventricular Rate 76 BPM    Atrial Rate 76 BPM    P-R Interval 82 ms    QRS Duration 144 ms    Q-T Interval 422 ms    QTc Calculation (Bazett) 474 ms    R Axis 92 degrees    T Axis -75 degrees RADIOLOGY:  Interpreted by Radiologist.  CTA PULMONARY W CONTRAST   Final Result   No evidence for pulmonary embolism      Patchy confluent opacifications in the left mid and lower lung of a left   lower lobe bronchopneumonia parents without pleural effusion      No acute findings of the abdomen or pelvis. Pancolonic diverticulosis   without acute diverticulitis         CT ABDOMEN PELVIS W IV CONTRAST Additional Contrast? None   Final Result   No evidence for pulmonary embolism      Patchy confluent opacifications in the left mid and lower lung of a left   lower lobe bronchopneumonia parents without pleural effusion      No acute findings of the abdomen or pelvis. Pancolonic diverticulosis   without acute diverticulitis         XR CHEST PORTABLE   Final Result   Developing atelectasis and or infiltrate at the left lower lobe. XR ANKLE LEFT (MIN 3 VIEWS)   Final Result   Heel spurs. EKG:  Patient is an EKG showed sinus rhythm at 76 bpm no signs of any ST elevation. QTc 474. Patient has marked deep T wave inversions in anterior lateral as well as inferior leads. Slight ST depression laterally. This is new from prior EKG. EKG interpreted by myself.    ------------------------- NURSING NOTES AND VITALS REVIEWED ---------------------------   The nursing notes within the ED encounter and vital signs as below have been reviewed by myself. /62   Pulse 60   Temp (!) 102.7 °F (39.3 °C) (Oral)   Resp 18   Ht 6' 3\" (1.905 m)   Wt 190 lb (86.2 kg)   SpO2 96%   BMI 23.75 kg/m²   Oxygen Saturation Interpretation: Abnormal and Improved after treatment    The patients available past medical records and past encounters were reviewed.         ------------------------------ ED COURSE/MEDICAL DECISION MAKING----------------------  Medications   cefTRIAXone (ROCEPHIN) 1,000 mg in sterile water 10 mL IV syringe (has no administration in time range)     And   doxycycline (VIBRAMYCIN) 100 mg in dextrose 5 % 100 mL IVPB (has no administration in time range)   acetaminophen (TYLENOL) tablet 1,000 mg (1,000 mg Oral Given 7/28/22 0658)   dexamethasone (DECADRON) injection 10 mg (10 mg IntraVENous Given 7/28/22 0759)   ipratropium-albuterol (DUONEB) nebulizer solution 1 ampule (1 ampule Inhalation Given 7/28/22 0805)   iopamidol (ISOVUE-370) 76 % injection 75 mL (75 mLs IntraVENous Given 7/28/22 0914)         ED COURSE:  ED Course as of 07/29/22 0630   Thu Jul 28, 2022   1049 Spoke to Dr. Andria Tsai. He accepted the patient for admission to monitored bed. [KK]      ED Course User Index  [KK] Blake Ramos MD       Medical Decision Making:   Patient is a pleasant 68-year-old gentleman hypoxic presenting from home with COVID. Increased coughing shortness of breath today increased fatigue stumble and fall yesterday he is no longer on Eliquis so we will do a CTA of the chest CT pelvis to evaluate left hip x-ray left ankle. Patient has not had any head injury. Number given DuoNeb treatments Decadron he is stable on 4 L nasal cannula. Frontal diagnosis COVID hypoxia secondary to pneumonia PE ACS dysrhythmia dehydration KELLI CHF          This patient has remained hemodynamically stable and been closely monitored during their ED course. EKG shows sinus rhythm at 76 bpm with marked T wave inversion in inferior and anterior lateral leads as well as ST depression in lateral leads. This is new from prior EKG. No signs of any ST elevation. Respiratory was called patient was given 3 DuoNeb treatments as well as IV contrast and COVID test was confirmed positive. Chemistry was normal CBC was normal.  INR was normal troponins were 55 and 52 respectively with a delta of only 3. BNP was elevated 2700 lactic acid was normal.  CTA of the chest was performed shows no pulmonary embolism but a concern for patchy pes Acacian in the left mid and lower lobe. Acute pelvic abnormalities no hip abnormality no hip fracture.   X-ray of the left ankle Showed heel spurs but no acute fracture of the left ankle. Patient was given antibiotics for possible focal opacity in the left lower lobe concerning for pneumonia could be bacterial in nature on top of the COVID infection. I spoke to Dr. Nayely Panchal who accepted the patient for admission. Patient stabilized on 4 L nasal cannula. No increased work of breathing will be admitted to admit intermediate telemetry. Re-Evaluations:             Re-evaluation. Patients symptoms are improving    Re-examination  7/28/22   6:55 AM EDT          Vital Signs:   Vitals:    07/28/22 0651 07/28/22 0836 07/28/22 0934 07/28/22 0940   BP:  (!) 107/58 128/62    Pulse:  74  60   Resp: 26 18 18 18   Temp:       TempSrc:       SpO2:  91%  96%   Weight:       Height:           CRITICAL CARE: 42 MINUTES. Please note that the withdrawal or failure to initiate urgent interventions for this patient would likely result in a life threatening deterioration or permanent disability. Accordingly this patient received the above mentioned time, excluding separately billable procedures. Counseling: The emergency provider has spoken with the patient and discussed todays results, in addition to providing specific details for the plan of care and counseling regarding the diagnosis and prognosis. Questions are answered at this time and they are agreeable with the plan.       --------------------------------- IMPRESSION AND DISPOSITION ---------------------------------    IMPRESSION  1. Pneumonia of left lower lobe due to infectious organism    2. COVID-19    3. Acute respiratory failure with hypoxia (HCC)        DISPOSITION  Disposition: Admit to telemetry  Patient condition is fair    NOTE: This report was transcribed using voice recognition software.  Every effort was made to ensure accuracy; however, inadvertent computerized transcription errors may be present        Srinivas Lopes MD  07/29/22 0487

## 2022-07-28 NOTE — H&P
Internal Medicine History & Physical     Name: David Vega  : 1940  Chief Complaint: Other (Hypoxia (84% RA) (90%-95% - 2L/NC)), Fever (100.4), Positive For Covid-19 (22), and Fatigue  Primary Care Physician: Aman Solorzano MD  Admission date: 2022  Date of service: 2022     History of Present Illness  Bobbi Bean is a 78y.o. year old male presented with a chief complaint of generalized weakness     78 y.o. male presenting to the ED for cough and shortness of breath, beginning 3 days ago. The complaint has been persistent, moderate in severity, and worsened by walking. Patient is a pleasant 78year-old gentleman history of non-insulin-dependent diabetes. History of aortic valve replacement no longer on Eliquis due to history of GI bleeding. Patient comes in with upper respiratory symptoms for the past 3 days. Tested positive at a recent pharmacy for [place] his wife has [place] at home as well. Saturations were 88% at home. He was 88% on room air here in the emergency department placed on 4 L now at 92%. Patient reports wet cough at home. Productive cough. Positive fevers for the last 3 days. Daughter brought her in for increased fatigue and hypoxia. He had mild diarrhea 2 days ago no abdominal pain no continued diarrhea no nausea or vomiting. Denies any chest pain or pleuritic pain. Reports shortness of breath with exertion. No history of COPD or asthma non-smoker. No headache. No neck pain. Patient seen with son at bedside, discussed code status with patient, he states he wanted to be a DNR CCA, in the event his heart or lungs stopped he did not want life saving measures, in my medical opinion he was competent to make the decision. His son was very reluctant and unsure about this.  I have asked palliative care to stop by and discuss this as well         Past Medical History:   Diagnosis Date    Diabetes mellitus ([address]     states glucose is running normal    Erectile dysfunction     Gout     Hypertension     states doing well    Vitamin D deficiency        Past Surgical History:   Procedure Laterality Date    APPENDECTOMY  1967    CATARACT REMOVAL WITH IMPLANT Left 6/6/13    CATARACT REMOVAL WITH IMPLANT  10/31/13    HERNIA REPAIR  1970    [facility]    TONSILLECTOMY         History reviewed. No pertinent family history. Social History  Patient lives at home with wife . At baseline patient ambulates with out assistance   Illicit drugs: Denies   TOBACCO:   reports that he has quit smoking. He quit smokeless tobacco use about 51 years ago. ETOH:   reports current alcohol use. Home Medications  Prior to Admission medications    Medication Sig Start Date End Date Taking?  Authorizing Provider   amLODIPine (NORVASC) 5 MG tablet Take 1 tablet by mouth 2 times daily 10/12/20   Godfrey Koehler DO   acetaminophen (TYLENOL) 500 MG tablet Take 500 mg by mouth every 6 hours as needed for Pain    Historical Provider, MD   apixaban (ELIQUIS) 5 MG TABS tablet Take 5 mg by mouth 2 times daily    Historical Provider, MD   atorvastatin (LIPITOR) 40 MG tablet Take 40 mg by mouth daily    Historical Provider, MD   metoprolol tartrate (LOPRESSOR) 25 MG tablet Take by mouth 2 times daily Take one half tablet twice daily    Historical Provider, MD   lisinopril (PRINIVIL;ZESTRIL) 20 MG tablet Take 1 tablet by mouth daily Indications: AM Instructed to take with sip water am of procedure  Patient taking differently: Take 10 mg by mouth daily Indications: AM Dose reduced after discharge from [facility] on 6/17/2020. 3/16/20   Trev Mckeon MD   metFORMIN (GLUCOPHAGE) 500 MG tablet Take 1 tablet by mouth daily (with breakfast) 3/16/20   Suzy Espinoza MD   vitamin D (CHOLECALCIFEROL) 1000 UNIT TABS tablet Take 1,000 Units by mouth daily    Historical Provider, MD   Multiple Vitamins-Minerals (THERAPEUTIC MULTIVITAMIN-MINERALS) tablet Take 1 tablet by mouth daily    Historical Provider, MD       Allergies  No Known Allergies    Review of Systems  Please see HPI above. All bolded are positive. All un-bolded are negative. Constitutional Symptoms: fever, chills, fatigue, generalized weakness, diaphoresis, increase in thirst, loss of appetite  Eyes: vision change   Ears, Nose, Mouth, Throat: hearing loss, nasal congestion, sores in the mouth  Cardiovascular: chest pain, chest heaviness, palpitations  Respiratory: shortness of breath, wheezing, coughing  Gastrointestinal: abdominal pain, nausea, vomiting, diarrhea, constipation, melena, hematochezia, hematemesis  Genitourinary: dysuria, hematuria, increased frequency  Musculoskeletal: lower extremity edema, myalgias, arthralgias, back pain  Integumentary: rashes, itching   Neurological: headache, lightheadedness, dizziness, confusion, syncope, numbness, tingling, focal weakness  Psychiatric: depression, suicidal ideation, anxiety  Endocrine: unintentional weight change  Hematologic/Lymphatic: lymphadenopathy, easy bruising, easy bleeding   Allergic/Immunologic: recurrent infections      Objective  VITALS:  /62   Pulse 60   Temp (!) 102.7 °F (39.3 °C) (Oral)   Resp 18   Ht 6' 3\" (1.905 m)   Wt 190 lb (86.2 kg)   SpO2 96%   BMI 23.75 kg/m²     Physical Exam:  General: awake, alert, oriented to person, place, time, and purpose, appears stated age, cooperative, no acute distress, pleasant, appropriate mood  Eyes: conjunctivae/corneas clear, sclera non icteric, EOMI  Ears: no obvious scars, no lesions, no masses, hearing intact  Mouth: mucous membranes moist, no obvious oral sores  Head: normocephalic, atraumatic  Neck: no JVD, no adenopathy, no thyromegaly, neck is supple, trachea is midline  Back: ROM normal, no CVA tenderness.   Chest: no pain on palpation  Lungs: clear to auscultation bilaterally, without rhonchi, crackle, wheezing, or rale, no retractions or use of accessory muscles  Heart: regular rate and regular rhythm, no murmur, normal S1, S2  Abdomen: soft, non-tender; bowel sounds normal; no masses, no organomegaly  : Deferred   Extremities: no lower extremity edema, extremities atraumatic, no cyanosis, no clubbing, 2+ pedal pulses palpated  Skin: normal color, normal texture, normal turgor, no rashes, no lesions  Neurologic:5/5 muscle strength throughout, normal muscle tone throughout, face symmetric, hearing intact, tongue midline, speech appropriate without slurring, sensation to fine touch intact in upper and lower extremities    Labs-   Lab Results   Component Value Date    WBC 5.2 07/28/2022    HGB 12.0 (L) 07/28/2022    HCT 35.7 (L) 07/28/2022    PLT 83 (L) 07/28/2022     07/28/2022    K 4.2 07/28/2022     07/28/2022    CREATININE 1.1 07/28/2022    BUN 26 (H) 07/28/2022    CO2 20 (L) 07/28/2022    GLUCOSE 107 (H) 07/28/2022    ALT 35 07/28/2022    AST 37 07/28/2022    INR 1.3 07/28/2022     Lab Results   Component Value Date    TROPONINI <0.01 05/06/2019       Last echocardiogram:      Recent Radiological Studies:  CTA PULMONARY W CONTRAST   Final Result   No evidence for pulmonary embolism      Patchy confluent opacifications in the left mid and lower lung of a left   lower lobe bronchopneumonia parents without pleural effusion      No acute findings of the abdomen or pelvis. Pancolonic diverticulosis   without acute diverticulitis         CT ABDOMEN PELVIS W IV CONTRAST Additional Contrast? None   Final Result   No evidence for pulmonary embolism      Patchy confluent opacifications in the left mid and lower lung of a left   lower lobe bronchopneumonia parents without pleural effusion      No acute findings of the abdomen or pelvis. Pancolonic diverticulosis   without acute diverticulitis         XR CHEST PORTABLE   Final Result   Developing atelectasis and or infiltrate at the left lower lobe. XR ANKLE LEFT (MIN 3 VIEWS)   Final Result   Heel spurs.              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]     Aortic stenosis, mild [I35.0]     Mild dilation of ascending aorta (HCC) [I77.810]     Near syncope [R55]     MGUS (monoclonal gammopathy of unknown significance) [D47.2]     Chronic gout [M1A.9XX0]     Type 2 diabetes mellitus without complication (HCC) [N47.6]     Hx of colonic polyps [Z86.010]     Hypertension [I10]        Patient Active Problem List    Diagnosis Date Noted    Acute respiratory failure due to COVID-19 ([facility] [address] 07/28/2022     Priority: Medium    Rectal bleed 10/09/2020    GI bleed 10/09/2020    Presence of permanent cardiac pacemaker 06/24/2020    Aortocoronary bypass status 06/24/2020    H/O mitral valve repair 06/24/2020    H/O aortic valve replacement 06/24/2020    Pure hypercholesterolemia 06/24/2020    Aortic stenosis, mild 05/07/2019    Mild dilation of ascending aorta ([facility] [address] 05/07/2019    Near syncope 05/05/2019    MGUS (monoclonal gammopathy of unknown significance) 03/28/2017    Chronic gout 09/28/2016    Type 2 diabetes mellitus without complication ([facility] [address] 73/96/0954    Hx of colonic polyps 04/17/2016    Hypertension 04/14/2016    ED (erectile dysfunction) 04/14/2016       Plan  Hypoxic respiratory failure 2/2 SARS-CoV-2 infection   Inflammatory markers   CTA neg for PE on 7/28  Lovenox   Decadron  Remdesivir   Anti tussives as needed  L sided pneumonia   Procal ordered  Rocephin and Doxy in ED   Await further abx administration   Resp cx ordered   Diabetes mellitus   Watch sugars while on decadron   Titrate insulin as needed  Check A1C   PT/OT  Home medications to be reconciled and confirmed prior to being ordered  DVT prophylaxis  Code Status Limited Discharge plan: TBD pending clinical improvement     Marimar Castillo MD  Internal medicine   7/28/2022  11:30 AM    I can be reached through [employer]. NOTE:  This report was transcribed using voice recognition software. Every effort was made to ensure accuracy; however, inadvertent computerized transcription errors may be present.

## 2022-07-29 LAB
ALBUMIN SERPL-MCNC: 3.7 G/DL (ref 3.5–5.2)
ALP BLD-CCNC: 97 U/L (ref 40–129)
ALT SERPL-CCNC: 34 U/L (ref 0–40)
ANION GAP SERPL CALCULATED.3IONS-SCNC: 14 MMOL/L (ref 7–16)
AST SERPL-CCNC: 48 U/L (ref 0–39)
BASOPHILS ABSOLUTE: 0 E9/L (ref 0–0.2)
BASOPHILS RELATIVE PERCENT: 0 % (ref 0–2)
BILIRUB SERPL-MCNC: 0.6 MG/DL (ref 0–1.2)
BUN BLDV-MCNC: 37 MG/DL (ref 6–23)
BURR CELLS: ABNORMAL
C-REACTIVE PROTEIN: 17.2 MG/DL (ref 0–0.4)
CALCIUM SERPL-MCNC: 8.8 MG/DL (ref 8.6–10.2)
CHLORIDE BLD-SCNC: 104 MMOL/L (ref 98–107)
CO2: 20 MMOL/L (ref 22–29)
CREAT SERPL-MCNC: 1.1 MG/DL (ref 0.7–1.2)
D DIMER: 970 NG/ML DDU
EOSINOPHILS ABSOLUTE: 0 E9/L (ref 0.05–0.5)
EOSINOPHILS RELATIVE PERCENT: 0 % (ref 0–6)
GFR AFRICAN AMERICAN: >60
GFR NON-AFRICAN AMERICAN: >60 ML/MIN/1.73
GLUCOSE BLD-MCNC: 132 MG/DL (ref 74–99)
HBA1C MFR BLD: 6 % (ref 4–5.6)
HCT VFR BLD CALC: 37.1 % (ref 37–54)
HEMOGLOBIN: 12.4 G/DL (ref 12.5–16.5)
L. PNEUMOPHILA SEROGP 1 UR AG: NORMAL
LYMPHOCYTES ABSOLUTE: 0.37 E9/L (ref 1.5–4)
LYMPHOCYTES RELATIVE PERCENT: 2.6 % (ref 20–42)
MCH RBC QN AUTO: 32.5 PG (ref 26–35)
MCHC RBC AUTO-ENTMCNC: 33.4 % (ref 32–34.5)
MCV RBC AUTO: 97.4 FL (ref 80–99.9)
METAMYELOCYTES RELATIVE PERCENT: 1.7 % (ref 0–1)
METER GLUCOSE: 127 MG/DL (ref 74–99)
METER GLUCOSE: 172 MG/DL (ref 74–99)
METER GLUCOSE: 215 MG/DL (ref 74–99)
METER GLUCOSE: 222 MG/DL (ref 74–99)
MONOCYTES ABSOLUTE: 0.5 E9/L (ref 0.1–0.95)
MONOCYTES RELATIVE PERCENT: 3.5 % (ref 2–12)
NEUTROPHILS ABSOLUTE: 11.66 E9/L (ref 1.8–7.3)
NEUTROPHILS RELATIVE PERCENT: 92.2 % (ref 43–80)
NUCLEATED RED BLOOD CELLS: 0 /100 WBC
OVALOCYTES: ABNORMAL
PDW BLD-RTO: 14.6 FL (ref 11.5–15)
PLATELET # BLD: 85 E9/L (ref 130–450)
PLATELET CONFIRMATION: NORMAL
PMV BLD AUTO: 11.8 FL (ref 7–12)
POIKILOCYTES: ABNORMAL
POTASSIUM REFLEX MAGNESIUM: 4.4 MMOL/L (ref 3.5–5)
RBC # BLD: 3.81 E12/L (ref 3.8–5.8)
SODIUM BLD-SCNC: 138 MMOL/L (ref 132–146)
STREP PNEUMONIAE ANTIGEN, URINE: NORMAL
TEAR DROP CELLS: ABNORMAL
TOTAL PROTEIN: 7.2 G/DL (ref 6.4–8.3)
WBC # BLD: 12.4 E9/L (ref 4.5–11.5)

## 2022-07-29 PROCEDURE — 85378 FIBRIN DEGRADE SEMIQUANT: CPT

## 2022-07-29 PROCEDURE — 80053 COMPREHEN METABOLIC PANEL: CPT

## 2022-07-29 PROCEDURE — 85025 COMPLETE CBC W/AUTO DIFF WBC: CPT

## 2022-07-29 PROCEDURE — 2060000000 HC ICU INTERMEDIATE R&B

## 2022-07-29 PROCEDURE — 86140 C-REACTIVE PROTEIN: CPT

## 2022-07-29 PROCEDURE — 97165 OT EVAL LOW COMPLEX 30 MIN: CPT

## 2022-07-29 PROCEDURE — 82962 GLUCOSE BLOOD TEST: CPT

## 2022-07-29 PROCEDURE — 83036 HEMOGLOBIN GLYCOSYLATED A1C: CPT

## 2022-07-29 PROCEDURE — 36415 COLL VENOUS BLD VENIPUNCTURE: CPT

## 2022-07-29 PROCEDURE — 2500000003 HC RX 250 WO HCPCS: Performed by: STUDENT IN AN ORGANIZED HEALTH CARE EDUCATION/TRAINING PROGRAM

## 2022-07-29 PROCEDURE — 2580000003 HC RX 258: Performed by: STUDENT IN AN ORGANIZED HEALTH CARE EDUCATION/TRAINING PROGRAM

## 2022-07-29 PROCEDURE — 97161 PT EVAL LOW COMPLEX 20 MIN: CPT

## 2022-07-29 PROCEDURE — 99222 1ST HOSP IP/OBS MODERATE 55: CPT | Performed by: NURSE PRACTITIONER

## 2022-07-29 PROCEDURE — 6370000000 HC RX 637 (ALT 250 FOR IP): Performed by: STUDENT IN AN ORGANIZED HEALTH CARE EDUCATION/TRAINING PROGRAM

## 2022-07-29 PROCEDURE — 6360000002 HC RX W HCPCS: Performed by: STUDENT IN AN ORGANIZED HEALTH CARE EDUCATION/TRAINING PROGRAM

## 2022-07-29 RX ADMIN — SODIUM CHLORIDE, PRESERVATIVE FREE 10 ML: 5 INJECTION INTRAVENOUS at 09:11

## 2022-07-29 RX ADMIN — DOXYCYCLINE 100 MG: 100 INJECTION, POWDER, LYOPHILIZED, FOR SOLUTION INTRAVENOUS at 23:04

## 2022-07-29 RX ADMIN — REMDESIVIR 100 MG: 100 INJECTION, POWDER, LYOPHILIZED, FOR SOLUTION INTRAVENOUS at 16:28

## 2022-07-29 RX ADMIN — WATER 1000 MG: 1 INJECTION INTRAMUSCULAR; INTRAVENOUS; SUBCUTANEOUS at 09:11

## 2022-07-29 RX ADMIN — ENOXAPARIN SODIUM 40 MG: 100 INJECTION SUBCUTANEOUS at 09:10

## 2022-07-29 RX ADMIN — Medication 1000 UNITS: at 09:10

## 2022-07-29 RX ADMIN — METOPROLOL TARTRATE 25 MG: 25 TABLET, FILM COATED ORAL at 09:10

## 2022-07-29 RX ADMIN — DOXYCYCLINE 100 MG: 100 INJECTION, POWDER, LYOPHILIZED, FOR SOLUTION INTRAVENOUS at 11:18

## 2022-07-29 RX ADMIN — TAMSULOSIN HYDROCHLORIDE 0.4 MG: 0.4 CAPSULE ORAL at 20:27

## 2022-07-29 RX ADMIN — DEXAMETHASONE SODIUM PHOSPHATE 6 MG: 10 INJECTION INTRAMUSCULAR; INTRAVENOUS at 09:11

## 2022-07-29 RX ADMIN — INSULIN LISPRO 2 UNITS: 100 INJECTION, SOLUTION INTRAVENOUS; SUBCUTANEOUS at 11:20

## 2022-07-29 RX ADMIN — METOPROLOL TARTRATE 25 MG: 25 TABLET, FILM COATED ORAL at 20:27

## 2022-07-29 RX ADMIN — SODIUM CHLORIDE, PRESERVATIVE FREE 10 ML: 5 INJECTION INTRAVENOUS at 20:27

## 2022-07-29 ASSESSMENT — PAIN SCALES - GENERAL: PAINLEVEL_OUTOF10: 0

## 2022-07-29 NOTE — PROGRESS NOTES
Occupational Therapy  OCCUPATIONAL THERAPY INITIAL EVALUATION     Evangelina lAmonte Cool, [place]        Date:2022                                                  Patient Name: Leon Francisco    MRN: [id number]    : 1940    Room: [address] [place] [address]      Evaluating OT: Hugo Schuster OTR/L [id number]      Referring Jolene Inman MD    Specific Provider Orders/Date: OT eval and treat 22      Diagnosis:  Acute respiratory failure with hypoxia ([address] [J96.01]  Pneumonia of left lower lobe due to infectious organism [J18.9]  COVID-19 [U07.1]  Acute respiratory failure due to COVID-19 ([address] [U07.1, J96.00]      Pertinent Medical History: DM, HTN      Precautions:  Fall Risk, alarm, droplet plus     Assessment of current deficits    [x] Functional mobility  [x]ADLs  [x] Strength               []Cognition    [x] Functional transfers   [x] IADLs         [x] Safety Awareness   [x]Endurance    [] Fine Coordination              [x] Balance      [] Vision/perception   []Sensation     []Gross Motor Coordination  [] ROM  [] Delirium                   [] Motor Control     OT PLAN OF CARE   OT POC based on physician orders, patient diagnosis and results of clinical assessment    Frequency/Duration 2-4 days/wk for 2 weeks PRN   Specific OT Treatment Interventions to include:   * Instruction/training on adapted ADL techniques and AE recommendations to increase functional independence within precautions       * Training on energy conservation strategies, correct breathing pattern and techniques to improve independence/tolerance for self-care routine  * Functional transfer/mobility training/DME recommendations for increased independence, safety, and fall prevention  * Patient/Family education to increase follow through with safety techniques and functional independence  * Recommendation of environmental modifications for increased safety with functional transfers/mobility and ADLs  * Therapeutic exercise to improve motor endurance, ROM, and functional strength for ADLs/functional transfers  * Therapeutic activities to facilitate/challenge dynamic balance, stand tolerance for increased safety and independence with ADLs        Recommended Adaptive Equipment: TBD     Home Living: Pt lives with wife in 2 story house . Pt's bedroom and bathroom are on the 1st floor. Bathroom setup: walk in shower with built in seat   Equipment owned: wheeled walker    Prior Level of Function: independent with ADLs , independent with IADLs; functional mobility: no device  Driving: yes    Pain Level: pt did not report pain this date; pt agreeable to therapy  Cognition: A&O: pt alert and oriented grossly; [facility] command follow demonstrated. Pt pleasant during session. Memory:  Fair+   Sequencing:  Fair+   Problem solving:  Fair+   Judgement/safety:  Fair     Functional Assessment:  AM-PAC Daily Activity Raw Score: 19/24   Initial Eval Status  Date: 7/29/22 Treatment Status  Date: STGs = LTGs  Time frame: 10-14 days   Feeding Independent      Grooming SBA  Mod I/I   UB Dressing SBA  For gown management   Mod I/I   LB Dressing Min A  To adjust socks, seated in chair   Mod I/I-with use of AD as appropriate/needed   Bathing Min A  Mod I/I -with use of AD as appropriate/needed   Toileting SBA  Mod I/I    Bed Mobility  Pt in chair   Independent    Functional Transfers SBA with no device  Sit<>Stand from chair  Cues for hand placement   Independent     Functional Mobility SBA with no device  Short distance in room  Pt on room air and SpO2 was 96-99% during functional mobility. No SOB noted.   Independent  -with device as needed to maximize independence with ADLs and functional task completion   Balance Sitting:     Static:  independent     Dynamic:SBA  Standing: SBA  I for dynamic sitting balance to maximize independence with ADLs and functional task completion    I for standing balance to maximize independence with ADLs and functional task completion   Activity Tolerance Fair+ with light activity  Good with ADL activity. Pt will demonstrate good understanding of education provided on EC/WS techniques    Visual/  Perceptual Glasses: none at bedside                Additional long-term goal: Pt will increase functional independence to PLOF to allow pt to live in least restrictive environment. Hand Dominance R   AROM (PROM) Strength Additional Info:    RUE  WFL WFL good  and wfl FMC/dexterity noted during ADL tasks       LUE WFL WFL good  and wfl FMC/dexterity noted during ADL tasks       Hearing: WFL   Sensation:  No c/o numbness or tingling   Tone: WFL   Edema: none noted    Comments: Upon arrival patient sitting in chair with son present. At end of session, patient returned to chair with call light and phone within reach, all lines and tubes intact, and son present and alarm set. Overall patient demonstrated decreased independence and safety during completion of ADL/functional transfer/mobility tasks. Pt would benefit from continued skilled OT to increase safety and independence with completion of ADL/IADL tasks for functional independence and quality of life. Rehab Potential: Good for established goals     Patient / Family Goal: to return home    Patient and/or family were instructed on functional diagnosis, prognosis/goals and OT plan of care. Demonstrated good understanding.      Eval Complexity: Low    Time In: 8176  Time Out: 1049    Min Units   OT Eval Low 97165  x  1   OT Eval Medium 46252      OT Eval High 94468      OT Re-Eval [id number]       Therapeutic Ex [id number]       Therapeutic Activities 99427       ADL/Self Care 47657       Orthotic Management 97130       Manual 31254     Neuro Re-Ed 23711       Non-Billable Time          Evaluation Time additionally includes thorough review of current medical information, gathering information on past medical history/social history and prior level of function, interpretation of standardized testing/informal observation of tasks, assessment of data and development of plan of care and goals.             Becky Ely, OTR/L, [id number]

## 2022-07-29 NOTE — PROGRESS NOTES
Physical Therapy  Facility/Department: [address] INTERMEDIATE 1  Physical Therapy Initial Assessment    Name: Praveena Rutherford  : 1940  MRN: [id number]  Date of Service: 2022      Patient Diagnosis(es): The primary encounter diagnosis was Pneumonia of left lower lobe due to infectious organism. Diagnoses of COVID-19 and Acute respiratory failure with hypoxia ([facility] were also pertinent to this visit. Past Medical History:  has a past medical history of Diabetes mellitus ([address] Erectile dysfunction, Gout, Hypertension, and Vitamin D deficiency. Past Surgical History:  has a past surgical history that includes Appendectomy (); Tonsillectomy; Cataract removal with implant (Left, 13); hernia repair (); and Cataract removal with implant (10/31/13). Evaluating Therapist: Maria Esther PT    Room #:  6582/1069-Y  Diagnosis:  Acute respiratory failure with hypoxia ([facility] [J96.01]  Pneumonia of left lower lobe due to infectious organism [J18.9]  COVID-19 [U07.1]  Acute respiratory failure due to COVID-19 ([facility] [U07.1, J96.00]  PMHx/PSHx:  DM  Precautions:  falls, alarm, droplet plus isolation      Social:  Pt lives with wife in a 1 and a half floor plan can stay first floor. Independent without device, has wheeled walker if needed. Initial Evaluation  Date: 22 Treatment      Short Term/ Long Term   Goals   Was pt agreeable to Eval/treatment? yes     Does pt have pain?  No c/o pain     Bed Mobility  Rolling: NT  Supine to sit: NT  Sit to supine: NT  Scooting: NT  independent   Transfers Sit to stand: SBA  Stand to sit: SBA  Stand pivot: SBA  independent   Ambulation    50 feet with no device with SBA  100 feet with device as needed  indepenent   Stair Negotiation  Ascended and descended  NT      LE strength     4-/5    4/5   balance      Fair+     AM-PAC Raw score                        Pt is alert and Oriented   LE ROM: WFL  Sensation: intact  Edema: none  Endurance: fair+  Chair alarm: yes     ASSESSMENT:    Pt displays functional ability as noted in the objective portion of this evaluation. Patient education  Pt educated on PT objectives    Patient response to education:   Pt verbalized understanding Pt demonstrated skill Pt requires further education in this area   yes           Comments:  SpO2 on room air with ambulation 96-99%  Pt with decreased step length with ambulation. Discussed using ww at home as needed. Son reported pt had increased difficulty walking when first getting up this am      Pt's/ family goals   1. To return home    Conditions Requiring Skilled Therapeutic Intervention:    [x]Decreased strength     []Decreased ROM  [x]Decreased functional mobility  [x]Decreased balance   [x]Decreased endurance   []Decreased posture  []Decreased sensation  []Decreased coordination   []Decreased vision  []Decreased safety awareness   []Increased pain       Patient and or family understand(s) diagnosis, prognosis, and plan of care.     Prognosis is good for reaching above PT goals    PHYSICAL THERAPY PLAN OF CARE:    PT POC is established based on physician order and patient diagnosis     Referring provider/PT Order: Ena Ballesteros MD/ PT eval and treat      Current Treatment Recommendations:     [x] Strengthening to improve independence with functional mobility   [] ROM to improve independence with functional mobility   [x] Balance Training to improve static/dynamic balance and to reduce fall risk  [x] Endurance Training to improve activity tolerance during functional mobility   [x] Transfer Training to improve safety and independence with all functional transfers   [x] Gait Training to improve gait mechanics, endurance and assess need for appropriate assistive device  [] Stair Training in preparation for safe discharge home and/or into the community   [] Positioning to prevent skin breakdown and contractures  [x] Safety and Education Training   [x] Patient/Caregiver Education   [] HEP  [] Other     PT long term treatment goals are located in above grid    Frequency of treatments: 2-5x/week x 5 days. Time in  1035  Time out  1050        Evaluation Time includes thorough review of current medical information, gathering information on past medical history/social history and prior level of function, completion of standardized testing/informal observation of tasks, assessment of data and education on plan of care and goals.       CPT codes:  [x] Low Complexity PT evaluation 08006  [] Moderate Complexity PT evaluation 27588  [] High Complexity PT evaluation 90964  [] PT Re-evaluation 45499  [] Gait training 39140 minutes  [] Manual therapy 87249 minutes  [] Therapeutic activities 54892 minutes  [] Therapeutic exercises 48829 minutes  [] Neuromuscular reeducation 95208 minutes     Annalisa Escamilla PT [id number]

## 2022-07-29 NOTE — CONSULTS
Palliative Care Department  [phone]  Palliative Care Initial Consult  Provider Ada Snow, APRN - CNP     Adelfo Wilcox  [id number]  Hospital Day: 2  Date of Initial Consult: 7/29/2022  Referring Provider: Gwen Nance MD  Palliative Medicine was consulted for assistance with: Code Status Discussion     HPI:   Adelfo Wilcox is a 78 y. o. with a medical history of aortic valve replacement, DM, GIB who was admitted on 7/28/2022 from home with a CHIEF COMPLAINT of shortness of breath. Patient tested positive for COVID. Saturations were 88% on room air in ED. Patient was placed on nasal cannula. Imaging significant for patchy confluent opacifications in the left mid and left lower lung. Patient was admitted for further management. Palliative medicine consulted for CODE STATUS discussion. ASSESSMENT/PLAN:     Pertinent Hospital Diagnoses     Hypoxic respiratory failure  COVID  Left-sided pneumonia      Palliative Care Encounter / Counseling Regarding Goals of Care  Please see detailed goals of care discussion as below  At this time, Adelfo Wilcox, Does have capacity for medical decision-making.   Capacity is time limited and situation/question specific  Outcome of goals of care meeting: Continue limited code, continue current management  Code status Limited : No intubation, no compressions, no defibrillation, no resuscitative medications  Advanced Directives: no POA or living will in [facility]  Surrogate/Legal NOK:  Uriel Smith, spouse, [phone], [address], child, [phone]      Spiritual assessment: no spiritual distress identified  Bereavement and grief: to be determined  Referrals to: none today  SUBJECTIVE:     Current medical issues leading to Palliative Medicine involvement include   Active Hospital Problems    Diagnosis Date Noted    Acute respiratory failure due to COVID-19 ([facility] [U07.1, J96.00] 07/28/2022     Priority: Medium    GI bleed [K92.2] 10/09/2020 nonfocal; following commands    Objective data reviewed: labs, images, records, medication use, vitals, and chart    Discussed patient and the plan of care with the other IDT members: Palliative Medicine IDT Team    Time/Communication  Greater than 50% of time spent, total 50 minutes in counseling and coordination of care at the bedside regarding goals of care, diagnosis and prognosis, and see above. Thank you for allowing Palliative Medicine to participate in the care of Kanu Contreras.

## 2022-07-29 NOTE — CARE COORDINATION
Social Work/Discharge Planning:  Social Work consult noted. COVID positive 7/28. Called patient room phone and spoke with his son Isidoro Marc. Completed initial assessment with Isidoro Marc and explained Social Work role. Patient lives with his wife in a 1 and a half story house, but stays on the first floor. PTA patient uses a wheeled walker if needed. Isidoro Marc states his father has a history with home health care, but patient can not recall name of company. Patient has no snf history. Patient PCP is Dr. Donny Solano. Patient on room air. He is on IV antibiotics and to be determined if needed at discharge. Isidoro Marc states plan is home at discharge and they are interested in home health care. Discussed hhc choices and he is agreeable to using [facility] or [address] Referral made to [facility] with [address] and she will review patient information. Patient will need a home health care order at discharge. Will continue to follow and assist with discharge planning.   Electronically signed by TRACE Rothman on 7/29/2022 at 2:06 PM

## 2022-07-29 NOTE — PROGRESS NOTES
Physician Progress Note      PATIENT:               Irina Hurd  CSN #:                  [id number]  :                       1940  ADMIT DATE:       2022 6:20 AM  DISCH DATE:  RESPONDING  PROVIDER #:        Hugo Patrick          QUERY TEXT:    Pt admitted with COVID-19 and noted to have T 102.7, procalcitonin 0.83, CRP   6.6. If possible, please document in progress notes and discharge summary if   you are evaluating and/or treating: The medical record reflects the following:  Risk Factors: COVID-19 pneumonia  Clinical Indicators: on admission T 102.7, procalcitonin 0.83, CRP 6.6,   respiratory failure  Treatment: oxygen, droplet isolation, Remdesivir, Decadron  Doxycycline IV  Thank you,  Geneva Willams RN, CCDS  Clinical Documentation Improvement Specialist  [email or web address]  Options provided:  -- Sepsis present on admission due to COVID-19 pneumonia  -- Covid-19 pneumonia without sepsis  -- Other - I will add my own diagnosis  -- Disagree - Not applicable / Not valid  -- Disagree - Clinically unable to determine / Unknown  -- Refer to Clinical Documentation Reviewer    PROVIDER RESPONSE TEXT:    This patient has sepsis which was present on admission due to COVID-19   pneumonia.     Query created by: Lyubov Osman on 2022 6:10 AM      Electronically signed by:  Hugo Patrick 2022 9:21 AM

## 2022-07-29 NOTE — PROGRESS NOTES
Attempted to ambulate pt to check pulse ox per order at this time. Pt unsteady and unable to ambulate more then a couple steps. Dr. Nish Cortez updated. SPO2 did remain 94-97 during the minimal movement.

## 2022-07-29 NOTE — PROGRESS NOTES
Pulse ox was 97% on room air at rest.   Ambulated patient on room air. Oxygen saturation was 96-99% on room air while ambulating.    Patient ambulated with PT.

## 2022-07-29 NOTE — PROGRESS NOTES
Internal Medicine Progress Note    Patient's name: Dale Pardo  : 1940  Chief complaints (on day of admission): Other (Hypoxia (84% RA) (90%-95% - 2L/NC)), Fever (100.4), Positive For Covid-19 (22), and Fatigue  Admission date: 2022  Date of service: 2022   Room: [place] [address]  Primary care physician: Antoine Cantu MD  Reason for visit: Follow-up for covid pna     Subjective  Delmy Boles was seen and examined at bedside     Doing very well today   He is awake and alert   Amb with pulse ox revealed no need for continued O2 therapy   Discussed at length with patient and son at bedside   He ate well and is drinking and going to the bathroom without difficulty   Tolerating therapy well     Current treatment plan discussed and all questions answered     Current medications being prescribed discussed and patient expresses verbal understanding   Review of Systems  There are no new complaints of chest pain, shortness of breath, abdominal pain, nausea, vomiting, diarrhea, constipation unless otherwise mentioned above.      Hospital Medications  Current Facility-Administered Medications   Medication Dose Route Frequency Provider Last Rate Last Admin    sodium chloride flush 0.9 % injection 10 mL  10 mL IntraVENous 2 times per day Hanh Norris MD   10 mL at 22 0911    sodium chloride flush 0.9 % injection 10 mL  10 mL IntraVENous PRN Hanh Norris MD        0.9 % sodium chloride infusion   IntraVENous PRN Hanh Norris MD        enoxaparin (LOVENOX) injection 40 mg  40 mg SubCUTAneous Daily Hanh Norris MD   40 mg at 22 0910    ondansetron (ZOFRAN-ODT) disintegrating tablet 4 mg  4 mg Oral Q8H PRN Hanh Norris MD        Or    [facility]ansetron [facility]) injection 4 mg  4 mg IntraVENous Q6H PRN Hanh Norris MD        senna (SENOKOT) tablet 8.6 mg  1 tablet Oral Daily PRN Hanh Norris MD        acetaminophen (TYLENOL) tablet 650 mg  650 mg Oral Q6H PRN Hanh Norris MD        Or acetaminophen (TYLENOL) suppository 650 mg  650 mg Rectal Q6H PRN Marimar Castillo MD        glucose chewable tablet 16 g  4 tablet Oral PRN Marimar Castillo MD        dextrose bolus 10% 125 mL  125 mL IntraVENous PRN Marimar Castillo MD        Or    dextrose bolus 10% 250 mL  250 mL IntraVENous PRN Marimar Castillo MD        glucagon (rDNA) injection 1 mg  1 mg SubCUTAneous PRN Marimar Castillo MD        dextrose 10 % infusion   IntraVENous Continuous PRN Marimar Castillo MD        dexamethasone (DECADRON) injection 6 mg  6 mg IntraVENous Daily Marimar Castillo MD   6 mg at 07/29/22 5883    remdesivir 100 mg in sodium chloride 0.9 % 250 mL IVPB  100 mg IntraVENous Q24H Marimar Castillo MD        0.9 % sodium chloride bolus  30 mL IntraVENous PRN aMrimar Castillo MD        metoprolol tartrate (LOPRESSOR) tablet 25 mg  25 mg Oral BID Marimar Castillo MD   25 mg at 07/29/22 0910    tamsulosin (FLOMAX) capsule 0.4 mg  0.4 mg Oral Nightly Marimar Castillo MD   0.4 mg at 07/28/22 2030    vitamin D (CHOLECALCIFEROL) tablet 1,000 Units  1,000 Units Oral QAM Marimar Castillo MD   1,000 Units at 07/29/22 0910    insulin lispro (HUMALOG) injection vial 0-8 Units  0-8 Units SubCUTAneous TID WC Marimar Castillo MD        insulin lispro (HUMALOG) injection vial 0-4 Units  0-4 Units SubCUTAneous Nightly Marimar Castillo MD        doxycycline (VIBRAMYCIN) 100 mg in dextrose 5 % 100 mL IVPB  100 mg IntraVENous Q12H Marimar Castillo MD   Stopped at 07/28/22 2208    cefTRIAXone (ROCEPHIN) 1,000 mg in sterile water 10 mL IV syringe  1,000 mg IntraVENous Q24H Marimar Castillo MD   1,000 mg at 07/29/22 0911       PRN Medications  sodium chloride flush, sodium chloride, ondansetron **OR** ondansetron, senna, acetaminophen **OR** acetaminophen, glucose, dextrose bolus **OR** dextrose bolus, glucagon (rDNA), dextrose, sodium chloride    Objective  Most Recent Recorded Vitals  /67   Pulse 63   Temp 97.8 °F (36.6 °C) (Oral)   Resp 18   Ht 6' 3\" (1.905 m)   Wt 221 lb 3.2 oz (100.3 kg)   SpO2 98%   BMI 27.65 kg/m²   I/O last 3 completed shifts: In: 370 [P.O.:360; I.V.:10]  Out: 300 [Urine:300]  No intake/output data recorded. Physical Exam:  General: AAO to person/place/time/purpose, NAD, no labored breathing  Eyes: conjunctivae/corneas clear, sclera non icteric  Skin: color/texture/turgor normal, no rashes or lesions  Lungs: CTAB, no retractions/use of accessory muscles, no vocal fremitus, no rhonchi, no crackle, no rales  Heart: regular rate, regular rhythm, no murmur  Abdomen: soft, NT, bowel sounds normal  Extremities: atraumatic, no edema  Neurologic: cranial nerves 2-12 grossly intact, no slurred speech    Most Recent Labs  Lab Results   Component Value Date    WBC 12.4 (H) 07/29/2022    HGB 12.4 (L) 07/29/2022    HCT 37.1 07/29/2022    PLT 85 (L) 07/29/2022     07/29/2022    K 4.4 07/29/2022     07/29/2022    CREATININE 1.1 07/29/2022    BUN 37 (H) 07/29/2022    CO2 20 (L) 07/29/2022    GLUCOSE 132 (H) 07/29/2022    ALT 34 07/29/2022    AST 48 (H) 07/29/2022    INR 1.3 07/28/2022    LABA1C 6.0 (H) 07/29/2022       CTA PULMONARY W CONTRAST   Final Result   No evidence for pulmonary embolism      Patchy confluent opacifications in the left mid and lower lung of a left   lower lobe bronchopneumonia parents without pleural effusion      No acute findings of the abdomen or pelvis. Pancolonic diverticulosis   without acute diverticulitis         CT ABDOMEN PELVIS W IV CONTRAST Additional Contrast? None   Final Result   No evidence for pulmonary embolism      Patchy confluent opacifications in the left mid and lower lung of a left   lower lobe bronchopneumonia parents without pleural effusion      No acute findings of the abdomen or pelvis. Pancolonic diverticulosis   without acute diverticulitis         XR CHEST PORTABLE   Final Result   Developing atelectasis and or infiltrate at the left lower lobe. XR ANKLE LEFT (MIN 3 VIEWS)   Final Result   Heel spurs. Echocardiogram       Assessment   Active Hospital Problems    Diagnosis     Acute respiratory failure due to COVID-19 ([facility]) [U07.1, J96.00]      Priority: Medium    GI bleed [K92.2]     Aortocoronary bypass status [Z95.1]     H/O aortic valve replacement [Z95.2]     H/O mitral valve repair [Z98.890]     Pure hypercholesterolemia [E78.00]     Presence of permanent cardiac pacemaker [Z95.0]     Aortic stenosis, mild [I35.0]     Mild dilation of ascending aorta (HCC) [I77.810]     Near syncope [R55]     MGUS (monoclonal gammopathy of unknown significance) [D47.2]     Chronic gout [M1A.9XX0]     Type 2 diabetes mellitus without complication ([facility]) [H76.8]     Hx of colonic polyps [Z86.010]     Hypertension [I10]          Plan  Hypoxic respiratory failure 2/2 SARS-CoV-2 infection  Inflammatory markers - crp trending up today  CTA neg for PE on 7/28  Lovenox  Decadron  Remdesivir   Anti tussives as needed  Ambulate with pulse ox today please   Check CRP again tomorrow, it trended up today   L sided pneumonia superimposed bacterial pna   Procal 0.8  Rocephin and Doxy continue day 2   Resp cx ordered  PT AM-PAC-- TBD   DVT prophylaxis   Code status Full   Medications, labs and imaging reviewed   Discharge plan: Planning for DC home tomorrow if continued improvement on PO abx     Electronically signed by Irina Prado MD on 7/29/2022 at 9:23 AM    I can be reached through [employer].

## 2022-07-30 VITALS
DIASTOLIC BLOOD PRESSURE: 71 MMHG | HEART RATE: 67 BPM | RESPIRATION RATE: 18 BRPM | SYSTOLIC BLOOD PRESSURE: 148 MMHG | BODY MASS INDEX: 27.15 KG/M2 | TEMPERATURE: 97.2 F | HEIGHT: 75 IN | WEIGHT: 218.4 LBS | OXYGEN SATURATION: 100 %

## 2022-07-30 LAB
ALBUMIN SERPL-MCNC: 3.7 G/DL (ref 3.5–5.2)
ALP BLD-CCNC: 103 U/L (ref 40–129)
ALT SERPL-CCNC: 39 U/L (ref 0–40)
ANION GAP SERPL CALCULATED.3IONS-SCNC: 11 MMOL/L (ref 7–16)
AST SERPL-CCNC: 50 U/L (ref 0–39)
BASOPHILS ABSOLUTE: 0 E9/L (ref 0–0.2)
BASOPHILS RELATIVE PERCENT: 0 % (ref 0–2)
BILIRUB SERPL-MCNC: 0.5 MG/DL (ref 0–1.2)
BUN BLDV-MCNC: 54 MG/DL (ref 6–23)
C-REACTIVE PROTEIN: 11.1 MG/DL (ref 0–0.4)
CALCIUM SERPL-MCNC: 8.9 MG/DL (ref 8.6–10.2)
CHLORIDE BLD-SCNC: 105 MMOL/L (ref 98–107)
CO2: 21 MMOL/L (ref 22–29)
CREAT SERPL-MCNC: 1.2 MG/DL (ref 0.7–1.2)
CULTURE, RESPIRATORY: NORMAL
D DIMER: 843 NG/ML DDU
EOSINOPHILS ABSOLUTE: 0 E9/L (ref 0.05–0.5)
EOSINOPHILS RELATIVE PERCENT: 0 % (ref 0–6)
GFR AFRICAN AMERICAN: >60
GFR NON-AFRICAN AMERICAN: 58 ML/MIN/1.73
GLUCOSE BLD-MCNC: 185 MG/DL (ref 74–99)
HCT VFR BLD CALC: 37.1 % (ref 37–54)
HEMOGLOBIN: 12.6 G/DL (ref 12.5–16.5)
IMMATURE GRANULOCYTES #: 0.09 E9/L
IMMATURE GRANULOCYTES %: 0.8 % (ref 0–5)
LYMPHOCYTES ABSOLUTE: 0.65 E9/L (ref 1.5–4)
LYMPHOCYTES RELATIVE PERCENT: 5.5 % (ref 20–42)
MCH RBC QN AUTO: 33.1 PG (ref 26–35)
MCHC RBC AUTO-ENTMCNC: 34 % (ref 32–34.5)
MCV RBC AUTO: 97.4 FL (ref 80–99.9)
METER GLUCOSE: 169 MG/DL (ref 74–99)
METER GLUCOSE: 320 MG/DL (ref 74–99)
MONOCYTES ABSOLUTE: 0.49 E9/L (ref 0.1–0.95)
MONOCYTES RELATIVE PERCENT: 4.1 % (ref 2–12)
NEUTROPHILS ABSOLUTE: 10.67 E9/L (ref 1.8–7.3)
NEUTROPHILS RELATIVE PERCENT: 89.6 % (ref 43–80)
PDW BLD-RTO: 14.6 FL (ref 11.5–15)
PLATELET # BLD: 95 E9/L (ref 130–450)
PLATELET CONFIRMATION: NORMAL
PMV BLD AUTO: 12.1 FL (ref 7–12)
POTASSIUM REFLEX MAGNESIUM: 4.4 MMOL/L (ref 3.5–5)
RBC # BLD: 3.81 E12/L (ref 3.8–5.8)
SMEAR, RESPIRATORY: NORMAL
SODIUM BLD-SCNC: 137 MMOL/L (ref 132–146)
TOTAL PROTEIN: 7.1 G/DL (ref 6.4–8.3)
WBC # BLD: 11.9 E9/L (ref 4.5–11.5)

## 2022-07-30 PROCEDURE — 86140 C-REACTIVE PROTEIN: CPT

## 2022-07-30 PROCEDURE — 6370000000 HC RX 637 (ALT 250 FOR IP): Performed by: STUDENT IN AN ORGANIZED HEALTH CARE EDUCATION/TRAINING PROGRAM

## 2022-07-30 PROCEDURE — 82962 GLUCOSE BLOOD TEST: CPT

## 2022-07-30 PROCEDURE — 2580000003 HC RX 258: Performed by: STUDENT IN AN ORGANIZED HEALTH CARE EDUCATION/TRAINING PROGRAM

## 2022-07-30 PROCEDURE — 6360000002 HC RX W HCPCS: Performed by: STUDENT IN AN ORGANIZED HEALTH CARE EDUCATION/TRAINING PROGRAM

## 2022-07-30 PROCEDURE — 80053 COMPREHEN METABOLIC PANEL: CPT

## 2022-07-30 PROCEDURE — 85025 COMPLETE CBC W/AUTO DIFF WBC: CPT

## 2022-07-30 PROCEDURE — 2500000003 HC RX 250 WO HCPCS: Performed by: STUDENT IN AN ORGANIZED HEALTH CARE EDUCATION/TRAINING PROGRAM

## 2022-07-30 PROCEDURE — 36415 COLL VENOUS BLD VENIPUNCTURE: CPT

## 2022-07-30 PROCEDURE — 85378 FIBRIN DEGRADE SEMIQUANT: CPT

## 2022-07-30 RX ORDER — DOXYCYCLINE HYCLATE 100 MG
100 TABLET ORAL 2 TIMES DAILY
Qty: 10 TABLET | Refills: 0 | Status: SHIPPED | OUTPATIENT
Start: 2022-07-30 | End: 2022-08-04

## 2022-07-30 RX ORDER — ASPIRIN 81 MG/1
81 TABLET ORAL DAILY
Qty: 21 TABLET | Refills: 0 | Status: SHIPPED | OUTPATIENT
Start: 2022-07-30 | End: 2022-08-20

## 2022-07-30 RX ORDER — FAMOTIDINE 20 MG/1
20 TABLET, FILM COATED ORAL DAILY
Qty: 21 TABLET | Refills: 0 | Status: SHIPPED | OUTPATIENT
Start: 2022-07-30 | End: 2022-08-20

## 2022-07-30 RX ORDER — ASPIRIN 81 MG/1
81 TABLET ORAL DAILY
Qty: 21 TABLET | Refills: 0 | Status: SHIPPED | OUTPATIENT
Start: 2022-07-30 | End: 2022-07-30 | Stop reason: SDUPTHER

## 2022-07-30 RX ORDER — CEPHALEXIN 500 MG/1
500 CAPSULE ORAL 4 TIMES DAILY
Qty: 20 CAPSULE | Refills: 0 | Status: SHIPPED | OUTPATIENT
Start: 2022-07-30 | End: 2022-08-04

## 2022-07-30 RX ORDER — DEXAMETHASONE 6 MG/1
6 TABLET ORAL
Qty: 7 TABLET | Refills: 0 | Status: SHIPPED | OUTPATIENT
Start: 2022-07-30 | End: 2022-08-06

## 2022-07-30 RX ORDER — ASCORBIC ACID 250 MG
250 TABLET ORAL DAILY
Qty: 30 TABLET | Refills: 0 | Status: SHIPPED | OUTPATIENT
Start: 2022-07-30 | End: 2022-08-29

## 2022-07-30 RX ADMIN — ENOXAPARIN SODIUM 40 MG: 100 INJECTION SUBCUTANEOUS at 09:18

## 2022-07-30 RX ADMIN — METOPROLOL TARTRATE 25 MG: 25 TABLET, FILM COATED ORAL at 09:18

## 2022-07-30 RX ADMIN — Medication 1000 UNITS: at 09:18

## 2022-07-30 RX ADMIN — DEXAMETHASONE SODIUM PHOSPHATE 6 MG: 10 INJECTION INTRAMUSCULAR; INTRAVENOUS at 09:18

## 2022-07-30 RX ADMIN — DOXYCYCLINE 100 MG: 100 INJECTION, POWDER, LYOPHILIZED, FOR SOLUTION INTRAVENOUS at 11:01

## 2022-07-30 RX ADMIN — INSULIN LISPRO 6 UNITS: 100 INJECTION, SOLUTION INTRAVENOUS; SUBCUTANEOUS at 11:03

## 2022-07-30 RX ADMIN — WATER 1000 MG: 1 INJECTION INTRAMUSCULAR; INTRAVENOUS; SUBCUTANEOUS at 09:18

## 2022-07-30 RX ADMIN — SODIUM CHLORIDE, PRESERVATIVE FREE 10 ML: 5 INJECTION INTRAVENOUS at 09:18

## 2022-07-30 ASSESSMENT — PAIN SCALES - GENERAL: PAINLEVEL_OUTOF10: 0

## 2022-07-30 NOTE — PROGRESS NOTES
Patient Covid positive. All discharge instructions and medications explained in room to pt. Stated understanding.

## 2022-07-30 NOTE — DISCHARGE SUMMARY
Internal Medicine Discharge Summary    NAME: Carlton Teixeira :  1940  MRN:  [id number] Shauna Lowery MD    ADMITTED: 2022   DISCHARGED: 2022  2:32 PM    ADMITTING PHYSICIAN: No att. providers found    PCP: Lyle Roberts MD    CONSULTANT(S):   IP CONSULT TO INTERNAL MEDICINE  IP CONSULT TO CASE MANAGEMENT  IP CONSULT TO SOCIAL WORK  IP CONSULT TO PHARMACY  IP CONSULT TO PALLIATIVE CARE  IP CONSULT TO HOME CARE NEEDS     ADMITTING DIAGNOSIS:   Acute respiratory failure with hypoxia ([facility] [J96.01]  Pneumonia of left lower lobe due to infectious organism [J18.9]  COVID-19 [U07.1]  Acute respiratory failure due to COVID-19 ([facility] [U07.1, J96.00]     Please see H&P for further details    DISCHARGE DIAGNOSES:   Active Hospital Problems    Diagnosis     Acute respiratory failure due to COVID-19 ([facility] [U07.1, J96.00]      Priority: Medium    GI bleed [K92.2]     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 male patient of Lyle Roberts MD who  has a past medical history of Diabetes mellitus ([facility] [facility] Erectile dysfunction, Gout, Hypertension, and Vitamin D deficiency. who originally had concerns including Other (Hypoxia (84% RA) (90%-95% - 2L/NC)), Fever (100.4), Positive For Covid-19 (22), and Fatigue.  at presentation on 2022, and was found to have Acute respiratory failure with hypoxia ([address] [J96.01]  Pneumonia of left lower lobe due to infectious organism [J18.9]  COVID-19 [U07.1]  Acute respiratory failure due to COVID-19 (HCC) [U07.1, J96.00] after workup. Please see H&P for further details. HOSPITAL COURSE:   The patient presented to the hospital with the chief complaint of Other (Hypoxia (84% RA) (90%-95% - 2L/NC)), Fever (100.4), Positive For Covid-19 (7/26/22), and Fatigue  . The patient was admitted to the hospital.     Hypoxic respiratory failure 2/2 SARS-CoV-2 infection  Inflammatory markers - crp trending up today  CTA neg for PE on 7/28  Lovenox  Decadron  Remdesivir   Anti tussives as needed  Ambulate with pulse ox today please   CRP moving down   L sided pneumonia superimposed bacterial pna   Procal 0.8  Rocephin and Doxy can be switched to PO Keflex and Doxy for DC today   Resp cx ordered  PT AM-PAC-- TBD   DVT prophylaxis   Code status Full   Medications, labs and imaging reviewed   Discharge plan: Planning for DC home today on PO abx, decadron, asa for 3 weeks with famotidine and vitamin c, they will see dr noel in 1 week       BRIEF PHYSICAL EXAMINATION AND LABORATORIES ON DAY OF DISCHARGE:  VITALS:  BP (!) 148/71   Pulse 67   Temp 97.2 °F (36.2 °C) (Temporal)   Resp 18   Ht 6' 3\" (1.905 m)   Wt 218 lb 6.4 oz (99.1 kg)   SpO2 100%   PF (!) 20 L/min   BMI 27.30 kg/m²       Please see note from the same day.      LABS[de-identified]  Recent Labs     07/28/22  1304 07/29/22  0543 07/30/22  0331    138 137   K 4.0 4.4 4.4    104 105   CO2 21* 20* 21*   BUN 27* 37* 54*   CREATININE 1.1 1.1 1.2   GLUCOSE 128* 132* 185*   CALCIUM 8.8 8.8 8.9     Recent Labs     07/28/22  1304 07/29/22  0543 07/30/22  0331   ALKPHOS 100 97 103   ALT 35 34 39   AST 40* 48* 50*   PROT 7.3 7.2 7.1   BILITOT 0.6 0.6 0.5   LABALBU 4.1 3.7 3.7     Recent Labs     07/28/22  1304 07/29/22  0543 07/30/22  0331   WBC 8.1 12.4* 11.9*   RBC 3.79* 3.81 3.81   HGB 12.2* 12.4* 12.6   HCT 37.4 37.1 37.1   MCV 98.7 97.4 97.4   MCH 32.2 32.5 33.1 MCHC 32.6 33.4 34.0   RDW 14.8 14.6 14.6   PLT 79* 85* 95*   MPV 11.5 11.8 12.1*     Lab Results   Component Value Date    LABA1C 6.0 (H) 07/29/2022    LABA1C 5.8 03/16/2020    LABA1C 5.7 04/05/2019     Lab Results   Component Value Date    INR 1.3 07/28/2022    INR 1.5 10/09/2020    INR 1.1 05/05/2019    PROTIME 14.5 (H) 07/28/2022    PROTIME 18.2 (H) 10/09/2020    PROTIME 12.5 (H) 05/05/2019      No results found for: TSH  Lab Results   Component Value Date    TRIG 72 04/05/2019    TRIG 106 09/19/2018    TRIG 63 04/18/2018    HDL 69 04/05/2019    HDL 70 09/19/2018    HDL 89 (A) 04/18/2018    LDLCALC 109 04/18/2018    LDLCALC 73 09/22/2017    LDLCALC 81 03/20/2017     No results for input(s): MG in the last 72 hours. No results for input(s): CKTOTAL, CKMB, TROPONINI in the last 72 hours. Recent Labs     07/28/22  1350   LACTA 1.8       IMAGING:  XR ANKLE LEFT (MIN 3 VIEWS)    Result Date: 7/28/2022  EXAMINATION: THREE XRAY VIEWS OF THE LEFT ANKLE 7/28/2022 6:40 am COMPARISON: None. HISTORY: ORDERING SYSTEM PROVIDED HISTORY: fall TECHNOLOGIST PROVIDED HISTORY: Reason for exam:->fall FINDINGS: No definite widening of the ankle mortise. No fracture or dislocation. Achilles and plantar heel spurs are present. Normal soft tissues. Heel spurs. CT ABDOMEN PELVIS W IV CONTRAST Additional Contrast? None    Result Date: 7/28/2022  EXAMINATION: CTA OF THE CHEST; CT OF THE ABDOMEN AND PELVIS WITH CONTRAST 7/28/2022 9:17 am TECHNIQUE: CTA of the chest was performed after the administration of intravenous contrast.  Multiplanar reformatted images are provided for review. 3D/MIP images are provided for review.  Automated exposure control, iterative reconstruction, and/or weight based adjustment of the mA/kV was utilized to reduce the radiation dose to as low as reasonably achievable.; CT of the abdomen and pelvis was performed with the administration of intravenous contrast. Multiplanar reformatted images are provided for review. Automated exposure control, iterative reconstruction, and/or weight based adjustment of the mA/kV was utilized to reduce the radiation dose to as low as reasonably achievable. COMPARISON: None HISTORY: ORDERING SYSTEM PROVIDED HISTORY: r/o PE TECHNOLOGIST PROVIDED HISTORY: Reason for exam:->r/o PE Decision Support Exception - unselect if not a suspected or confirmed emergency medical condition->Emergency Medical Condition (MA); ORDERING SYSTEM PROVIDED HISTORY: L hip pain s/p fall TECHNOLOGIST PROVIDED HISTORY: Additional Contrast?->None Reason for exam:->L hip pain s/p fall Decision Support Exception - unselect if not a suspected or confirmed emergency medical condition->Emergency Medical Condition (MA) FINDINGS: CTA chest: Pulmonary Arteries: Pulmonary arteries are adequately opacified for evaluation. No evidence of intraluminal filling defect to suggest pulmonary embolism. Main pulmonary artery is normal in caliber. Mediastinum: No evidence of mediastinal lymphadenopathy. The heart and pericardium demonstrate no acute abnormality. There is no acute abnormality of the thoracic aorta with noted anatomic variance of a right retroesophageal aberrant subclavian artery. . Lungs/pleura: Patchy confluent opacifications in the left mid and left lower lobe of airspace disease bronchopneumonia. No evidence of pleural effusion or pneumothorax. Soft Tissues/Bones: No acute bone or soft tissue abnormality. Abdomen/Pelvis: Organs: Liver without focal lesion. Gallbladder unremarkable. Pancreas and spleen unremarkable. Adrenals without nodule. Kidneys without suspicious renal lesion and no hydronephrosis. GI/Bowel: No focal thickening or disproportion dilatation of bowel. No inflammatory findings. Pancolonic diverticulosis without acute diverticulitis Pelvis: No suspicious pelvic lesion or bulky pelvic adenopathy/free fluid.  Prostatic treatment seeds in place without abnormal soft tissue extension or bulk involvement Peritoneum/Retroperitoneum: No bulky retroperitoneal adenopathy. No suspicious peritoneal or mesenteric process Vasculature: Grossly normal caliber of abdominal aorta and vasculature Bones/Soft Tissues: No acute osseous or soft tissue findings. No evidence for pulmonary embolism Patchy confluent opacifications in the left mid and lower lung of a left lower lobe bronchopneumonia parents without pleural effusion No acute findings of the abdomen or pelvis. Pancolonic diverticulosis without acute diverticulitis     XR CHEST PORTABLE    Result Date: 7/28/2022  EXAMINATION: ONE XRAY VIEW OF THE CHEST 7/28/2022 6:40 am COMPARISON: 5 May 2019 HISTORY: ORDERING SYSTEM PROVIDED HISTORY: sob TECHNOLOGIST PROVIDED HISTORY: Reason for exam:->sob FINDINGS: Postsurgical changes and left-sided pacemaker now present. There is opacity at the left lower lobe which may relate to atelectasis and or infiltrate. Aeration appears modestly worse compared to the prior study. Normal heart and pulmonary vascularity. Developing atelectasis and or infiltrate at the left lower lobe. CTA PULMONARY W CONTRAST    Result Date: 7/28/2022  EXAMINATION: CTA OF THE CHEST; CT OF THE ABDOMEN AND PELVIS WITH CONTRAST 7/28/2022 9:17 am TECHNIQUE: CTA of the chest was performed after the administration of intravenous contrast.  Multiplanar reformatted images are provided for review. 3D/MIP images are provided for review. Automated exposure control, iterative reconstruction, and/or weight based adjustment of the mA/kV was utilized to reduce the radiation dose to as low as reasonably achievable.; CT of the abdomen and pelvis was performed with the administration of intravenous contrast. Multiplanar reformatted images are provided for review. Automated exposure control, iterative reconstruction, and/or weight based adjustment of the mA/kV was utilized to reduce the radiation dose to as low as reasonably achievable.  COMPARISON: None HISTORY: ORDERING SYSTEM PROVIDED HISTORY: r/o PE TECHNOLOGIST PROVIDED HISTORY: Reason for exam:->r/o PE Decision Support Exception - unselect if not a suspected or confirmed emergency medical condition->Emergency Medical Condition (MA); ORDERING SYSTEM PROVIDED HISTORY: L hip pain s/p fall TECHNOLOGIST PROVIDED HISTORY: Additional Contrast?->None Reason for exam:->L hip pain s/p fall Decision Support Exception - unselect if not a suspected or confirmed emergency medical condition->Emergency Medical Condition (MA) FINDINGS: CTA chest: Pulmonary Arteries: Pulmonary arteries are adequately opacified for evaluation. No evidence of intraluminal filling defect to suggest pulmonary embolism. Main pulmonary artery is normal in caliber. Mediastinum: No evidence of mediastinal lymphadenopathy. The heart and pericardium demonstrate no acute abnormality. There is no acute abnormality of the thoracic aorta with noted anatomic variance of a right retroesophageal aberrant subclavian artery. . Lungs/pleura: Patchy confluent opacifications in the left mid and left lower lobe of airspace disease bronchopneumonia. No evidence of pleural effusion or pneumothorax. Soft Tissues/Bones: No acute bone or soft tissue abnormality. Abdomen/Pelvis: Organs: Liver without focal lesion. Gallbladder unremarkable. Pancreas and spleen unremarkable. Adrenals without nodule. Kidneys without suspicious renal lesion and no hydronephrosis. GI/Bowel: No focal thickening or disproportion dilatation of bowel. No inflammatory findings. Pancolonic diverticulosis without acute diverticulitis Pelvis: No suspicious pelvic lesion or bulky pelvic adenopathy/free fluid. Prostatic treatment seeds in place without abnormal soft tissue extension or bulk involvement Peritoneum/Retroperitoneum: No bulky retroperitoneal adenopathy.  No suspicious peritoneal or mesenteric process Vasculature: Grossly normal caliber of abdominal aorta and vasculature Bones/Soft Tissues: No acute osseous or soft tissue findings. No evidence for pulmonary embolism Patchy confluent opacifications in the left mid and lower lung of a left lower lobe bronchopneumonia parents without pleural effusion No acute findings of the abdomen or pelvis. Pancolonic diverticulosis without acute diverticulitis       MICROBIOLOGY:  BLOOD CX #1  Recent Labs     07/28/22  0702   BC 24 Hours no growth     BLOOD CX #2  Recent Labs     07/28/22  0945   BLOODCULT2 24 Hours no growth     TIP CULTURE  No results for input(s): CXCATHTIP in the last 72 hours. CULTURE, RESPIRATORY   Recent Labs     07/28/22  1304   CULTRESP Oral Pharyngeal Stacey present     RESPIRATORY SMEAR  Recent Labs     07/28/22  1304   RESPSMEAR Moderate Polymorphonuclear leukocytes  Epithelial cells not seen  Few Mononuclear leukocytes  Abundant Mixed Bacterial Morphotypes            ECHO:      DISPOSITION:  The patient's condition is good. The patient is being discharged to home    DISCHARGE MEDICATIONS:      Medication List        START taking these medications      ascorbic acid 250 MG tablet  Commonly known as: V-R VITAMIN C  Take 1 tablet by mouth in the morning. aspirin EC 81 MG EC tablet  Take 1 tablet by mouth in the morning for 21 days. cephALEXin 500 MG capsule  Commonly known as: KEFLEX  Take 1 capsule by mouth in the morning and 1 capsule at noon and 1 capsule in the evening and 1 capsule before bedtime. Do all this for 5 days. dexamethasone 6 MG tablet  Commonly known as: DECADRON  Take 1 tablet by mouth daily (with breakfast) for 7 days     doxycycline hyclate 100 MG tablet  Commonly known as: VIBRA-TABS  Take 1 tablet by mouth in the morning and 1 tablet before bedtime. Do all this for 5 days. famotidine 20 MG tablet  Commonly known as: PEPCID  Take 1 tablet by mouth in the morning for 21 days.             CHANGE how you take these medications      metFORMIN 500 MG tablet  Commonly known as: GLUCOPHAGE  What changed: Another medication with the same name was removed. Continue taking this medication, and follow the directions you see here. CONTINUE taking these medications      atorvastatin 40 MG tablet  Commonly known as: LIPITOR     lisinopril 40 MG tablet  Commonly known as: PRINIVIL;ZESTRIL     Mens 50+ Multi Vitamin/Min Tabs     metoprolol tartrate 25 MG tablet  Commonly known as: LOPRESSOR     tamsulosin 0.4 MG capsule  Commonly known as: FLOMAX     vitamin D 1000 UNIT Tabs tablet  Commonly known as: CHOLECALCIFEROL            STOP taking these medications      nirmatrelvir/ritonavir 20 x 150 MG & 10 x 100MG  Commonly known as: PAXLOVID               Where to Get Your Medications        These medications were sent to [address] [place] [address] Avenue [phone]  / [facility] Leonard , [address] [place] [id number]      Phone: [phone]   ascorbic acid 250 MG tablet  aspirin EC 81 MG EC tablet  cephALEXin 500 MG capsule  dexamethasone 6 MG tablet  doxycycline hyclate 100 MG tablet  famotidine 20 MG tablet         Discharge Medication List as of 7/30/2022 12:36 PM        CONTINUE these medications which have CHANGED    Details   aspirin EC 81 MG EC tablet Take 1 tablet by mouth in the morning for 21 days. , Disp-21 tablet, R-0Normal           Discharge Medication List as of 7/30/2022 12:36 PM        STOP taking these medications       nirmatrelvir/ritonavir (PAXLOVID) 20 x 150 MG & 10 x 100MG Comments:   Reason for Stopping:             Discharge Medication List as of 7/30/2022 12:36 PM        START taking these medications    Details   dexamethasone (DECADRON) 6 MG tablet Take 1 tablet by mouth daily (with breakfast) for 7 days, Disp-7 tablet, R-0Normal      cephALEXin (KEFLEX) 500 MG capsule Take 1 capsule by mouth in the morning and 1 capsule at noon and 1 capsule in the evening and 1 capsule before bedtime. Do all this for 5 days. , Disp-20 capsule, R-0Normal doxycycline hyclate (VIBRA-TABS) 100 MG tablet Take 1 tablet by mouth in the morning and 1 tablet before bedtime. Do all this for 5 days. , Disp-10 tablet, R-0Normal      famotidine (PEPCID) 20 MG tablet Take 1 tablet by mouth in the morning for 21 days. , Disp-21 tablet, R-0Normal      ascorbic acid (V-R VITAMIN C) 250 MG tablet Take 1 tablet by mouth in the morning., Disp-30 tablet, R-0Normal             INTERNAL MEDICINE FOLLOW UP/INSTRUCTIONS:  Follow-up with primary care physician within 1 week of discharge from hospital  Please review changes to pre-hospital admission medications and prescriptions for new medications upon discharge from the hospital with PCP  Please review results of labs and imaging studies with PCP  Follow-up with consultants as directed by them   If recurrence or worsening of symptoms please call primary care physician or return to the ER immediately  Diet: ADULT DIET; Regular    Preparing for this patient's discharge, including paperwork, orders, instructions, and meeting with patient did required >35 minutes.     Electronically signed by Cynthia Dill MD on 7/30/2022 at 3:28 PM

## 2022-07-30 NOTE — PROGRESS NOTES
Pulse ox was 100% on room air at rest.   Ambulated patient on room air. Oxygen saturation was % on room air while ambulating.

## 2022-07-30 NOTE — PROGRESS NOTES
Internal Medicine Progress Note    Patient's name: Wang Nur  : 1940  Chief complaints (on day of admission): Other (Hypoxia (84% RA) (90%-95% - 2L/NC)), Fever (100.4), Positive For Covid-19 (22), and Fatigue  Admission date: 2022  Date of service: 2022   Room: [place] [id number]  Primary care physician: Joe Rossi MD  Reason for visit: Follow-up for covid pna     Subjective  Colan Face was seen and examined at bedside     Doing very well today   Daughter at bedside   DC plans discussed at length   All meds explained in detail and questions answered  Warning signs that would necessitate return to ED discussed they expressed verbal understanding   Discussed with nursing staff     Current treatment plan discussed and all questions answered     Current medications being prescribed discussed and patient expresses verbal understanding   Review of Systems  There are no new complaints of chest pain, shortness of breath, abdominal pain, nausea, vomiting, diarrhea, constipation unless otherwise mentioned above.      Hospital Medications  Current Facility-Administered Medications   Medication Dose Route Frequency Provider Last Rate Last Admin    sodium chloride flush 0.9 % injection 10 mL  10 mL IntraVENous 2 times per day Bertha Zambrano MD   10 mL at 22    sodium chloride flush 0.9 % injection 10 mL  10 mL IntraVENous PRN Bertha Zambrano MD        0.9 % sodium chloride infusion   IntraVENous PRN Bertha Zambrano MD        enoxaparin (LOVENOX) injection 40 mg  40 mg SubCUTAneous Daily Bertha Zambrano MD   40 mg at 22 0910    ondansetron (ZOFRAN-ODT) disintegrating tablet 4 mg  4 mg Oral Q8H PRN Bertha Zambraon MD        Or    ondansetron [facility]) injection 4 mg  4 mg IntraVENous Q6H PRN Bertha Zambrano MD        senna (SENOKOT) tablet 8.6 mg  1 tablet Oral Daily PRN Bertha Zambrano MD        acetaminophen (TYLENOL) tablet 650 mg  650 mg Oral Q6H PRN Bertha Zambrano MD        Or acetaminophen (TYLENOL) suppository 650 mg  650 mg Rectal Q6H PRN Cynthia Dill MD        glucose chewable tablet 16 g  4 tablet Oral PRN Cynthia Dill MD        dextrose bolus 10% 125 mL  125 mL IntraVENous PRN Cynthia Dill MD        Or    dextrose bolus 10% 250 mL  250 mL IntraVENous PRN Cynthia Dill MD        glucagon (rDNA) injection 1 mg  1 mg SubCUTAneous PRN Cynthia Dill MD        dextrose 10 % infusion   IntraVENous Continuous PRN Cynthia Dill MD        dexamethasone (DECADRON) injection 6 mg  6 mg IntraVENous Daily Cynthia Dill MD   6 mg at 07/29/22 4063    remdesivir 100 mg in sodium chloride 0.9 % 250 mL IVPB  100 mg IntraVENous Q24H Cynthia Dill MD   Stopped at 07/29/22 1738    0.9 % sodium chloride bolus  30 mL IntraVENous PRN Cynthia Dill MD        metoprolol tartrate (LOPRESSOR) tablet 25 mg  25 mg Oral BID Cynthia Dill MD   25 mg at 07/29/22 2027    tamsulosin (FLOMAX) capsule 0.4 mg  0.4 mg Oral Nightly Cynthia Dill MD   0.4 mg at 07/29/22 2027    vitamin D (CHOLECALCIFEROL) tablet 1,000 Units  1,000 Units Oral QAM Cynthia Dill MD   1,000 Units at 07/29/22 0910    insulin lispro (HUMALOG) injection vial 0-8 Units  0-8 Units SubCUTAneous TID WC Cynthia Dill MD   2 Units at 07/29/22 1120    insulin lispro (HUMALOG) injection vial 0-4 Units  0-4 Units SubCUTAneous Nightly Cynthia Dill MD        doxycycline (VIBRAMYCIN) 100 mg in dextrose 5 % 100 mL IVPB  100 mg IntraVENous Q12H Cynthia Dill MD   Stopped at 07/29/22 2338    cefTRIAXone (ROCEPHIN) 1,000 mg in sterile water 10 mL IV syringe  1,000 mg IntraVENous Q24H Cynthia Dill MD   1,000 mg at 07/29/22 0911       PRN Medications  sodium chloride flush, sodium chloride, ondansetron **OR** ondansetron, senna, acetaminophen **OR** acetaminophen, glucose, dextrose bolus **OR** dextrose bolus, glucagon (rDNA), dextrose, sodium chloride    Objective  Most Recent Recorded Vitals  BP (!) 168/86   Pulse 86   Temp 98 °F (36.7 °C) (Temporal) Resp 16   Ht 6' 3\" (1.905 m)   Wt 218 lb 6.4 oz (99.1 kg)   SpO2 100%   PF (!) 20 L/min   BMI 27.30 kg/m²   I/O last 3 completed shifts: In: 10 [I.V.:10]  Out: 100 [Urine:100]  No intake/output data recorded. Physical Exam:  General: AAO to person/place/time/purpose, NAD, no labored breathing  Eyes: conjunctivae/corneas clear, sclera non icteric  Skin: color/texture/turgor normal, no rashes or lesions  Lungs: CTAB, no retractions/use of accessory muscles, no vocal fremitus, no rhonchi, no crackle, no rales  Heart: regular rate, regular rhythm, no murmur  Abdomen: soft, NT, bowel sounds normal  Extremities: atraumatic, no edema  Neurologic: cranial nerves 2-12 grossly intact, no slurred speech    Most Recent Labs  Lab Results   Component Value Date    WBC 11.9 (H) 07/30/2022    HGB 12.6 07/30/2022    HCT 37.1 07/30/2022    PLT 95 (L) 07/30/2022     07/30/2022    K 4.4 07/30/2022     07/30/2022    CREATININE 1.2 07/30/2022    BUN 54 (H) 07/30/2022    CO2 21 (L) 07/30/2022    GLUCOSE 185 (H) 07/30/2022    ALT 39 07/30/2022    AST 50 (H) 07/30/2022    INR 1.3 07/28/2022    LABA1C 6.0 (H) 07/29/2022       CTA PULMONARY W CONTRAST   Final Result   No evidence for pulmonary embolism      Patchy confluent opacifications in the left mid and lower lung of a left   lower lobe bronchopneumonia parents without pleural effusion      No acute findings of the abdomen or pelvis. Pancolonic diverticulosis   without acute diverticulitis         CT ABDOMEN PELVIS W IV CONTRAST Additional Contrast? None   Final Result   No evidence for pulmonary embolism      Patchy confluent opacifications in the left mid and lower lung of a left   lower lobe bronchopneumonia parents without pleural effusion      No acute findings of the abdomen or pelvis. Pancolonic diverticulosis   without acute diverticulitis         XR CHEST PORTABLE   Final Result   Developing atelectasis and or infiltrate at the left lower lobe.          XR ANKLE LEFT (MIN 3 VIEWS)   Final Result   Heel spurs. 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]     H/O aortic valve replacement [Z95.2]     H/O mitral valve repair [Z98.890]     Pure hypercholesterolemia [E78.00]     Presence of permanent cardiac pacemaker [Z95.0]     Aortic stenosis, mild [I35.0]     Mild dilation of ascending aorta (HCC) [I77.810]     Near syncope [R55]     MGUS (monoclonal gammopathy of unknown significance) [D47.2]     Chronic gout [M1A.9XX0]     Type 2 diabetes mellitus without complication ([facility] [address] [Q20.0]     Hx of colonic polyps [Z86.010]     Hypertension [I10]          Plan  Hypoxic respiratory failure 2/2 SARS-CoV-2 infection  Inflammatory markers - crp trending up today  CTA neg for PE on 7/28  Lovenox  Decadron  Remdesivir   Anti tussives as needed  Ambulate with pulse ox today please   CRP moving down   L sided pneumonia superimposed bacterial pna   Procal 0.8  Rocephin and Doxy can be switched to PO Keflex and Doxy for DC today   Resp cx ordered  PT AM-PAC-- TBD   DVT prophylaxis   Code status Full   Medications, labs and imaging reviewed   Discharge plan: Planning for DC home today if continued improvement on PO abx     Electronically signed by Kamille Jiménez MD on 7/30/2022 at 8:41 AM    I can be reached through [facility].

## 2022-07-30 NOTE — PROGRESS NOTES
[facility] notified that pt will d/c home today. Spoke with Jaylin Higgins on call, they will see pt tomorrow.

## 2022-08-01 ENCOUNTER — CARE COORDINATION (OUTPATIENT)
Dept: CASE MANAGEMENT | Age: 82
End: 2022-08-01

## 2022-08-01 RX ORDER — HYDROCHLOROTHIAZIDE 25 MG/1
25 TABLET ORAL 3 TIMES DAILY
COMMUNITY

## 2022-08-01 NOTE — CARE COORDINATION
[facility] Transitions Initial Follow Up Call    Call within 2 business days of discharge: Yes    Patient: Devang Miguel Patient : 1940   MRN: [id number]  Reason for Admission: Acute respiratory failure d/t COVID-19+  Discharge Date: 22 RARS: Readmission Risk Score: 13.8      Last Discharge [facility]       Date Complaint Diagnosis Description Type Department Provider    22 Other; Fever; Positive For Covid-19; Fatigue Pneumonia of left lower lobe due to infectious organism . .. ED to Hosp-Admission (Discharged) (ADMITTED) JESSI Castillo MD; Fabrizio Rankin MD          Transitions of Care Initial Call    Was this an external facility discharge? No Discharge Facility: [address] [place] [address]    Challenges to be reviewed by the provider   Additional needs identified to be addressed with provider: No  none             Method of communication with provider : none    Advance Care Planning:   Does patient have an Advance Directive: reviewed and current. Care Transition Nurse contacted the patient by telephone to perform post hospital discharge assessment. Verified name and  with patient as identifiers. Provided introduction to self, and explanation of the CTN role. CTN reviewed discharge instructions, medical action plan and red flags with patient who verbalized understanding. Patient given an opportunity to ask questions and does not have any further questions or concerns at this time. Were discharge instructions available to patient? Yes. Reviewed appropriate site of care based on symptoms and resources available to patient including: PCP  Urgent care clinics  [facility]  When to call 911  Condition related references. The patient agrees to contact the PCP office for questions related to their healthcare. Medication reconciliation was performed with patient, who verbalizes understanding of administration of home medications.  Advised obtaining a 90-day supply of all daily and as-needed medications. Was patient discharged with a pulse oximeter? no    CTN provided contact information. Plan for follow-up call in 5-7 days based on severity of symptoms and risk factors. Plan for next call: symptom management-HAs cough/congestion improved?  follow up appointment-Did patient get scheduled for HFU appt with Dr. Queen Maciel (PCP)? Non-face-to-face services provided:  Scheduled appointment with PCP-CTN confirmed with patient he has a call into Dr. Queen Maciel (PCP) and awaiting a return call regarding scheduling HFU appt  Obtained and reviewed discharge summary and/or continuity of care documents  Education of patient/family/caregiver/guardian to support self-management-Discussed COVID/PNA/CHF zone tools and knowing when to seek medical attenton. Assessment and support for treatment adherence and medication management-Advised patient to take Keflex/Decadron/Doxycyline as directed until completely finished  Establishment or re-establishment of referrals-CTN confirmed with patient he had first visit yesterday 7/31/22 with [facility] [address] for [facility]. Care Transitions 24 Hour Call    Schedule Follow Up Appointment with PCP: Declined  Do you have a copy of your discharge instructions?: Yes  Do you have all of your prescriptions and are they filled?: Yes  Have you been contacted by a [facility] Pharmacist?: No  Have you scheduled your follow up appointment?: No  Do you have support at home?: Partner/Spouse/SO  Do you feel like you have everything you need to keep you well at home?: Yes  Care Transitions Interventions    Registered Dietician: Ernesto Laura with patient today 8/1/22 for hospital discharge/COVID-19+ follow up. Patient states having ongoing cough/congestion since discharge which is improving. States he is producing \"brownish\" colored phlegm. Denies any shortness of breath, chest pain or chest discomfort.  Patient states he has a pulse ox and monitors oxygen saturation and reports reading today is 98% on R/A. States utilizing incentive spirometer at home. CTA of chest obtained on admission noted to show the following below:     No evidence for pulmonary embolism       Patchy confluent opacifications in the left mid and lower lung of a left   lower lobe bronchopneumonia parents without pleural effusion       No acute findings of the abdomen or pelvis. Pancolonic diverticulosis   without acute diverticulitis     Patient denies any abdominal pain, LE swelling, sudden weight gain, nausea, vomiting, diarrhea, chills, fever or loss of taste/smell. Noted patient tested positive for COVID on 7/28/22. Patient states weight today is 186.5 lbs. States he follows a low sodium diet as part of HF management. Discussed CHF/PNA/COVID zone tools and knowing when to seek medical attention. Provided a complete review of home meds with patient who confirms he obtained all new meds ordered on discharge. Advised to take Keflex/Decadron/Doxycyline as directed until completely finished. Confirmed with patient [facility]. States awaiting a return call back from PCP office regarding scheduling HFU appt with Dr. Stella Barry. Denies any other complaints or concerns at this time. CTN will continue to follow for Care Transition. Follow Up  No future appointments.     DESMOND Aleman

## 2022-08-02 LAB
BLOOD CULTURE, ROUTINE: NORMAL
CULTURE, BLOOD 2: NORMAL

## 2022-08-08 ENCOUNTER — CARE COORDINATION (OUTPATIENT)
Dept: CASE MANAGEMENT | Age: 82
End: 2022-08-08

## 2022-08-08 NOTE — CARE COORDINATION
[facility] Transitions Follow Up Call    2022    Patient: Jaye Marks  Patient : 1940   MRN: [id number]  Reason for Admission: Acute respiratory failure d/t COVID-19+  Discharge Date: 22 RARS: Readmission Risk Score: 13.8    Attempted to contact patient today 22 for hospital discharge follow up sub call. Left message on home/mobile number requesting a return call back to CTN and provided contact information. CTN signing off if no return call.      Bam Omer, APRN

## 2022-09-26 NOTE — DISCHARGE INSTRUCTIONS
Patient seen for right lateral chest-axilla wound that had been taken to OR this weekend for I&D. Wound [id number]. was painful when old dressing removed. Held packing until pain med could be given and new dressing packed. Started silver fiber dressing. Packed easily, wide tract currently. Do not over pack. Will start daily and as drainage decreases, may decrease to every other day. Mother present. Will follow up mid week if he remains in acute care. Recommend home health nurse to assist with wound cares at home. Your information:  Name: Devang Miguel  : 1940      What to do after you leave the hospital:    Recommended diet: regular diet    Recommended activity: activity as tolerated        The following personal items were collected during your admission and were returned to you:    Belongings  Dental Appliances: None  Vision - Corrective Lenses: None  Hearing Aid: None  Clothing: Footwear, Pants, Shirt, Undergarments  Jewelry: None  Body Piercings Removed: N/A  Electronic Devices: Cell Phone  Weapons (Notify Protective Services/Security): None  Other Valuables: At home  Home Medications: None  Valuables Given To: Patient  Provide Name(s) of Who Valuable(s) Were Given To: patient  Responsible person(s) in the waiting room: yes  Patient approves for provider to speak to responsible person post operatively: Yes    Information obtained by:  By signing below, I understand that if any problems occur once I leave the hospital I am to contact my primary care provided or return to the emergency room. I understand and acknowledge receipt of the instructions indicated above.

## 2022-11-17 ENCOUNTER — APPOINTMENT (OUTPATIENT)
Dept: CT IMAGING | Age: 82
DRG: 948 | End: 2022-11-17
Payer: MEDICARE

## 2022-11-17 ENCOUNTER — APPOINTMENT (OUTPATIENT)
Dept: GENERAL RADIOLOGY | Age: 82
DRG: 948 | End: 2022-11-17
Payer: MEDICARE

## 2022-11-17 ENCOUNTER — HOSPITAL ENCOUNTER (INPATIENT)
Age: 82
LOS: 4 days | Discharge: HOME | DRG: 948 | End: 2022-11-21
Attending: EMERGENCY MEDICINE | Admitting: INTERNAL MEDICINE
Payer: MEDICARE

## 2022-11-17 DIAGNOSIS — R53.1 GENERALIZED WEAKNESS: Primary | ICD-10-CM

## 2022-11-17 DIAGNOSIS — R77.8 ELEVATED TROPONIN: ICD-10-CM

## 2022-11-17 LAB
ALBUMIN SERPL-MCNC: 4.2 G/DL (ref 3.5–5.2)
ALP BLD-CCNC: 120 U/L (ref 40–129)
ALT SERPL-CCNC: 35 U/L (ref 0–40)
ANION GAP SERPL CALCULATED.3IONS-SCNC: 11 MMOL/L (ref 7–16)
AST SERPL-CCNC: 45 U/L (ref 0–39)
BACTERIA: ABNORMAL /HPF
BASOPHILS ABSOLUTE: 0 E9/L (ref 0–0.2)
BASOPHILS RELATIVE PERCENT: 0 % (ref 0–2)
BILIRUB SERPL-MCNC: 0.4 MG/DL (ref 0–1.2)
BILIRUBIN URINE: NEGATIVE
BLOOD, URINE: ABNORMAL
BUN BLDV-MCNC: 26 MG/DL (ref 6–23)
CALCIUM SERPL-MCNC: 9.5 MG/DL (ref 8.6–10.2)
CHLORIDE BLD-SCNC: 103 MMOL/L (ref 98–107)
CLARITY: CLEAR
CO2: 21 MMOL/L (ref 22–29)
COLOR: YELLOW
CREAT SERPL-MCNC: 1.7 MG/DL (ref 0.7–1.2)
EKG ATRIAL RATE: 416 BPM
EKG Q-T INTERVAL: 434 MS
EKG QRS DURATION: 172 MS
EKG QTC CALCULATION (BAZETT): 471 MS
EKG R AXIS: -82 DEGREES
EKG T AXIS: 89 DEGREES
EKG VENTRICULAR RATE: 71 BPM
EOSINOPHILS ABSOLUTE: 0.13 E9/L (ref 0.05–0.5)
EOSINOPHILS RELATIVE PERCENT: 7 % (ref 0–6)
EPITHELIAL CELLS, UA: ABNORMAL /HPF
GFR SERPL CREATININE-BSD FRML MDRD: 39 ML/MIN/1.73
GLUCOSE BLD-MCNC: 93 MG/DL (ref 74–99)
GLUCOSE URINE: NEGATIVE MG/DL
HCT VFR BLD CALC: 30.9 % (ref 37–54)
HEMOGLOBIN: 10.4 G/DL (ref 12.5–16.5)
IMMATURE GRANULOCYTES #: 0.01 E9/L
IMMATURE GRANULOCYTES %: 0.5 % (ref 0–5)
INFLUENZA A: NOT DETECTED
INFLUENZA B: NOT DETECTED
KETONES, URINE: NEGATIVE MG/DL
LACTIC ACID, SEPSIS: 1.3 MMOL/L (ref 0.5–1.9)
LEUKOCYTE ESTERASE, URINE: NEGATIVE
LIPASE: 37 U/L (ref 13–60)
LYMPHOCYTES ABSOLUTE: 0.33 E9/L (ref 1.5–4)
LYMPHOCYTES RELATIVE PERCENT: 17.6 % (ref 20–42)
MCH RBC QN AUTO: 33.7 PG (ref 26–35)
MCHC RBC AUTO-ENTMCNC: 33.7 % (ref 32–34.5)
MCV RBC AUTO: 100 FL (ref 80–99.9)
MONOCYTES ABSOLUTE: 0.21 E9/L (ref 0.1–0.95)
MONOCYTES RELATIVE PERCENT: 11.2 % (ref 2–12)
NEUTROPHILS ABSOLUTE: 1.19 E9/L (ref 1.8–7.3)
NEUTROPHILS RELATIVE PERCENT: 63.7 % (ref 43–80)
NITRITE, URINE: NEGATIVE
PDW BLD-RTO: 14.6 FL (ref 11.5–15)
PH UA: 6 (ref 5–9)
PLATELET # BLD: 91 E9/L (ref 130–450)
PLATELET CONFIRMATION: NORMAL
PMV BLD AUTO: 11.4 FL (ref 7–12)
POTASSIUM SERPL-SCNC: 5 MMOL/L (ref 3.5–5)
PROSTATE SPECIFIC ANTIGEN: 0.07 NG/ML (ref 0–4)
PROTEIN UA: ABNORMAL MG/DL
RBC # BLD: 3.09 E12/L (ref 3.8–5.8)
RBC UA: ABNORMAL /HPF (ref 0–2)
SARS-COV-2 RNA, RT PCR: NOT DETECTED
SODIUM BLD-SCNC: 135 MMOL/L (ref 132–146)
SPECIFIC GRAVITY UA: 1.02 (ref 1–1.03)
TOTAL PROTEIN: 7.2 G/DL (ref 6.4–8.3)
TROPONIN, HIGH SENSITIVITY: 42 NG/L (ref 0–11)
TROPONIN, HIGH SENSITIVITY: 43 NG/L (ref 0–11)
TROPONIN, HIGH SENSITIVITY: 50 NG/L (ref 0–11)
UROBILINOGEN, URINE: 0.2 E.U./DL
WBC # BLD: 1.9 E9/L (ref 4.5–11.5)
WBC UA: ABNORMAL /HPF (ref 0–5)

## 2022-11-17 PROCEDURE — 2580000003 HC RX 258: Performed by: EMERGENCY MEDICINE

## 2022-11-17 PROCEDURE — 87040 BLOOD CULTURE FOR BACTERIA: CPT

## 2022-11-17 PROCEDURE — 81001 URINALYSIS AUTO W/SCOPE: CPT

## 2022-11-17 PROCEDURE — 96361 HYDRATE IV INFUSION ADD-ON: CPT

## 2022-11-17 PROCEDURE — 80053 COMPREHEN METABOLIC PANEL: CPT

## 2022-11-17 PROCEDURE — 74176 CT ABD & PELVIS W/O CONTRAST: CPT

## 2022-11-17 PROCEDURE — 2140000000 HC CCU INTERMEDIATE R&B

## 2022-11-17 PROCEDURE — 6360000002 HC RX W HCPCS: Performed by: INTERNAL MEDICINE

## 2022-11-17 PROCEDURE — 83690 ASSAY OF LIPASE: CPT

## 2022-11-17 PROCEDURE — 84484 ASSAY OF TROPONIN QUANT: CPT

## 2022-11-17 PROCEDURE — 84153 ASSAY OF PSA TOTAL: CPT

## 2022-11-17 PROCEDURE — 83605 ASSAY OF LACTIC ACID: CPT

## 2022-11-17 PROCEDURE — 87636 SARSCOV2 & INF A&B AMP PRB: CPT

## 2022-11-17 PROCEDURE — 93005 ELECTROCARDIOGRAM TRACING: CPT | Performed by: EMERGENCY MEDICINE

## 2022-11-17 PROCEDURE — 71045 X-RAY EXAM CHEST 1 VIEW: CPT

## 2022-11-17 PROCEDURE — 51798 US URINE CAPACITY MEASURE: CPT

## 2022-11-17 PROCEDURE — 6370000000 HC RX 637 (ALT 250 FOR IP): Performed by: INTERNAL MEDICINE

## 2022-11-17 PROCEDURE — 99285 EMERGENCY DEPT VISIT HI MDM: CPT

## 2022-11-17 PROCEDURE — 85025 COMPLETE CBC W/AUTO DIFF WBC: CPT

## 2022-11-17 PROCEDURE — 36415 COLL VENOUS BLD VENIPUNCTURE: CPT

## 2022-11-17 PROCEDURE — 96360 HYDRATION IV INFUSION INIT: CPT

## 2022-11-17 RX ORDER — TAMSULOSIN HYDROCHLORIDE 0.4 MG/1
0.4 CAPSULE ORAL NIGHTLY
Status: DISCONTINUED | OUTPATIENT
Start: 2022-11-17 | End: 2022-11-21 | Stop reason: HOSPADM

## 2022-11-17 RX ORDER — LISINOPRIL 20 MG/1
40 TABLET ORAL EVERY MORNING
Status: DISCONTINUED | OUTPATIENT
Start: 2022-11-18 | End: 2022-11-21 | Stop reason: HOSPADM

## 2022-11-17 RX ORDER — FAMOTIDINE 20 MG/1
20 TABLET, FILM COATED ORAL DAILY
Status: DISCONTINUED | OUTPATIENT
Start: 2022-11-17 | End: 2022-11-21 | Stop reason: HOSPADM

## 2022-11-17 RX ORDER — VITAMIN B COMPLEX
1000 TABLET ORAL EVERY MORNING
Status: DISCONTINUED | OUTPATIENT
Start: 2022-11-18 | End: 2022-11-21 | Stop reason: HOSPADM

## 2022-11-17 RX ORDER — ENOXAPARIN SODIUM 100 MG/ML
40 INJECTION SUBCUTANEOUS DAILY
Status: DISCONTINUED | OUTPATIENT
Start: 2022-11-17 | End: 2022-11-21 | Stop reason: HOSPADM

## 2022-11-17 RX ORDER — 0.9 % SODIUM CHLORIDE 0.9 %
500 INTRAVENOUS SOLUTION INTRAVENOUS ONCE
Status: COMPLETED | OUTPATIENT
Start: 2022-11-17 | End: 2022-11-17

## 2022-11-17 RX ORDER — ATORVASTATIN CALCIUM 40 MG/1
40 TABLET, FILM COATED ORAL NIGHTLY
Status: DISCONTINUED | OUTPATIENT
Start: 2022-11-17 | End: 2022-11-21 | Stop reason: HOSPADM

## 2022-11-17 RX ADMIN — ATORVASTATIN CALCIUM 40 MG: 40 TABLET, FILM COATED ORAL at 21:31

## 2022-11-17 RX ADMIN — FAMOTIDINE 20 MG: 20 TABLET ORAL at 21:31

## 2022-11-17 RX ADMIN — ENOXAPARIN SODIUM 40 MG: 100 INJECTION SUBCUTANEOUS at 21:30

## 2022-11-17 RX ADMIN — SODIUM CHLORIDE 500 ML: 9 INJECTION, SOLUTION INTRAVENOUS at 11:11

## 2022-11-17 RX ADMIN — TAMSULOSIN HYDROCHLORIDE 0.4 MG: 0.4 CAPSULE ORAL at 21:29

## 2022-11-17 RX ADMIN — METOPROLOL TARTRATE 25 MG: 25 TABLET, FILM COATED ORAL at 21:28

## 2022-11-17 ASSESSMENT — PAIN - FUNCTIONAL ASSESSMENT: PAIN_FUNCTIONAL_ASSESSMENT: NONE - DENIES PAIN

## 2022-11-17 NOTE — ED NOTES
Pt had large episode of soft brown stool, partially in his brief, but in the garcia mostly.      Ayah Jj, RN  11/17/22 [id number]

## 2022-11-17 NOTE — ED PROVIDER NOTES
Department of Emergency Medicine   ED  Provider Note  Admit Date/RoomTime: 11/17/2022  9:53 AM  ED Room: 6515/0019-X          History of Present Illness:  11/17/22, Time: 9:57 AM EST  Chief Complaint   Patient presents with    Fatigue     Generalized weakness, fell last night, did not hit head, no loc, left elbow pain, currently being treated for UTI                Durga Dye is a 78 y.o. male presenting to the ED for weakness and fatigue, beginning 2 days ago. The complaint has been persistent, mild in severity, and worsened by nothing. Patient states he was diagnosed with a UTI, has been on Bactrim over the past 5 days. Over the past 2 days patient has been feeling very weak and fatigued. According to his wife he has not been eating and drinking well. This morning patient states that he felt weak and fatigued in the kitchen, then laid onto the ground. He states he did not strike his head, there was no loss consciousness. He states he did not have any lightheadedness or dizziness prior to the event, simply stated that his legs felt rather weak and fatigued. There has been no fever. Patient continues to have frequent urination but denies any abdominal pain, no nausea or vomiting, no diarrhea. He denies any fevers or cough, no chest pain or shortness of breath. Review of Systems:   Pertinent positives and negatives are stated within HPI, all other systems reviewed and are negative.        --------------------------------------------- PAST HISTORY ---------------------------------------------  Past Medical History:  has a past medical history of Diabetes mellitus ([address] Erectile dysfunction, Gout, Hypertension, and Vitamin D deficiency. Past Surgical History:  has a past surgical history that includes Appendectomy (1047); Tonsillectomy; Cataract removal with implant (Left, 6/6/13); hernia repair (1970); and Cataract removal with implant (10/31/13).     Social History:  reports that he has quit smoking. He quit smokeless tobacco use about 52 years ago. He reports current alcohol use. He reports that he does not use drugs. Family History: family history is not on file. . Unless otherwise noted, family history is non contributory    The patients home medications have been reviewed. Allergies: Patient has no known allergies. ---------------------------------------------------PHYSICAL EXAM--------------------------------------    Constitutional/General: Alert and oriented x3  Head: Normocephalic and atraumatic  Eyes: PERRL, EOMI, sclera non icteric  Mouth: Oropharynx clear, handling secretions, no trismus, no asymmetry of the posterior oropharynx or uvular edema  Neck: Supple, full ROM, no stridor, no meningeal signs  Respiratory: Lungs clear to auscultation bilaterally, no wheezes, rales, or rhonchi. Not in respiratory distress  Cardiovascular:  Regular rate. Regular rhythm. No murmurs, no aortic murmurs, no gallops, or rubs. 2+ distal pulses. Equal extremity pulses. Chest: No chest wall tenderness  GI:  Abdomen Soft, Non tender, Non distended. No rebound, guarding, or rigidity. No pulsatile masses. Musculoskeletal: Moves all extremities x 4. Warm and well perfused, no clubbing, cyanosis, or edema. Capillary refill <3 seconds  Integument: skin warm and dry. No rashes. Neurologic: GCS 15, no focal deficits, symmetric strength 5/5 in the upper and lower extremities bilaterally  Psychiatric: Normal Affect          -------------------------------------------------- RESULTS -------------------------------------------------  I have personally reviewed all laboratory and imaging results for this patient. Results are listed below.      LABS: (Lab results interpreted by me)  Results for orders placed or performed during the hospital encounter of 11/17/22   Culture, Blood 1    Specimen: Blood   Result Value Ref Range    Blood Culture, Routine 24 Hours no growth    Culture, Blood 2    Specimen: Blood Result Value Ref Range    Culture, Blood 2 24 Hours no growth    COVID-19 & Influenza Combo    Specimen: Nasopharyngeal Swab   Result Value Ref Range    SARS-CoV-2 RNA, RT PCR NOT DETECTED NOT DETECTED    INFLUENZA A NOT DETECTED NOT DETECTED    INFLUENZA B NOT DETECTED NOT DETECTED   Lactate, Sepsis   Result Value Ref Range    Lactic Acid, Sepsis 1.3 0.5 - 1.9 mmol/L   CBC with Auto Differential   Result Value Ref Range    WBC 1.9 (L) 4.5 - 11.5 E9/L    RBC 3.09 (L) 3.80 - 5.80 E12/L    Hemoglobin 10.4 (L) 12.5 - 16.5 g/dL    Hematocrit 30.9 (L) 37.0 - 54.0 %    .0 (H) 80.0 - 99.9 fL    MCH 33.7 26.0 - 35.0 pg    MCHC 33.7 32.0 - 34.5 %    RDW 14.6 11.5 - 15.0 fL    Platelets 91 (L) 366 - 450 E9/L    MPV 11.4 7.0 - 12.0 fL    Neutrophils % 63.7 43.0 - 80.0 %    Immature Granulocytes % 0.5 0.0 - 5.0 %    Lymphocytes % 17.6 (L) 20.0 - 42.0 %    Monocytes % 11.2 2.0 - 12.0 %    Eosinophils % 7.0 (H) 0.0 - 6.0 %    Basophils % 0.0 0.0 - 2.0 %    Neutrophils Absolute 1.19 (L) 1.80 - 7.30 E9/L    Immature Granulocytes # 0.01 E9/L    Lymphocytes Absolute 0.33 (L) 1.50 - 4.00 E9/L    Monocytes Absolute 0.21 0.10 - 0.95 E9/L    Eosinophils Absolute 0.13 0.05 - 0.50 E9/L    Basophils Absolute 0.00 0.00 - 0.20 E9/L   CMP   Result Value Ref Range    Sodium 135 132 - 146 mmol/L    Potassium 5.0 3.5 - 5.0 mmol/L    Chloride 103 98 - 107 mmol/L    CO2 21 (L) 22 - 29 mmol/L    Anion Gap 11 7 - 16 mmol/L    Glucose 93 74 - 99 mg/dL    BUN 26 (H) 6 - 23 mg/dL    Creatinine 1.7 (H) 0.7 - 1.2 mg/dL    Est, Glom Filt Rate 39 >=60 mL/min/1.73    Calcium 9.5 8.6 - 10.2 mg/dL    Total Protein 7.2 6.4 - 8.3 g/dL    Albumin 4.2 3.5 - 5.2 g/dL    Total Bilirubin 0.4 0.0 - 1.2 mg/dL    Alkaline Phosphatase 120 40 - 129 U/L    ALT 35 0 - 40 U/L    AST 45 (H) 0 - 39 U/L   Troponin   Result Value Ref Range    Troponin, High Sensitivity 50 (H) 0 - 11 ng/L   Lipase   Result Value Ref Range    Lipase 37 13 - 60 U/L   Urinalysis with Microscopic   Result Value Ref Range    Color, UA Yellow Straw/Yellow    Clarity, UA Clear Clear    Glucose, Ur Negative Negative mg/dL    Bilirubin Urine Negative Negative    Ketones, Urine Negative Negative mg/dL    Specific Gravity, UA 1.020 1.005 - 1.030    Blood, Urine TRACE-INTACT Negative    pH, UA 6.0 5.0 - 9.0    Protein, UA TRACE Negative mg/dL    Urobilinogen, Urine 0.2 <2.0 E.U./dL    Nitrite, Urine Negative Negative    Leukocyte Esterase, Urine Negative Negative    WBC, UA 0-1 0 - 5 /HPF    RBC, UA 0-1 0 - 2 /HPF    Epithelial Cells, UA RARE /HPF    Bacteria, UA RARE (A) None Seen /HPF   Platelet Confirmation   Result Value Ref Range    Platelet Confirmation CONFIRMED    Troponin   Result Value Ref Range    Troponin, High Sensitivity 43 (H) 0 - 11 ng/L   Troponin   Result Value Ref Range    Troponin, High Sensitivity 42 (H) 0 - 11 ng/L   PSA, Diagnostic   Result Value Ref Range    PSA 0.07 0.00 - 4.00 ng/mL   CBC with Auto Differential   Result Value Ref Range    WBC 3.8 (L) 4.5 - 11.5 E9/L    RBC 3.04 (L) 3.80 - 5.80 E12/L    Hemoglobin 10.2 (L) 12.5 - 16.5 g/dL    Hematocrit 30.9 (L) 37.0 - 54.0 %    .6 (H) 80.0 - 99.9 fL    MCH 33.6 26.0 - 35.0 pg    MCHC 33.0 32.0 - 34.5 %    RDW 14.5 11.5 - 15.0 fL    Platelets 87 (L) 635 - 450 E9/L    MPV 11.6 7.0 - 12.0 fL    Neutrophils % 82.1 (H) 43.0 - 80.0 %    Lymphocytes % 9.8 (L) 20.0 - 42.0 %    Monocytes % 5.4 2.0 - 12.0 %    Eosinophils % 2.7 0.0 - 6.0 %    Basophils % 0.5 0.0 - 2.0 %    Neutrophils Absolute 3.12 1.80 - 7.30 E9/L    Lymphocytes Absolute 0.38 (L) 1.50 - 4.00 E9/L    Monocytes Absolute 0.19 0.10 - 0.95 E9/L    Eosinophils Absolute 0.10 0.05 - 0.50 E9/L    Basophils Absolute 0.00 0.00 - 0.20 E9/L    Anisocytosis 1+     Poikilocytes 2+     Schistocytes 1+     Ovalocytes 2+    Comprehensive Metabolic Panel   Result Value Ref Range    Sodium 134 132 - 146 mmol/L    Potassium 4.6 3.5 - 5.0 mmol/L    Chloride 102 98 - 107 mmol/L    CO2 18 (L) 22 - 29 mmol/L    Anion Gap 14 7 - 16 mmol/L    Glucose 101 (H) 74 - 99 mg/dL    BUN 24 (H) 6 - 23 mg/dL    Creatinine 1.4 (H) 0.7 - 1.2 mg/dL    Est, Glom Filt Rate 49 >=60 mL/min/1.73    Calcium 8.9 8.6 - 10.2 mg/dL    Total Protein 6.7 6.4 - 8.3 g/dL    Albumin 3.7 3.5 - 5.2 g/dL    Total Bilirubin 0.6 0.0 - 1.2 mg/dL    Alkaline Phosphatase 109 40 - 129 U/L    ALT 34 0 - 40 U/L    AST 42 (H) 0 - 39 U/L   Platelet Confirmation   Result Value Ref Range    Platelet Confirmation CONFIRMED    EKG 12 Lead   Result Value Ref Range    Ventricular Rate 71 BPM    Atrial Rate 416 BPM    QRS Duration 172 ms    Q-T Interval 434 ms    QTc Calculation (Bazett) 471 ms    R Axis -82 degrees    T Axis 89 degrees   EKG 12 Lead   Result Value Ref Range    Ventricular Rate 61 BPM    Atrial Rate 62 BPM    QRS Duration 176 ms    Q-T Interval 468 ms    QTc Calculation (Bazett) 471 ms    R Axis -80 degrees    T Axis 81 degrees   ,       RADIOLOGY:  Interpreted by Radiologist unless otherwise specified  CT ABDOMEN PELVIS WO CONTRAST Additional Contrast? None   Final Result   1. No acute abnormality is seen in the abdomen or the pelvis. 2. Severe distal colonic diverticulosis without diverticulitis. 3. Mild cardiomegaly. XR CHEST PORTABLE   Final Result   1. There is no acute cardiopulmonary disease.                EKG Interpretation  Interpreted by emergency department physician, Dr. Brendon Mcgee    Date of EK22  Time: 1012    Rhythm: paced  Rate: 71  Axis: normal  Conduction: normal  ST Segments: no acute change  T Waves: no acute change    Clinical Impression: No findings suggestive of acute ischemia or injury  Comparison to prior EKG: stable as compared to patient's most recent EKG    EKG Interpretation  Interpreted by emergency department physician, Dr. Brendon Mcgee    Date of EK22  Time: 1511    Rhythm: paced  Rate: 61  Axis: normal  Conduction: normal  ST Segments: no acute change  T Waves: no acute change    Clinical Impression: No findings suggestive of acute ischemia or injury  Comparison to prior EKG: stable as compared to patient's most recent EKG      ------------------------- NURSING NOTES AND VITALS REVIEWED ---------------------------   The nursing notes within the ED encounter and vital signs as below have been reviewed by myself  BP (!) 124/59   Pulse 63   Temp 99.5 °F (37.5 °C) (Oral)   Resp 12   Ht 6' 1\" (1.854 m)   Wt 185 lb 7 oz (84.1 kg)   SpO2 97%   BMI 24.47 kg/m²     Oxygen Saturation Interpretation: Normal    The patients available past medical records and past encounters were reviewed. ------------------------------ ED COURSE/MEDICAL DECISION MAKING----------------------  Medications   atorvastatin (LIPITOR) tablet 40 mg (40 mg Oral Given 11/17/22 2131)   famotidine (PEPCID) tablet 20 mg (20 mg Oral Given 11/18/22 0858)   lisinopril (PRINIVIL;ZESTRIL) tablet 40 mg (40 mg Oral Given 11/18/22 0857)   metFORMIN (GLUCOPHAGE) tablet 500 mg (500 mg Oral Given 11/18/22 0858)   metoprolol tartrate (LOPRESSOR) tablet 25 mg (25 mg Oral Given 11/18/22 0858)   tamsulosin (FLOMAX) capsule 0.4 mg (0.4 mg Oral Given 11/17/22 2129)   vitamin D (CHOLECALCIFEROL) tablet 1,000 Units (1,000 Units Oral Given 11/18/22 0857)   enoxaparin (LOVENOX) injection 40 mg (40 mg SubCUTAneous Given 11/18/22 0858)   0.9 % sodium chloride bolus (0 mLs IntraVENous Stopped 11/17/22 1312)           The cardiac monitor revealed paced rhythm with a heart rate in the 70s as interpreted by me. The cardiac monitor was ordered secondary to the patient's chest pain and to monitor for patient for dysrhythmia. CPT [id number]           Medical Decision Making:     I, Dr. Saira Thomas, am the primary provider of record    40-year-old male presenting with generalized weakness and fatigue. Patient has been taking Bactrim for UTI, diagnosed 5 days ago.   Concern for possible all ongoing UTI, lower suspicion for bacteremia as patient appears well but is possible. Concern for possible side effects of Bactrim including possible acute kidney injury. Low suspicion for ACS or cardiac etiology with his weakness, but again is possible. He arrives hemodynamically stable and well-appearing, afebrile with no increased work of breathing or hypoxia. EKG shows no findings suggestive of acute ischemia or injury, initial troponin 50, repeat of 43 and 42. Urinalysis shows no signs of infection. Leukopenia 1.9, hemoglobin 10.4 which is low normal for patient's baseline. Metabolic panel is within acceptable limits. Lactic acid 1.3. COVID and influenza testing are negative. Chest x-ray is unremarkable. With the patient's weakness and elevated troponin, would consider ACS, repeat EKG shows no changes. Patient is comfortable with admission and further management. He remained hemodynamically stable during ED course. Re-Evaluations: This patient's ED course included: a personal history and physicial examination, re-evaluation prior to disposition, multiple bedside re-evaluations, IV medications, cardiac monitoring, and continuous pulse oximetry    This patient has remained hemodynamically stable and been closely monitored during their ED course. Counseling: The emergency provider has spoken with the patient and spouse/SO and discussed todays results, in addition to providing specific details for the plan of care and counseling regarding the diagnosis and prognosis. Questions are answered at this time and they are agreeable with the plan.       --------------------------------- IMPRESSION AND DISPOSITION ---------------------------------    IMPRESSION  1. Generalized weakness    2. Elevated troponin        DISPOSITION  Disposition: Admit to telemetry  Patient condition is stable        NOTE: This report was transcribed using voice recognition software.  Every effort was made to ensure accuracy; however, inadvertent computerized transcription errors may be present        Roney Avila DO  11/18/22 3728

## 2022-11-17 NOTE — ED NOTES
To ED via EMS from home c/o generalized total body weakness x 2 days that has been worsening. States last night he felt like he was going to fall and lowered himself to ground so that he would not fall. He states he did hit his left elbow while lowering himself. Denies hitting his head in the process. Pt also has been being treated for a recent UTI, has been taking bactrim. States urinary frequency and burning with urinating. Pt denies N/V/D, denies CP/SOB, abd pain. Pt does have chronic back pain that is no worse than typical for him. States recent covid infection about 2 months ago. Pt is A&Ox4. Neuro intact.       Bob Camejo RN  11/17/22 2857

## 2022-11-18 LAB
ALBUMIN SERPL-MCNC: 3.7 G/DL (ref 3.5–5.2)
ALP BLD-CCNC: 109 U/L (ref 40–129)
ALT SERPL-CCNC: 34 U/L (ref 0–40)
ANION GAP SERPL CALCULATED.3IONS-SCNC: 14 MMOL/L (ref 7–16)
ANISOCYTOSIS: ABNORMAL
AST SERPL-CCNC: 42 U/L (ref 0–39)
BASOPHILS ABSOLUTE: 0 E9/L (ref 0–0.2)
BASOPHILS RELATIVE PERCENT: 0.5 % (ref 0–2)
BILIRUB SERPL-MCNC: 0.6 MG/DL (ref 0–1.2)
BUN BLDV-MCNC: 24 MG/DL (ref 6–23)
CALCIUM SERPL-MCNC: 8.9 MG/DL (ref 8.6–10.2)
CHLORIDE BLD-SCNC: 102 MMOL/L (ref 98–107)
CO2: 18 MMOL/L (ref 22–29)
CREAT SERPL-MCNC: 1.4 MG/DL (ref 0.7–1.2)
EKG ATRIAL RATE: 62 BPM
EKG Q-T INTERVAL: 468 MS
EKG QRS DURATION: 176 MS
EKG QTC CALCULATION (BAZETT): 471 MS
EKG R AXIS: -80 DEGREES
EKG T AXIS: 81 DEGREES
EKG VENTRICULAR RATE: 61 BPM
EOSINOPHILS ABSOLUTE: 0.1 E9/L (ref 0.05–0.5)
EOSINOPHILS RELATIVE PERCENT: 2.7 % (ref 0–6)
GFR SERPL CREATININE-BSD FRML MDRD: 49 ML/MIN/1.73
GLUCOSE BLD-MCNC: 101 MG/DL (ref 74–99)
HCT VFR BLD CALC: 30.9 % (ref 37–54)
HEMOGLOBIN: 10.2 G/DL (ref 12.5–16.5)
LYMPHOCYTES ABSOLUTE: 0.38 E9/L (ref 1.5–4)
LYMPHOCYTES RELATIVE PERCENT: 9.8 % (ref 20–42)
MCH RBC QN AUTO: 33.6 PG (ref 26–35)
MCHC RBC AUTO-ENTMCNC: 33 % (ref 32–34.5)
MCV RBC AUTO: 101.6 FL (ref 80–99.9)
MONOCYTES ABSOLUTE: 0.19 E9/L (ref 0.1–0.95)
MONOCYTES RELATIVE PERCENT: 5.4 % (ref 2–12)
NEUTROPHILS ABSOLUTE: 3.12 E9/L (ref 1.8–7.3)
NEUTROPHILS RELATIVE PERCENT: 82.1 % (ref 43–80)
OVALOCYTES: ABNORMAL
PDW BLD-RTO: 14.5 FL (ref 11.5–15)
PLATELET # BLD: 87 E9/L (ref 130–450)
PLATELET CONFIRMATION: NORMAL
PMV BLD AUTO: 11.6 FL (ref 7–12)
POIKILOCYTES: ABNORMAL
POTASSIUM SERPL-SCNC: 4.6 MMOL/L (ref 3.5–5)
RBC # BLD: 3.04 E12/L (ref 3.8–5.8)
SCHISTOCYTES: ABNORMAL
SODIUM BLD-SCNC: 134 MMOL/L (ref 132–146)
TOTAL PROTEIN: 6.7 G/DL (ref 6.4–8.3)
WBC # BLD: 3.8 E9/L (ref 4.5–11.5)

## 2022-11-18 PROCEDURE — 93280 PM DEVICE PROGR EVAL DUAL: CPT | Performed by: INTERNAL MEDICINE

## 2022-11-18 PROCEDURE — 2140000000 HC CCU INTERMEDIATE R&B

## 2022-11-18 PROCEDURE — 97165 OT EVAL LOW COMPLEX 30 MIN: CPT

## 2022-11-18 PROCEDURE — 85025 COMPLETE CBC W/AUTO DIFF WBC: CPT

## 2022-11-18 PROCEDURE — 80053 COMPREHEN METABOLIC PANEL: CPT

## 2022-11-18 PROCEDURE — 6370000000 HC RX 637 (ALT 250 FOR IP): Performed by: INTERNAL MEDICINE

## 2022-11-18 PROCEDURE — 97535 SELF CARE MNGMENT TRAINING: CPT

## 2022-11-18 PROCEDURE — 97161 PT EVAL LOW COMPLEX 20 MIN: CPT

## 2022-11-18 PROCEDURE — 6360000002 HC RX W HCPCS: Performed by: INTERNAL MEDICINE

## 2022-11-18 PROCEDURE — 97530 THERAPEUTIC ACTIVITIES: CPT

## 2022-11-18 PROCEDURE — 36415 COLL VENOUS BLD VENIPUNCTURE: CPT

## 2022-11-18 PROCEDURE — 99223 1ST HOSP IP/OBS HIGH 75: CPT | Performed by: INTERNAL MEDICINE

## 2022-11-18 RX ADMIN — FAMOTIDINE 20 MG: 20 TABLET ORAL at 08:58

## 2022-11-18 RX ADMIN — ENOXAPARIN SODIUM 40 MG: 100 INJECTION SUBCUTANEOUS at 08:58

## 2022-11-18 RX ADMIN — TAMSULOSIN HYDROCHLORIDE 0.4 MG: 0.4 CAPSULE ORAL at 20:19

## 2022-11-18 RX ADMIN — METFORMIN HYDROCHLORIDE 500 MG: 500 TABLET ORAL at 08:58

## 2022-11-18 RX ADMIN — LISINOPRIL 40 MG: 20 TABLET ORAL at 08:57

## 2022-11-18 RX ADMIN — Medication 1000 UNITS: at 08:57

## 2022-11-18 RX ADMIN — ATORVASTATIN CALCIUM 40 MG: 40 TABLET, FILM COATED ORAL at 20:19

## 2022-11-18 RX ADMIN — METOPROLOL TARTRATE 25 MG: 25 TABLET, FILM COATED ORAL at 20:19

## 2022-11-18 RX ADMIN — METOPROLOL TARTRATE 25 MG: 25 TABLET, FILM COATED ORAL at 08:58

## 2022-11-18 NOTE — CONSULTS
[facility],[address] and [address] [facility] Electrophysiology  Consultation Report  PATIENT: Chay Figueroa  MEDICAL RECORD NUMBER: [id number]  DATE OF SERVICE:  11/18/2022  ATTENDING ELECTROPHYSIOLOGIST: Betsey Rossi MD  PRIMARY ELECTROPHYSIOLOGIST: Betsey Rossi MD  REFERRING PHYSICIAN: No ref. provider found and Antonina Freeman MD  CHIEF COMPLAINT: Generalized weakness    HPI: This is a 78 y.o. male with a history of   Patient Active Problem List   Diagnosis    Hypertension    ED (erectile dysfunction)    Type 2 diabetes mellitus without complication (HCC)    Hx of colonic polyps    Chronic gout    MGUS (monoclonal gammopathy of unknown significance)    Near syncope    Aortic stenosis, mild    Mild dilation of ascending aorta (HCC)    Presence of permanent cardiac pacemaker    Aortocoronary bypass status    H/O mitral valve repair    H/O aortic valve replacement    Pure hypercholesterolemia    Rectal bleed    GI bleed    Acute respiratory failure due to COVID-19 [facility])    Generalized weakness   who presented to the hospital complaining of generalized weakness. The patient has history of severe AS s/p AVR on 6/2020, coronary artery disease status post redo CABG x 2 on 6/2020, complete AV block status post pacemaker implantation 6/2020, permament atrial fibrillation status post HAKEEM clip 6/2020, hypertension, diabetes mellitus, asbestos exposure, MGUS, prostate cancer status post radiation seeds 8/2019 and gout. The patient's [address] was discontinued due to persistent hematuria and post HAKEEM clip. He states that he was doing well except for the past 6 months that he experienced fatigue and weakness. Yesterday while he was in the kitchen, he felt weak and fatigue. He lowered himself down to the floor and did not fell down. He denies any dizziness or syncope at that time. He reports occasional lightheadedness when getting up too quick at time.  The patient denies any chest pain, dyspnea, palpitations, orthopnea or paroxysmal nocturnal dyspnea. Cardiac electrophysiology service is consulted for presyncope. Patient Active Problem List    Diagnosis Date Noted    Generalized weakness 11/17/2022     Priority: Medium    Acute respiratory failure due to COVID-19 [facility]) 07/28/2022     Priority: Medium    Rectal bleed 10/09/2020    GI bleed 10/09/2020    Presence of permanent cardiac pacemaker 06/24/2020    Aortocoronary bypass status 06/24/2020    H/O mitral valve repair 06/24/2020    H/O aortic valve replacement 06/24/2020    Pure hypercholesterolemia 06/24/2020    Aortic stenosis, mild 05/07/2019    Mild dilation of ascending aorta ([facility] [address] 05/07/2019    Near syncope 05/05/2019    MGUS (monoclonal gammopathy of unknown significance) 03/28/2017    Chronic gout 09/28/2016    Type 2 diabetes mellitus without complication ([facility] [address] 37/02/7519    Hx of colonic polyps 04/17/2016    Hypertension 04/14/2016    ED (erectile dysfunction) 04/14/2016       Past Medical History:   Diagnosis Date    Diabetes mellitus ([facility] [address]     states glucose is running normal    Erectile dysfunction     Gout     Hypertension     states doing well    Vitamin D deficiency        History reviewed. No pertinent family history.     Social History     Tobacco Use    Smoking status: Former     Years: 15.00     Types: Cigarettes    Smokeless tobacco: Former     Quit date: 10/28/1970   Substance Use Topics    Alcohol use: Yes     Comment: 1 drink daily       Current Facility-Administered Medications   Medication Dose Route Frequency Provider Last Rate Last Admin    atorvastatin (LIPITOR) tablet 40 mg  40 mg Oral Nightly Deedee Deleon MD   40 mg at 11/17/22 2131    famotidine (PEPCID) tablet 20 mg  20 mg Oral Daily Deedee Deleon MD   20 mg at 11/18/22 0858    lisinopril (PRINIVIL;ZESTRIL) tablet 40 mg  40 mg Oral QAM Deedee Deleon MD   40 mg at 11/18/22 0857    metFORMIN (GLUCOPHAGE) tablet 500 mg  500 mg Oral QAM Deedee Deleon MD   500 mg at 11/18/22 0858    metoprolol tartrate (LOPRESSOR) tablet 25 mg  25 mg Oral BID Manuel Graff MD   25 mg at 11/18/22 0858    tamsulosin (FLOMAX) capsule 0.4 mg  0.4 mg Oral Nightly Manuel Graff MD   0.4 mg at 11/17/22 2129    vitamin D (CHOLECALCIFEROL) tablet 1,000 Units  1,000 Units Oral QAM Manuel Graff MD   1,000 Units at 11/18/22 0857    enoxaparin (LOVENOX) injection 40 mg  40 mg SubCUTAneous Daily Manuel Graff MD   40 mg at 11/18/22 0858        No Known Allergies    ROS:   Constitutional: Negative for fever. Positive for activity change and appetite change. HENT: Negative for epistaxis. Eyes: Negative for diploplia, blurred vision. Respiratory: Negative for cough, chest tightness, shortness of breath and wheezing. Cardiovascular: pertinent positives in HPI  Gastrointestinal: Negative for abdominal pain and blood in stool. All other review of systems are negative     PHYSICAL EXAM:   Vitals:    11/17/22 1942 11/17/22 1945 11/17/22 2128 11/18/22 0830   BP: (!) 189/78  (!) 189/74 (!) 124/59   Pulse: 82  88 63   Resp: 16   12   Temp: 98.6 °F (37 °C)   99.5 °F (37.5 °C)   TempSrc: Temporal   Oral   SpO2: 99%   97%   Weight:  185 lb 7 oz (84.1 kg)     Height:  6' 1\" (1.854 m)        Constitutional: Well-developed, no acute distress  Eyes: conjunctivae normal, no xanthelasma   Ears, Nose, Throat: oral mucosa moist, no cyanosis   CV: no JVD. Regular rate and rhythm. No murmurs, rubs, or gallops. PMI is nondisplaced  Lungs: clear to auscultation bilaterally, normal respiratory effort without used of accessory muscles  Abdomen: soft, non-tender, bowel sounds present, no masses or hepatomegaly   Musculoskeletal: no digital clubbing, no edema   Skin: warm, no rashes   Pacemaker site: well healed.  No erosion, infection or migration    I have personally reviewed the laboratory, cardiac diagnostic and radiographic testing as outlined below:    Data:    Recent Labs     11/17/22  1030 11/18/22  0652   WBC 1. 9* 3.8*   HGB 10.4* 10.2*   HCT 30.9* 30.9*   PLT 91* 87*     Recent Labs     11/17/22  1030 11/18/22  0652    134   K 5.0 4.6    102   CO2 21* 18*   BUN 26* 24*   CREATININE 1.7* 1.4*   CALCIUM 9.5 8.9      Lab Results   Component Value Date/Time    MG 1.9 05/05/2019 02:23 PM     No results for input(s): TSH in the last 72 hours. No results for input(s): INR in the last 72 hours. CXR 11/17/22:   FINDINGS:   The cardiac silhouette is within normals. Postop sternotomy changes are   noted. There is a dual lead cardiac pacer on the left       The lungs are clear. There is no focal infiltrate or effusion. Impression   1. There is no acute cardiopulmonary disease. Telemetry 11/18/22 : AF with V paced    EKG 11/17/21: AF with V paced 61 bpm. Please see scan in Cardiology. Echocardiogram 6/7/22:   CONCLUSIONS:   - Technically difficult exam due to body habitus. - Exam indication: S/p AVR, MVr, CABG, ascending aorta replacement   - The left ventricle is normal in size. Left ventricular systolic function is   normal. EF = 63 ± 5% (2D biplane) 3D volumes and LV strain not reported due to   poor tracking.   - The right ventricle is normal in size. Right ventricular systolic function is   mildly decreased. - The left atrial cavity is severely dilated. - Post mitral valve repair. There is mild (1+ - 2+) mitral valve regurgitation. The peak gradient is 19 mmHg and the mean gradient is 4 mmHg. Prior MV pk/mn   gradients of 8/4 mmHg. Today's gradients obtained at a HR of 60bpm.   - Macias Perimount prosthetic aortic valve (size #25). There is no aortic valve   regurgitation. The peak gradient is 12 mmHg, the mean gradient is 7 mmHg and the   dimensionless valve index is 0.50. Prior AV pk/mn gradients of 10/4 mmHg. - Exam was compared with the prior CC echocardiographic exam performed on   6/4/2021. More MR today.        Electronically signed by Durga Jack MD on 6/27/2022 at 11:51:58 AM     Cardiac Catheterization 6/3/20:          Site Id: [facility]   Lab #: [facility] HVI Cath Lab 2   Study Date: 2020   Start Time: 2020 10:52:32 AM   End Time: 2020 11:39:15 AM     Physician Name   Alejandrina Stephens M.D.     Nursing/Tech   Oris Hansen, L. RT(R) Gweneth Rong) Dois Lesches R.N. Kathlene Potters R.N. Suzette Coffer R.N.     +-------------------+   PATIENT INFORMATION   +-------------------+     Name:        MR. Ag Hickey.   MRN:         [id number]   Accession #: [id number]   :         1940   Age:         78 years   Gender:      M   Height: 75 in / 190 cm   Weight: 204.00 lb / 92.53 kg   BMI:    25.63 kg/m²   BSA:    2.21 m²     Allergies: NKDA : NO KNOWN ALLERGIES     +------------------------------+   CLINICAL HISTORY/INDICATION(s)   +------------------------------+     Dyspnea. Procedural Status: Elective     CAD Presentation: Symptoms Unlikely to be Ischemic     Angina Classification (within 2 weeks): No Angina     No Heart Failure     Mr. Juan Starkey is an 78 YOM w PMH that is most significant for severe AS who presents   to us for a pre operative LHC. Access Point        Sheath Size Hemostasis Method   Right Radial Artery 6F Short    Radial TR band       +-------------------+   DIAGNOSTIC FINDINGS   +-------------------+     Coronary Anatomy: Right Dominant     Injection Site(s): Left Main Coronary Artery and Right Coronary Artery     LMT: _ The LMT is normal.     LAD:   _ The proximal LAD is narrowed 50 % - focal disease. LCX: _ The Circumflex has mild luminal irregularities. RAMUS: _ The Ramus is Absent. RCA:   _ The right posterior descending artery RCA is narrowed 90 % - focal disease. _ The 1st posterolateral RCA is narrowed 50 % - focal disease. Additional Comment: The RCA is a large and dominant vessel with 90% disease in   the proximal segment of the PDA. The PL segment also has 50% stenosis.      +------------+   HEMODYNAMICS +------------+       +---------------+   IMPRESSION/PLAN   +---------------+     Impression:1. Moderate stenosis (50%) of the proximal LAD. Severe stenosis (90%)   of the PDA and moderate stenosis of the PL in a right dominant system. Recommended Treatment: Valve repair / replacement and CABG. Plan: Would recommend CABG to the R-PDA at the time of SAVR. Aggressive medical   management of CAD and risk factor stratification. +----------------------------------+   ADVERSE OUTCOME(s)/COMPLICATION(s)   +----------------------------------+     None     +------------------------------+   PROCEDURAL & TECHNICAL DETAILS   +------------------------------+     Status Category                            Description   Used   Catheter, Diagnostic Imaging -      6Fr x 100cm Infiniti TL Diagnostic          Angiographic                        Catheter, Ritesh Right Coronary, JR                                              5, Femoral Selective ThruLumen,                                              Dilated, 0.038in max guidewire (EA/1)   Used   Catheter, Diagnostic Imaging -      5Fr x 100cm Infiniti TL Diagnostic          Angiographic                        Catheter, Ritesh Left Coronary, JL                                              4, Femoral Selective ThruLumen,                                              Standard, 0.038in max guidewire                                              (EA/1)     PROCEDURE SEQUENCE: Time                 Procedure Performed   6/2/2020 11:23:00 AM Left Heart Cath     PROCEDURE DETAILS:     Contrast: Contrast Type Total Infused   Isovue 100ml  96     Blood Loss: < 30ml     Specimen: No Specimen Obtained     RADIATION: Procedure performed under Fluoroscopic Guidance Total Dose Dose Area   Product Total Exposure Time   711.43 mGy   56.13 Gy*cm²        714.00 sec       +---------------+   MEDICAL HISTORY   +---------------+     Smoking History: Non-Smoker.      Family History of CAD: No     Dyslipidemia: Yes     Dialysis: Currently not on dialysis     Left Ventricle EF: 55.       Radiation: Procedure performed under Fluoroscopic Guidance     Total Dose          711.43 mGy   Dose Area Product   0.01 Gy*m²   Total Exposure Time 714.00 sec       Conscious Sedation Summary: Moderate sedation consisting of continuous ECG, pulse oxymetry and cardiopulmonary    monitoring was performed by the Cardiology Nurse, overseen by the performing   physician(s). Physician Intra Service Procedure Start Time: 6/2/2020 10:55:00 AM     Physician Intra Service Procedure End Time: 6/2/2020 11:39:15 AM     Attending Physician Presence Attestation:   Entire Procedure     Electronically submitted by: Lisandro Green MD   On: 6/3/2020 at 7:24:43 AM                Stress Test 5/7/19:   FINDINGS:   Perfusion images demonstrate no reversible perfusion defect. Wall motion is within normal limits. The end diastolic volume is 201 ml. The end systolic volume is 46 ml. The estimated ejection fraction is 59 %. Impression   1. No reversible perfusion defect   2. Ejection fraction is 59 %. 3. No significant wall motion abnormality         Device Interrogation 11/18/22:   Underlying rhythm: AF   Mode: AAIR <-> DDDR   Battery Voltage/Longevity:  9.8 years    Pacing: A: 41%  RV: 48%    P wave: 1.8 mV  Impedance: 304 ohms   Threshold: AF  RV R wave: 7.8 mV  Impedance: 361 ohms   Threshold: 0.5 V @ 0.4 ms  Episodes: 43% AF burden. Most recent 11/16/22. Reprogramming included: none  Overall device function is normal    All device programmable settings were evaluated per above and in the scanned document, along with iterative adjustments (capture thresholds) to assess and select the most appropriate final programming to provide for consistent delivery of the appropriate therapy and to verify function of the device.       I have independently reviewed all of the ECGs and rhythm strips per above Assessment/Plan: This is a 78 y.o. male with a history of   Patient Active Problem List   Diagnosis    Hypertension    ED (erectile dysfunction)    Type 2 diabetes mellitus without complication (HCC)    Hx of colonic polyps    Chronic gout    MGUS (monoclonal gammopathy of unknown significance)    Near syncope    Aortic stenosis, mild    Mild dilation of ascending aorta (HCC)    Presence of permanent cardiac pacemaker    Aortocoronary bypass status    H/O mitral valve repair    H/O aortic valve replacement    Pure hypercholesterolemia    Rectal bleed    GI bleed    Acute respiratory failure due to COVID-19 [facility])    Generalized weakness    who presents with fatigue . 1. Fatigue  - Unclear etiology.  - Ongoing for the past 6 months. - Doubt from PAF. 2. Permanent atrial fibrillation   - Asymptomatic.  - [id number] of 5  - Status post HAKEEM clip 6/2020.  - History of [address] with ELIquis but discontinue due to hematuria and HAKEEM clip. - On Lopressor for rate control. - 48% AF burden. - Currently in AF since 11/16/22. 3. Pacemaker in situ  - DOI: 6/15/20.  - [employer]tronic; dual chamber  - Indication: CHB.  - Normal device function    4. Complete AV block   - Status post pacemaker implantation 6/2020.    5. Severe aortic stenosis  - Status post AVR on 6/2020.    6. Coronary artery disease   - Status post redo CABG x 2 on 6/2020.    7. Hypertension  - On HCTZ, Zestril and Lopressor. 8. Diabetes mellitus  - On Glucophage    9. Asbestos exposure    10. MGUS    12. Prostate cancer   - Status post radiation seeds 8/2019. 13. Gout    Recommendations:  Normal pacemaker function. Continue Lopressor 25 mg bid for rate control. No need for [address] due to history of recurrent hematuria and HAKEEM clip. EP will sign off. Please call for any questions or concerns. I have spent a total of 70 minutes with the patient and the family reviewing the above stated recommendations.   And a total of >50% of that time involved face-to-face time providing counseling and or coordination of care with the other providers, reviewing records/tests, counseling/education of the patient, ordering medications/tests/procedures, coordinating care, and documenting clinical information in the EHR. Thank you for allowing me to participate in your patient's care. Please call me if there are any questions or concerns.       Maxwell Soni MD  Cardiac Electrophysiology  [address]  [facility]  12:02 PM  11/18/2022

## 2022-11-18 NOTE — CARE COORDINATION
Patient presented to ED with c/o generalized weakness x 2 days. On Bactrim for recent UTI. Wbc 3.8  and platelets 87. Has pacemaker . Oncology and EP consulted.  to discuss transition of care.

## 2022-11-18 NOTE — H&P
Zoe Dumont MD [address]  Internal medicine   History and Physical      CHIEF COMPLAINT: Fatigue      HISTORY OF PRESENT ILLNESS:    Patient was seen on the floor earlier this morning at the main campus of [facility] with the ER physician at the time of admission  Wife was at bedside  Data reviewed in detail   68-year-old  man has not been feeling well for the last few days with extreme fatigue  Yesterday he lowered himself to the floor thinking that he will have a syncopal episode  No chest pain diaphoresis or shortness of breath  He was unable to get up after that  EMS was called  Patient was brought to emergency room  Admitted for observation      Past Medical History:    Past Medical History:   Diagnosis Date    Diabetes mellitus ([address]     states glucose is running normal    Erectile dysfunction     Gout     Hypertension     states doing well    Vitamin D deficiency        Past Surgical History:    Past Surgical History:   Procedure Laterality Date    APPENDECTOMY  1967    CATARACT REMOVAL WITH IMPLANT Left 6/6/13    CATARACT REMOVAL WITH IMPLANT  10/31/13    HERNIA REPAIR  1970    [facility]    TONSILLECTOMY         Medications Prior to Admission:    Medications Prior to Admission: hydroCHLOROthiazide (HYDRODIURIL) 25 MG tablet, Take 25 mg by mouth in the morning and 25 mg at noon and 25 mg before bedtime. famotidine (PEPCID) 20 MG tablet, Take 1 tablet by mouth in the morning for 21 days. ascorbic acid (V-R VITAMIN C) 250 MG tablet, Take 1 tablet by mouth in the morning. aspirin EC 81 MG EC tablet, Take 1 tablet by mouth in the morning for 21 days.   lisinopril (PRINIVIL;ZESTRIL) 40 MG tablet, Take 40 mg by mouth every morning  metFORMIN (GLUCOPHAGE) 500 MG tablet, Take 500 mg by mouth every morning  Multiple Vitamins-Minerals (MENS 50+ MULTI VITAMIN/MIN) TABS, Take 1 tablet by mouth every morning  tamsulosin (FLOMAX) 0.4 MG capsule, Take 0.4 mg by mouth nightly  atorvastatin (LIPITOR) 40 MG Hypertension    ED (erectile dysfunction)    Type 2 diabetes mellitus without complication (HCC)    Hx of colonic polyps    Chronic gout    MGUS (monoclonal gammopathy of unknown significance)    Near syncope    Aortic stenosis, mild    Mild dilation of ascending aorta (HCC)    Cardiac pacemaker in situ    Aortocoronary bypass status    H/O mitral valve repair    H/O aortic valve replacement    Pure hypercholesterolemia    Rectal bleed    GI bleed    Acute respiratory failure due to COVID-19 [facility])    Generalized weakness             PLAN:  Admit to telemetry for observation  EP and hematology consults  DVT prophylaxis with Lovenox  Resume home medications  Questions answered to their satisfaction    Timmy Arroyo MD  4:36 PM  11/18/2022

## 2022-11-18 NOTE — PROGRESS NOTES
[address]  [address] [facility]:                                                  Patient Name: Puma Briones    MRN: [id number]    : 1940    Room: [facility]      Evaluating OT: Lisa Ardons, [address] OTR/L; [id number]      Referring Provider: Sylvia Solis MD    Specific Provider Orders/Date: OT Eval and Treat 22      Diagnosis: Generalized Weakness     Surgery: none this admission     Pertinent Medical History:  has a past medical history of Diabetes mellitus ([facility] [address] Erectile dysfunction, Gout, Hypertension, and Vitamin D deficiency.       Reason for Admission: Generalized weakness, fell last night, did not hit head, no loc, left elbow pain, currently being treated for UTI    Recommended Adaptive Equipment:  ww (pt owns other necessary equipment)      Precautions:  Fall Risk, Bed Alarm, Cognition, External Male Catheter      Assessment of current deficits:    [x] Functional mobility  [x]ADLs  [x] Strength               [x]Cognition    [x] Functional transfers   [x] IADLs         [x] Safety Awareness   [x]Endurance    [x] Fine Coordination              [x] Balance      [] Vision/perception   []Sensation     []Gross Motor Coordination  [] ROM  [] Delirium                   [] Motor Control     OT PLAN OF CARE   OT POC based on physician orders, patient diagnosis and results of clinical assessment    Frequency/Duration: 2-3 days/wk for 2 weeks PRN   Specific OT Treatment Interventions to include:   * Instruction/training on adapted ADL techniques and AE recommendations to increase functional independence within precautions       * Training on energy conservation strategies, correct breathing pattern and techniques to improve independence/tolerance for self-care routine  * Functional transfer/mobility training/DME recommendations for increased independence, safety, and fall prevention  * Patient/Family education to increase follow through with safety techniques and functional independence  * Recommendation of environmental modifications for increased safety with functional transfers/mobility and ADLs  * Therapeutic exercise to improve motor endurance, ROM, and functional strength for ADLs/functional transfers  * Therapeutic activities to facilitate/challenge dynamic balance, stand tolerance for increased safety and independence with ADLs      Home Living: Pt lives with wife in 1.5 story home with 2 steps and 1HR to access. Bed and bath on 1st floor. Pt wife present stated pt does not have to access 2nd and basement floor. Bathroom setup: walk in shower with built in shower  seat and grab bars, standard toilet  Equipment owned: standard walker, built in shower seat, pt wife states they own 3 in 1 commode     Prior Level of Function: IND with ADLs    Wife able to assist with IADLs  ambulated with no AD prior  Driving: not stated    Pain Level: 0/10  Cognition: A&O: 3-4/4 - delayed processing noted during verbal cues  Follows single 1 step directions   Memory:  fair   Sequencing:  fair   Problem solving:  fair   Judgement/safety:  fair     Functional Assessment:  AM-PAC Daily Activity Raw Score: 15/24   Initial Eval Status  Date: 11/18/22 Treatment Status  Date: STGs = LTGs  Time frame: 10-14 days   Feeding Stand by Assist   Independent    Grooming Stand by Assist  Wash hands standing sink side   Independent    UB Dressing Minimal Assist   Taco/doff gown seated EOB  Independent    LB Dressing Moderate Assist   Taco socks seated EOB using figure 4 technique   Stand by Assist    Bathing Moderate Assist  Minimal Assist    Toileting Moderate Assist   Lower surface transfer with use of grab bars  External male catheter reconnected at end of session d/t urinary frequency   Minimal Assist    Bed Mobility  Log Roll: SBA  Supine to sit:  Max A   Sit to supine: Min A   Supine to sit: IND Sit to supine: IND    Functional Transfers Sit to stand: Mod A  Stand to sit: Mod A  Stand pivot: Mod A with ww  Commode: Mod A  Sit to stand: Mod Ind   Stand to sit:Mod Ind   Stand pivot: Mod Ind  with ww  Commode: Mod Ind  with ww    Functional Mobility Mod A with ww   within household distance  Mod Ind with ww    Balance Sitting:     Static - Min A <> Mod A  Posterior lean - pt stated \"I feel upright\"     Dynamic - Mod A <> Max A  Standing: Mod A with ww  Sitting:  Static: Mod Ind  Dynamic: Mod Ind  Standing: Mod Ind with ww   Activity Tolerance FAIR  Limited d/t overall fatigue and weakness  GOOD   Visual/  Perceptual Glasses: no          Vitals HR: 70s         BUE  ROM/Strength/  Fine motor Coordination Hand dominance: Right     RUE: ROM WFL     Strength: 4-/5      Strength: FAIR     Coordination:  FAIR     LUE: ROM WFL     Strength: 4-/5      Strength: FAIR     Coordination:  FAIR  increase BUE muscle strength for improved indep with functional transfers       Fine Motor Coordination: finger to nose assessment appears WFL  Hearing: WFL   Sensation:  No c/o numbness or tingling  Tone: WFL   Edema: unremarkable    Patient/Family Goal: pt goal is to return    Based on patient's functional performance as stated below and level of assistance needed prior to admission, this therapist believes that the patient would benefit from further skilled OT following hospital stay in an effort to increase safety, functional independence, and quality of life. Comment: Cleared by RN to see pt. Upon arrival patient lying supine in bed with wife present and agreeable to OT session. At end of session, patient lying supine in bed with wife present with call light and phone within reach, all lines and tubes intact. Overall patient demonstrated  decreased independence and safety during completion of ADL/functional transfer/mobility tasks.   Pt would benefit from continued skilled OT to increase safety and independence with completion of ADL/IADL tasks for functional independence and quality of life. Treatment:  Pt required vc's for proper technique/safety with hand placement/body mechanics/posture for bed mobility/ADLs/functional tranfers/mobility/ww management. Pt required vc's for sequencing/initiation of ADLs/functional transfers. Pt able to  sit EOB ~10 mins and at sink ~2 mins to increase core strength/balance/activity tolerance for ease with ADLs. Pt required increased time to complete ADLs/functional transfers due to management of overall fatigue and weakness. Pt completed functional mobility within room and hallway using ww with verbal cues for proper body mechanics and gait speed. Pt completed toileting task with ww in bathroom verbal cues for lower surface transfer and sink side hand hygiene. Pt returned to supine in bed. Pt required skilled monitoring of SpO2 during session and education on energy conservation techniques. Pt required rest breaks during session and educated on pursed lip breathing. Pt appeared to have tolerated session well and appears motivated/cooperative/pleasant . Pt instructed on use of call light for assistance and fall prevention. Pt demo'ing fair understanding of education provided. Continue to educate. Rehab Potential: Good  for established goals       LTG: maximize independence with ADLs to return to PLOF    Patient and/or family were instructed on functional diagnosis, prognosis/goals and OT plan of care. Demonstrated FAIR understanding. [] Malnutrition indicators have been identified and nursing has been notified to ensure a dietitian consult is ordered.         Eval Complexity: Low     Evaluation time includes thorough review of current medical information, gathering information on past medical & social history & PLOF, completion of standardized testing, informal observation of tasks, consultation with other medical professions/disciplines, assessment of data & development of POC/goals. Time In: 1320  Time Out: 1400  Total Treatment Time: 25    Min Units   OT Eval Low 78052  x     OT Eval Medium 55837      OT Eval High 37000      OT Re-Eval [id number]       Therapeutic Ex 42415       Therapeutic Activities 21936  10  1   ADL/Self Care 97527  15  1   Orthotic Management 36463       Manual [id number]     Neuro Re-Ed 08547       Non-Billable Time          Evaluation Time additionally includes thorough review of current medical information, gathering information on past medical history/social history and prior level of function, interpretation of standardized testing/informal observation of tasks, assessment of data and development of plan of care and goals.             Tresea Stallion, MSOT OTR/L; [id number]

## 2022-11-18 NOTE — CONSULTS
11/18/2022 11:05 AM  [facility]  [id number]     Chief Complaint:    Suspected urinary tract infection    History of Present Illness: The patient is a 78 y.o. male patient who presented to the hospital with complaints of weakness and fatigue. Per his wife, he had been treated for a UTI recently with 5 days of Bactrim. He remained weak which prompted her to bring him to the emergency department. Currently has no complaints. Denies pain. He is known to our practice. He has a history of prostate cancer for which he was treated with seeds in 2019 at [facility] by Dr. Roxanne Mercado. He also has a history of overactive bladder  His PSA most recently has remained stable at 0.07  Last PSA in July at [facility] was 0.20        Past Medical History:   Diagnosis Date    Diabetes mellitus ([address]     states glucose is running normal    Erectile dysfunction     Gout     Hypertension     states doing well    Vitamin D deficiency          Past Surgical History:   Procedure Laterality Date    APPENDECTOMY  1967    CATARACT REMOVAL WITH IMPLANT Left 6/6/13    CATARACT REMOVAL WITH IMPLANT  10/31/13    HERNIA REPAIR  1970    [facility]    TONSILLECTOMY         Medications Prior to Admission:    Medications Prior to Admission: hydroCHLOROthiazide (HYDRODIURIL) 25 MG tablet, Take 25 mg by mouth in the morning and 25 mg at noon and 25 mg before bedtime. famotidine (PEPCID) 20 MG tablet, Take 1 tablet by mouth in the morning for 21 days. ascorbic acid (V-R VITAMIN C) 250 MG tablet, Take 1 tablet by mouth in the morning. aspirin EC 81 MG EC tablet, Take 1 tablet by mouth in the morning for 21 days.   lisinopril (PRINIVIL;ZESTRIL) 40 MG tablet, Take 40 mg by mouth every morning  metFORMIN (GLUCOPHAGE) 500 MG tablet, Take 500 mg by mouth every morning  Multiple Vitamins-Minerals (MENS 50+ MULTI VITAMIN/MIN) TABS, Take 1 tablet by mouth every morning  tamsulosin (FLOMAX) 0.4 MG capsule, Take 0.4 mg by mouth nightly  atorvastatin (LIPITOR) 40 MG tablet, Take 40 mg by mouth nightly  metoprolol tartrate (LOPRESSOR) 25 MG tablet, Take 25 mg by mouth in the morning and 25 mg before bedtime. vitamin D (CHOLECALCIFEROL) 1000 UNIT TABS tablet, Take 1,000 Units by mouth every morning    Allergies:    Patient has no known allergies. Social History:    reports that he has quit smoking. He quit smokeless tobacco use about 52 years ago. He reports current alcohol use. He reports that he does not use drugs. Family History:   Non-contributory to this Urological problem  family history is not on file. Review of Systems:  Constitutional: No fever or chills, weak  Respiratory: negative for cough and hemoptysis  Cardiovascular: negative for chest pain and dyspnea  Gastrointestinal: negative for abdominal pain, diarrhea, nausea and vomiting   Derm: negative for rash and skin lesion(s)  Neurological: negative for seizures and tremors  Musculoskeletal: Negative    Psychiatric: Negative   : As above in the HPI, otherwise negative  All other reviews are negative    Physical Exam:     Vitals:  BP (!) 124/59   Pulse 63   Temp 99.5 °F (37.5 °C) (Oral)   Resp 12   Ht 6' 1\" (1.854 m)   Wt 185 lb 7 oz (84.1 kg)   SpO2 97%   BMI 24.47 kg/m²     General:  Awake, alert, oriented X 3. No apparent distress. HEENT:  Normocephalic, atraumatic. Lungs:  Respirations symmetric and non-labored. Abdomen:  soft, nontender, no masses  Extremities:  No clubbing, cyanosis, or edema  Skin:  Warm and dry, no open lesions or rashes  Neuro:  There are no motor or sensory deficits in the 4 quadrant extremities   Rectal: deferred  Genitourinary:  no haines     Labs:     Recent Labs     11/17/22  1030 11/18/22  0652   WBC 1.9* 3.8*   RBC 3.09* 3.04*   HGB 10.4* 10.2*   HCT 30.9* 30.9*   .0* 101.6*   MCH 33.7 33.6   MCHC 33.7 33.0   RDW 14.6 14.5   PLT 91* 87*   MPV 11.4 11.6         Recent Labs     11/17/22  1030 11/18/22  0652   CREATININE 1.7* 1.4*       Lab Results   Component Value Date    PSA 0.07 11/17/2022    PSA 15.4 (H) 05/31/2019       Imaging:   Narrative   EXAMINATION:   CT OF THE ABDOMEN AND PELVIS WITHOUT CONTRAST 11/17/2022 4:35 pm       TECHNIQUE:   CT of the abdomen and pelvis was performed without the administration of   intravenous contrast. Multiplanar reformatted images are provided for review. Automated exposure control, iterative reconstruction, and/or weight based   adjustment of the mA/kV was utilized to reduce the radiation dose to as low   as reasonably achievable. COMPARISON:   CT of the abdomen and pelvis, 07/28/2022. HISTORY:   ORDERING SYSTEM PROVIDED HISTORY: UTI   TECHNOLOGIST PROVIDED HISTORY:   Reason for exam:->UTI   Additional Contrast?->None   What reading provider will be dictating this exam?->CRC       FINDINGS:   Lower Chest: The heart is mildly enlarged. Pacemaker in situ. Mild   subpleural scarring is seen in the lungs. There is mild interstitial   prominence likely due to edema. Organs:       Liver: Unremarkable. Gallbladder: Unremarkable. Pancreas: Mildly atrophied. Otherwise, unremarkable. Spleen:  Unremarkable. Adrenals: Unremarkable. Kidneys: Multiple right renal cysts are seen. No stone or hydronephrosis. GI/Bowel: Severe distal colonic diverticulosis without diverticulitis. No   bowel wall thickening or obstruction. Status post appendectomy. Pelvis: The urinary bladder is grossly unremarkable. Brachytherapy seeds are   seen in the prostate gland. Peritoneum/Retroperitoneum: Prominent calcified atherosclerosis is seen in   the aorta. No aneurysm. No lymphadenopathy. No free air or free fluid is   seen. 1.5 cm calcified lesion in the anterior peritoneal cavity on the left   may represent a so-called peritoneal mouse, believed to be the result of a   torsed appendagitis epiploica. Bones/Soft Tissues:  The visualized bones are intact without fracture or focal lesion. Prominent loss of disc height at L3-4. Impression   1. No acute abnormality is seen in the abdomen or the pelvis. 2. Severe distal colonic diverticulosis without diverticulitis. 3. Mild cardiomegaly. Assessment/plan:  Right renal cysts   PCA s/p seeds in 2019   Recent UTI   OAB    UA reviewed  Blood cultures pending  Antibiotics per primary   PSA stable at 0.07  CTAP without any evidence of obstructive uropathy that would require acute  interventions at this time  His most recent PVR was 8ml  He can be maintained without a haines catheter at this time   Oncology has been consulted in regards to his leukopenia   Hold on further acute  interventions  Will follow       Electronically signed by DESMOND Peng CNP on 11/18/2022 at 11:05 AM  [facility] I interviewed and examined the patient yesterday and talk to his wife he had a suspicion of multiple myeloma in the past and has seen an oncologist at [facility]. He had radiation seeds without external beam radiation in 2019. He has been having incontinence he been using depends and has been not had clinical retention however. Do note that he had low white count anemia and lower platelets compatible with a pancytopenia further assessment will be according to his primary care physician.     The PSA is excellent and his postvoid residual showed no retention continue to manage him without a Haines catheter  I agree with the above note and plan by the nurse practitioner

## 2022-11-18 NOTE — CONSULTS
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 Status post I-125 seed implantation on 08/22/2019 treated at [facility]. PMH also with diabetes and HTN. Patient presented to the ED for evaluation of weakness and fatigue. Recently treated for UTI with Bactrim. CTAP with no acute abnormality seen in the abdomen pelvis. Severe distal colonic diverticulosis without diverticulitis. Neurology following for history of prostate cancer. No  interventions at this time. PSA 0.07. Patient was found to be leukopenic WBC 1.9, ANC 1. 19. Also anemic Hgb at 10.4 .0. Platelet 91. Today's WBC 3.8 ANC is now normal at 3.12. Consultation for evaluation of leukopenia.       Diagnostic Data:     Past Medical History:   Diagnosis Date    Diabetes mellitus ([facility]     states glucose is running normal    Erectile dysfunction     Gout     Hypertension     states doing well    Vitamin D deficiency        Patient Active Problem List    Diagnosis Date Noted    Generalized weakness 11/17/2022    Acute respiratory failure due to COVID-19 [facility]) 07/28/2022    Rectal bleed 10/09/2020    GI bleed 10/09/2020    Presence of permanent cardiac pacemaker 06/24/2020    Aortocoronary bypass status 06/24/2020    H/O mitral valve repair 06/24/2020    H/O aortic valve replacement 06/24/2020    Pure hypercholesterolemia 06/24/2020    Aortic stenosis, mild 05/07/2019    Mild dilation of ascending aorta ([facility] 05/07/2019    Near syncope 05/05/2019    MGUS (monoclonal gammopathy of unknown significance) 03/28/2017    Chronic gout 09/28/2016    Type 2 diabetes mellitus without complication ([facility] 13/51/7923    Hx of colonic polyps 04/17/2016    Hypertension 04/14/2016    ED (erectile dysfunction) 04/14/2016        Past Surgical History:   Procedure Laterality Date    APPENDECTOMY  1967    CATARACT REMOVAL WITH IMPLANT Left 6/6/13    CATARACT REMOVAL WITH IMPLANT  10/31/13    HERNIA REPAIR  1970    [facility]    TONSILLECTOMY         Family History  History reviewed. No pertinent family history. Social History    TOBACCO:   reports that he has quit smoking. He quit smokeless tobacco use about 52 years ago. ETOH:   reports current alcohol use. Home Medications  Prior to Admission medications    Medication Sig Start Date End Date Taking? Authorizing Provider   hydroCHLOROthiazide (HYDRODIURIL) 25 MG tablet Take 25 mg by mouth in the morning and 25 mg at noon and 25 mg before bedtime. Historical Provider, MD   famotidine (PEPCID) 20 MG tablet Take 1 tablet by mouth in the morning for 21 days. 7/30/22 8/20/22  Seth Lane MD   ascorbic acid (V-R VITAMIN C) 250 MG tablet Take 1 tablet by mouth in the morning. 7/30/22 8/29/22  Seth Lane MD   aspirin EC 81 MG EC tablet Take 1 tablet by mouth in the morning for 21 days.  7/30/22 8/20/22  Seth Lane MD   lisinopril (PRINIVIL;ZESTRIL) 40 MG tablet Take 40 mg by mouth every morning    Historical Provider, MD   metFORMIN (GLUCOPHAGE) 500 MG tablet Take 500 mg by mouth every morning    Historical Provider, MD   Multiple Vitamins-Minerals (MENS 50+ MULTI VITAMIN/MIN) TABS Take 1 tablet by mouth every morning    Historical Provider, MD   tamsulosin (FLOMAX) 0.4 MG capsule Take 0.4 mg by mouth nightly    Historical Provider, MD   atorvastatin (LIPITOR) 40 MG tablet Take 40 mg by mouth nightly    Historical Provider, MD   metoprolol tartrate (LOPRESSOR) 25 MG tablet Take 25 mg by mouth in the morning and 25 mg before bedtime. Historical Provider, MD   vitamin D (CHOLECALCIFEROL) 1000 UNIT TABS tablet Take 1,000 Units by mouth every morning    Historical Provider, MD       Allergies  No Known Allergies    Review of Systems: Weakness and fatigue, left elbow pain. Objective  BP (!) 124/59   Pulse 63   Temp 99.5 °F (37.5 °C) (Oral)   Resp 12   Ht 6' 1\" (1.854 m)   Wt 185 lb 7 oz (84.1 kg)   SpO2 97%   BMI 24.47 kg/m²     Physical Exam:   Performance Status:  General: AAO to person, place, time, in no acute distress,   Head and neck : PERRLA, EOMI . Sclera non icteric. Oropharynx : Clear  Neck: no JVD,  no adenopathy  Heart: Regular rate and regular rhythm,   Lungs: Clear to auscultation   Extremities: No edema,no cyanosis,   Abdomen: Soft, non-tender;no masses, no organomegaly  Skin:  No rash. Neurologic:Cranial nerves grossly intact. No focal motor or sensory deficits .     Recent Laboratory Data-   Lab Results   Component Value Date    WBC 3.8 (L) 11/18/2022    HGB 10.2 (L) 11/18/2022    HCT 30.9 (L) 11/18/2022    .6 (H) 11/18/2022    PLT 87 (L) 11/18/2022    LYMPHOPCT 9.8 (L) 11/18/2022    RBC 3.04 (L) 11/18/2022    MCH 33.6 11/18/2022    MCHC 33.0 11/18/2022    RDW 14.5 11/18/2022    NEUTOPHILPCT 82.1 (H) 11/18/2022    MONOPCT 5.4 11/18/2022    BASOPCT 0.5 11/18/2022    NEUTROABS 3.12 11/18/2022    LYMPHSABS 0.38 (L) 11/18/2022    MONOSABS 0.19 11/18/2022    EOSABS 0.10 11/18/2022    BASOSABS 0.00 11/18/2022       Lab Results   Component Value Date     11/18/2022    K 4.6 11/18/2022     11/18/2022    CO2 18 (L) 11/18/2022    BUN 24 (H) 11/18/2022    CREATININE 1.4 (H) 11/18/2022    GLUCOSE 101 (H) 11/18/2022    CALCIUM 8.9 11/18/2022    PROT 6.7 11/18/2022    LABALBU 3.7 11/18/2022    BILITOT 0.6 11/18/2022    ALKPHOS 109 11/18/2022    AST 42 (H) 11/18/2022    ALT 34 11/18/2022    LABGLOM 49 11/18/2022    GFRAA >60 07/30/2022       No results found for: IRON, TIBC, FERRITIN        Radiology-    CT ABDOMEN PELVIS WO CONTRAST Additional Contrast? None    Result Date: 11/17/2022  EXAMINATION: CT OF THE ABDOMEN AND PELVIS WITHOUT CONTRAST 11/17/2022 4:35 pm TECHNIQUE: CT of the abdomen and pelvis was performed without the administration of intravenous contrast. Multiplanar reformatted images are provided for review. Automated exposure control, iterative reconstruction, and/or weight based adjustment of the mA/kV was utilized to reduce the radiation dose to as low as reasonably achievable. COMPARISON: CT of the abdomen and pelvis, 07/28/2022. HISTORY: ORDERING SYSTEM PROVIDED HISTORY: UTI TECHNOLOGIST PROVIDED HISTORY: Reason for exam:->UTI Additional Contrast?->None What reading provider will be dictating this exam?->CRC FINDINGS: Lower Chest: The heart is mildly enlarged. Pacemaker in situ. Mild subpleural scarring is seen in the lungs. There is mild interstitial prominence likely due to edema. Organs: Liver: Unremarkable. Gallbladder: Unremarkable. Pancreas: Mildly atrophied. Otherwise, unremarkable. Spleen:  Unremarkable. Adrenals: Unremarkable. Kidneys: Multiple right renal cysts are seen. No stone or hydronephrosis. GI/Bowel: Severe distal colonic diverticulosis without diverticulitis. No bowel wall thickening or obstruction. Status post appendectomy. Pelvis: The urinary bladder is grossly unremarkable. Brachytherapy seeds are seen in the prostate gland. Peritoneum/Retroperitoneum: Prominent calcified atherosclerosis is seen in the aorta. No aneurysm. No lymphadenopathy. No free air or free fluid is seen. 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 Bones/Soft Tissues: The visualized bones are intact without fracture or focal lesion.   Prominent loss of disc height at L3-4.     1.  No acute abnormality is seen in the abdomen or the pelvis. 2. Severe distal colonic diverticulosis without diverticulitis. 3. Mild cardiomegaly. XR CHEST PORTABLE    Result Date: 11/17/2022  EXAMINATION: ONE XRAY VIEW OF THE CHEST 11/17/2022 10:38 am COMPARISON: None. HISTORY: ORDERING SYSTEM PROVIDED HISTORY: weakness TECHNOLOGIST PROVIDED HISTORY: Reason for exam:->weakness What reading provider will be dictating this exam?->CRC FINDINGS: The cardiac silhouette is within normals. Postop sternotomy changes are noted. There is a dual lead cardiac pacer on the left The lungs are clear. There is no focal infiltrate or effusion. 1. There is no acute cardiopulmonary disease. 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 Adenocarcinoma of the prostate, initial PSA of 15.4 ng/mL, [place] of 3+4=7. Status post I-125 seed implantation on 08/22/2019 treated at [facility]. PMH also with diabetes and HTN. - Evaluation of leukopenia.    - Weakness and fatigue. Recently treated for UTI with Bactrim. - CTAP with no acute abnormality seen in the abdomen pelvis. Severe distal colonic diverticulosis without diverticulitis. Neurology following for history of prostate cancer. No  interventions at this time. - PSA 0.07.    - leukopenic WBC 1.9, ANC 1. 19. Also anemic Hgb at 10.4 .0. Platelet 91. Today's WBC 3.8 ANC is now normal at 3.12. Attending addendum. Leukopenia likely related to myelosuppression by recent intake of Bactrim. His WBC count today is improved with normal ANC.   Has residual lymphopenia  Recent drop in hemoglobin also likely related to myelosuppression by recent UTI and intake of Bactrim  Chronic moderate thrombocytopenia likely related to decreased marrow reserve from prior radiation seeds for prostate cancer    Smoldering multiple myeloma has not required any treatment  To check SPEP, IEP, LDH, quantitative immunoglobulins, random UPEP and serum FLC's  We will follow as outpatient    Charmayne Crystal, APRN - CNP  Electronically signed 11/18/2022 at 11:27 AM  Thank you for the consult, we will follow  Patient seen and examined, note edited to reflect above  Jesus Mack

## 2022-11-18 NOTE — CARE COORDINATION
11/18, Patient admitted for generalized weakness. SW met with patient at bedside along with wife in room. Discussed transition of care/SW role. Patient lives with wife in a 1 and a half story home with 2 steps to enter the home. DME owned is a standard walker and Pharmacy is [facility] [place]. PCP is Dr. So Stone. HHC in past has been [facility] and patient would use them again if needed. Samanta Gordon from [facility] to follow patient and if [address] is needed will need HHC order. Patient has no hx of KYLEIGH. Patient would use [facility] and declined [facility].  Referral made to Krishna Dean who will run patient's insurance and depending on policy patient could have a co pay. Wife and patient along with nursing updated on the above. Therapy called for patient to be seen ASAP. SW/CM to follow for further discharge planning needs.       TRACE Dwyer  [facility] Case Management  [phone]

## 2022-11-18 NOTE — PROGRESS NOTES
Physical Therapy  Physical Therapy Initial Assessment     Name: Alexandre Gramajo  : 1940  MRN: [id number]      Date of Service: 2022    Evaluating PT:  Sylvie Zapata, PT, DPT [id number]    Room #:  9620/2996-P  Diagnosis:  Generalized weakness [R53.1]  Elevated troponin [R77.8]  PMHx/PSHx:    Past Medical History:   Diagnosis Date    Diabetes mellitus ([address]     states glucose is running normal    Erectile dysfunction     Gout     Hypertension     states doing well    Vitamin D deficiency      Precautions:  Fall risk, Alarms, Pacemaker   Equipment Needs:  FWW    SUBJECTIVE:  Pt lives with wife in a 1.5 story home with two steps to enter with one rail from garage. Bed and bathroom on first level with walk in shower with built in seat and grab bars around toilet. Equipment Owned: Standard Walker     OBJECTIVE:   Initial Evaluation  Date: 22 Treatment Short Term/ Long Term   Goals   AM-PAC 6 Clicks      Was pt agreeable to Eval/treatment? Yes     Does pt have pain? None     Bed Mobility  Rolling: SBA  Supine to sit: Max A  Sit to supine: Min A  Scooting: Max A   Supervision   Transfers Sit to stand: Mod A  Stand to sit: Mod A  Stand pivot: Min A with FWW  Supervision with FWW   Ambulation    25 feet with FWW Min A  >50 feet with FWW Supervision   Stair negotiation: ascended and descended  NT   2 steps with unilateral rail SBA   ROM BUE:  AROM WFL  BLE:  AROM WFL     Strength BUE:  Refer to OT   BLE:  4+/5 B LE   5/5   Balance Sitting EOB:  static Min progressing to Mod, dynamic Mod progressing to Max   Dynamic Standing:  Min A with FWW   Sitting EOB:  Supervision   Dynamic Standing:  Supervision with FWW     Pt is A & O x 3 (self, location, year) slow processing and reduced insight to condition  Sensation:  Pt denies numbness and tingling to extremities  Edema:  unremarkable   Vitals:  WNLs    Patient education  Pt educated on role and benefits of physical therapy, safety and technique for mobility    Patient response to education:   Pt verbalized understanding Pt demonstrated skill Pt requires further education in this area   x x x     ASSESSMENT:    Conditions Requiring Skilled Therapeutic Intervention:    [x]Decreased strength     []Decreased ROM  [x]Decreased functional mobility  [x]Decreased balance   [x]Decreased endurance   []Decreased posture  []Decreased sensation  []Decreased coordination   []Decreased vision  [x]Decreased safety awareness   []Increased pain       Comments:  Patient deemed medically stable prior to therapy evaluation. Patient was semi supine resting with wife present upon therapy arrival and provided consent to evaluation with OT collaboration. Patient required trunk and LE assist for bed mobility, as well as increased assist EOB due to retropulsion dynamic > static. When asked if patient felt his posterior lean he stated \"I feel like I'm upright\". Maximal Vcs for safety with hand placement during all transfers due to fair carry over with education. Ambulation revealed gait deficits including: reduced rakesh, decreased stride length, poor foot clearance, increased trunk flexion. With Vcs stride did improve for ~4 feet and then declined with distance. Stand pivot took significantly increased time as well. Patient returned supine with additional rolling to optimally adjust new chucks. Skilled positioning for comfort and to prevent feet contacting foot rail; bed extension requested to RN. Patient left with wife present, with all needs met and call light in reach for safety. Lines managed. Education provided on recommendations of continued skilled care to improve mobility and reduce risk for concurrent falls; CM notified of environmental suggestions.     Treatment:  Patient practiced and was instructed in the following treatment:    Bed mobility training - pt given verbal and tactile cues to facilitate proper sequencing and safety during rolling and supine<>sit as well as provided with physical assistance to complete task   Sitting EOB for >10 minutes for upright tolerance, postural awareness and BLE ROM, hygiene   Transfer training - pt was given verbal and tactile cues to facilitate proper hand placement, technique and safety during sit to stand and stand to sit as well as provided with physical assistance. Gait training- pt was given verbal and tactile cues to facilitate improved stride length and rakesh during ambulation as well as provided with physical assistance. Skilled positioning - Pt placed in the chair position with pillows utilized to facilitate upright posture, joint and skin integrity, and interaction with environment. Pt's/ family goals   1. Return to PLOF    Prognosis is good for reaching above PT goals. Patient and or family understand(s) diagnosis, prognosis, and plan of care.   Yes    PHYSICAL THERAPY PLAN OF CARE:    PT POC is established based on physician order and patient diagnosis     Referring provider/PT Order:    11/18/22 1200  PT eval and treat  Start:  11/18/22 1200,   End:  11/18/22 1200,   ONE TIME,   Standing Count:  1 Occurrences,   R         Elisa Holt MD Acknowledge      Diagnosis:  Generalized weakness [R53.1]  Elevated troponin [R77.8]  Specific instructions for next treatment:  increase ambulatory distance, attempt stair negotiation    Current Treatment Recommendations:     [] Strengthening to improve independence with functional mobility   [] ROM to improve independence with functional mobility   [] Balance Training to improve static/dynamic balance and to reduce fall risk  [] Endurance Training to improve activity tolerance during functional mobility   [] Transfer Training to improve safety and independence with all functional transfers   [] Gait Training to improve gait mechanics, endurance and assess need for appropriate assistive device  [] Stair Training in preparation for safe discharge home and/or into the community   [] Positioning to prevent skin breakdown and contractures  [] Safety and Education Training   [] Patient/Caregiver Education   [] HEP  [] Other     PT long term treatment goals are located in above grid    Frequency of treatments: 2-5x/week x 1-2 weeks. Time in  1320  Time out  1400    Total Treatment Time  25 minutes     Evaluation Time includes thorough review of current medical information, gathering information on past medical history/social history and prior level of function, completion of standardized testing/informal observation of tasks, assessment of data and education on plan of care and goals.     CPT codes:  [x] Low Complexity PT evaluation 36078  [] Moderate Complexity PT evaluation 84173  [] High Complexity PT evaluation 75762  [] PT Re-evaluation 01721  [] Gait training 89919 - minutes  [] Manual therapy 95986 - minutes  [x] Therapeutic activities 37533 25 minutes  [] Therapeutic exercises 51736 - minutes  [] Neuromuscular reeducation 50861 - minutes     Skyler Perez PT, DPT [id number]

## 2022-11-19 LAB
ALBUMIN SERPL-MCNC: 3.5 G/DL (ref 3.5–5.2)
ALP BLD-CCNC: 106 U/L (ref 40–129)
ALT SERPL-CCNC: 35 U/L (ref 0–40)
ANION GAP SERPL CALCULATED.3IONS-SCNC: 9 MMOL/L (ref 7–16)
AST SERPL-CCNC: 38 U/L (ref 0–39)
BASOPHILS ABSOLUTE: 0.01 E9/L (ref 0–0.2)
BASOPHILS RELATIVE PERCENT: 0.3 % (ref 0–2)
BILIRUB SERPL-MCNC: 0.7 MG/DL (ref 0–1.2)
BUN BLDV-MCNC: 27 MG/DL (ref 6–23)
CALCIUM SERPL-MCNC: 8.5 MG/DL (ref 8.6–10.2)
CHLORIDE BLD-SCNC: 105 MMOL/L (ref 98–107)
CO2: 22 MMOL/L (ref 22–29)
CREAT SERPL-MCNC: 1.3 MG/DL (ref 0.7–1.2)
EOSINOPHILS ABSOLUTE: 0.12 E9/L (ref 0.05–0.5)
EOSINOPHILS RELATIVE PERCENT: 3.5 % (ref 0–6)
FERRITIN: 585 NG/ML
FOLATE: >20 NG/ML (ref 4.8–24.2)
GFR SERPL CREATININE-BSD FRML MDRD: 54 ML/MIN/1.73
GLUCOSE BLD-MCNC: 86 MG/DL (ref 74–99)
HCT VFR BLD CALC: 28.1 % (ref 37–54)
HCT VFR BLD CALC: 28.3 % (ref 37–54)
HEMOGLOBIN: 9.5 G/DL (ref 12.5–16.5)
IMMATURE GRANULOCYTES #: 0.01 E9/L
IMMATURE GRANULOCYTES %: 0.3 % (ref 0–5)
IMMATURE RETIC FRACT: 10.1 % (ref 2.3–13.4)
IRON SATURATION: 8 % (ref 20–55)
IRON: 16 MCG/DL (ref 59–158)
LACTATE DEHYDROGENASE: 300 U/L (ref 135–225)
LYMPHOCYTES ABSOLUTE: 1.02 E9/L (ref 1.5–4)
LYMPHOCYTES RELATIVE PERCENT: 29.7 % (ref 20–42)
MCH RBC QN AUTO: 33 PG (ref 26–35)
MCHC RBC AUTO-ENTMCNC: 33.6 % (ref 32–34.5)
MCV RBC AUTO: 98.3 FL (ref 80–99.9)
MONOCYTES ABSOLUTE: 0.45 E9/L (ref 0.1–0.95)
MONOCYTES RELATIVE PERCENT: 13.1 % (ref 2–12)
NEUTROPHILS ABSOLUTE: 1.83 E9/L (ref 1.8–7.3)
NEUTROPHILS RELATIVE PERCENT: 53.1 % (ref 43–80)
PDW BLD-RTO: 14.4 FL (ref 11.5–15)
PLATELET # BLD: 88 E9/L (ref 130–450)
PLATELET CONFIRMATION: NORMAL
PMV BLD AUTO: 11.8 FL (ref 7–12)
POTASSIUM SERPL-SCNC: 4.5 MMOL/L (ref 3.5–5)
RBC # BLD: 2.88 E12/L (ref 3.8–5.8)
RETIC HGB EQUIVALENT: 32.5 PG (ref 28.2–36.6)
RETICULOCYTE ABSOLUTE COUNT: 0.02 E12/L
RETICULOCYTE COUNT PCT: 0.7 % (ref 0.4–1.9)
SODIUM BLD-SCNC: 136 MMOL/L (ref 132–146)
TOTAL IRON BINDING CAPACITY: 197 MCG/DL (ref 250–450)
TOTAL PROTEIN: 6.2 G/DL (ref 6.4–8.3)
VITAMIN B-12: 661 PG/ML (ref 211–946)
WBC # BLD: 3.4 E9/L (ref 4.5–11.5)

## 2022-11-19 PROCEDURE — 82728 ASSAY OF FERRITIN: CPT

## 2022-11-19 PROCEDURE — 82232 ASSAY OF BETA-2 PROTEIN: CPT

## 2022-11-19 PROCEDURE — 85025 COMPLETE CBC W/AUTO DIFF WBC: CPT

## 2022-11-19 PROCEDURE — 36415 COLL VENOUS BLD VENIPUNCTURE: CPT

## 2022-11-19 PROCEDURE — 83540 ASSAY OF IRON: CPT

## 2022-11-19 PROCEDURE — 2140000000 HC CCU INTERMEDIATE R&B

## 2022-11-19 PROCEDURE — 85045 AUTOMATED RETICULOCYTE COUNT: CPT

## 2022-11-19 PROCEDURE — 82784 ASSAY IGA/IGD/IGG/IGM EACH: CPT

## 2022-11-19 PROCEDURE — 83550 IRON BINDING TEST: CPT

## 2022-11-19 PROCEDURE — 6360000002 HC RX W HCPCS: Performed by: INTERNAL MEDICINE

## 2022-11-19 PROCEDURE — 6370000000 HC RX 637 (ALT 250 FOR IP): Performed by: INTERNAL MEDICINE

## 2022-11-19 PROCEDURE — 82607 VITAMIN B-12: CPT

## 2022-11-19 PROCEDURE — 83615 LACTATE (LD) (LDH) ENZYME: CPT

## 2022-11-19 PROCEDURE — 86334 IMMUNOFIX E-PHORESIS SERUM: CPT

## 2022-11-19 PROCEDURE — 82746 ASSAY OF FOLIC ACID SERUM: CPT

## 2022-11-19 PROCEDURE — 80053 COMPREHEN METABOLIC PANEL: CPT

## 2022-11-19 PROCEDURE — 84165 PROTEIN E-PHORESIS SERUM: CPT

## 2022-11-19 PROCEDURE — 83883 ASSAY NEPHELOMETRY NOT SPEC: CPT

## 2022-11-19 RX ADMIN — METFORMIN HYDROCHLORIDE 500 MG: 500 TABLET ORAL at 09:10

## 2022-11-19 RX ADMIN — Medication 1000 UNITS: at 09:10

## 2022-11-19 RX ADMIN — TAMSULOSIN HYDROCHLORIDE 0.4 MG: 0.4 CAPSULE ORAL at 21:14

## 2022-11-19 RX ADMIN — FAMOTIDINE 20 MG: 20 TABLET ORAL at 09:10

## 2022-11-19 RX ADMIN — METOPROLOL TARTRATE 25 MG: 25 TABLET, FILM COATED ORAL at 21:15

## 2022-11-19 RX ADMIN — LISINOPRIL 40 MG: 20 TABLET ORAL at 09:11

## 2022-11-19 RX ADMIN — ENOXAPARIN SODIUM 40 MG: 100 INJECTION SUBCUTANEOUS at 09:11

## 2022-11-19 RX ADMIN — ATORVASTATIN CALCIUM 40 MG: 40 TABLET, FILM COATED ORAL at 21:15

## 2022-11-19 RX ADMIN — METOPROLOL TARTRATE 25 MG: 25 TABLET, FILM COATED ORAL at 09:11

## 2022-11-19 NOTE — PROGRESS NOTES
MD Brent Tapia  Internal medicine   History and Physical      CHIEF COMPLAINT: Fatigue      HISTORY OF PRESENT ILLNESS:    11/18/2022  Patient was seen on the floor earlier this morning at the main campus of [facility] with the ER physician at the time of admission  Wife was at bedside  Data reviewed in detail   69-year-old  man has not been feeling well for the last few days with extreme fatigue  Yesterday he lowered himself to the floor thinking that he will have a syncopal episode  No chest pain diaphoresis or shortness of breath  He was unable to get up after that  EMS was called  Patient was brought to emergency room  Admitted for observation  11/19/2022  Patient was seen on the floor earlier this morning  Wife at bedside  Data reviewed in detail  He is feeling weak and tired otherwise no specific complaints    Past Medical History:    Past Medical History:   Diagnosis Date    Diabetes mellitus ([address]     states glucose is running normal    Erectile dysfunction     Gout     Hypertension     states doing well    Vitamin D deficiency        Past Surgical History:    Past Surgical History:   Procedure Laterality Date    APPENDECTOMY  1967    CATARACT REMOVAL WITH IMPLANT Left 6/6/13    CATARACT REMOVAL WITH IMPLANT  10/31/13    HERNIA REPAIR  1970    [facility]    TONSILLECTOMY         Medications Prior to Admission:    Medications Prior to Admission: hydroCHLOROthiazide (HYDRODIURIL) 25 MG tablet, Take 25 mg by mouth in the morning and 25 mg at noon and 25 mg before bedtime. famotidine (PEPCID) 20 MG tablet, Take 1 tablet by mouth in the morning for 21 days. ascorbic acid (V-R VITAMIN C) 250 MG tablet, Take 1 tablet by mouth in the morning. aspirin EC 81 MG EC tablet, Take 1 tablet by mouth in the morning for 21 days.   lisinopril (PRINIVIL;ZESTRIL) 40 MG tablet, Take 40 mg by mouth every morning  metFORMIN (GLUCOPHAGE) 500 MG tablet, Take 500 mg by mouth every morning  Multiple Vitamins-Minerals (MENS 50+ MULTI VITAMIN/MIN) TABS, Take 1 tablet by mouth every morning  tamsulosin (FLOMAX) 0.4 MG capsule, Take 0.4 mg by mouth nightly  atorvastatin (LIPITOR) 40 MG tablet, Take 40 mg by mouth nightly  metoprolol tartrate (LOPRESSOR) 25 MG tablet, Take 25 mg by mouth in the morning and 25 mg before bedtime. vitamin D (CHOLECALCIFEROL) 1000 UNIT TABS tablet, Take 1,000 Units by mouth every morning    Allergies:    Patient has no known allergies. Social History:    reports that he has quit smoking. He quit smokeless tobacco use about 52 years ago. He reports current alcohol use. He reports that he does not use drugs. Family History:   family history is not on file. REVIEW OF SYSTEMS:  As above in the HPI, otherwise negative    PHYSICAL EXAM:    Vitals:  /72   Pulse 64   Temp 99 °F (37.2 °C) (Temporal)   Resp 18   Ht 6' 1\" (1.854 m)   Wt 185 lb 7 oz (84.1 kg)   SpO2 98%   BMI 24.47 kg/m²     General:  Awake, alert, oriented X 3. Well developed, well nourished, well groomed. No apparent distress. HEENT:  Normocephalic, atraumatic. Pupils equal, round, reactive to light. No scleral icterus. No conjunctival injection. No nasal discharge. Neck:  Supple  Heart:  RRR, no murmurs, gallops, rubs  Lungs:  CTA bilaterally, bilat symmetrical expansion, no wheeze, rales, or rhonchi  Abdomen:   Bowel sounds present, soft, nontender, no masses, no organomegaly, no peritoneal signs  Extremities:  No clubbing, cyanosis, or edema  Skin:  Warm and dry, no open lesions or rash  Neuro:  Cranial nerves 2-12 intact, no focal deficits  Breast: deferred  Rectal: deferred  Genitalia:  deferred    LABS:        ASSESSMENT:      Principal Problem:    Generalized weakness  Pacemaker in situ  Pancytopenia appears somewhat worse  Patient was recently on Bactrim which he may have been the culprit  Paroxysmal atrial fibrillation currently in sinus rhythm/apparently he was told that he did not require any anticoagulants as a clip was placed  Known history of smoldering myeloma never treated/being followed at [facility]  Prostatic cancer status post radiation implanted therapy  Coronary artery disease status post bypass surgery  Comorbidities present and past as listed below  Patient Active Problem List   Diagnosis    Hypertension    ED (erectile dysfunction)    Type 2 diabetes mellitus without complication (HCC)    Hx of colonic polyps    Chronic gout    MGUS (monoclonal gammopathy of unknown significance)    Near syncope    Aortic stenosis, mild    Mild dilation of ascending aorta (HCC)    Cardiac pacemaker in situ    Aortocoronary bypass status    H/O mitral valve repair    H/O aortic valve replacement    Pure hypercholesterolemia    Rectal bleed    GI bleed    Acute respiratory failure due to COVID-19 [facility])    Generalized weakness             PLAN:  Check stool for occult blood  Placement to subacute rehab  EP and hematology consults/input appreciated  DVT prophylaxis with Lovenox  Resume home medications  Questions answered to their satisfaction    Adriana Ames MD  4:05 PM  11/19/2022

## 2022-11-19 NOTE — PROGRESS NOTES
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 Status post I-125 seed implantation on 08/22/2019 treated at [facility]. PMH also with diabetes and HTN. Patient presented to the ED for evaluation of weakness and fatigue. Recently treated for UTI with Bactrim. CTAP with no acute abnormality seen in the abdomen pelvis. Severe distal colonic diverticulosis without diverticulitis. Neurology following for history of prostate cancer. No  interventions at this time. PSA 0.07. Patient was found to be leukopenic WBC 1.9, ANC 1. 19. Also anemic Hgb at 10.4 .0. Platelet 91. Today's WBC 3.8 ANC is now normal at 3.12. Consultation for evaluation of leukopenia.       Diagnostic Data:     Past Medical History:   Diagnosis Date    Diabetes mellitus ([facility] [address]     states glucose is running normal    Erectile dysfunction     Gout     Hypertension     states doing well    Vitamin D deficiency        Patient Active Problem List    Diagnosis Date Noted    Generalized weakness 11/17/2022    Acute respiratory failure due to COVID-19 [facility]) 07/28/2022    Rectal bleed 10/09/2020    GI bleed 10/09/2020    Cardiac pacemaker in situ 06/24/2020    Aortocoronary bypass status 06/24/2020    H/O mitral valve repair 06/24/2020    H/O aortic valve replacement 06/24/2020    Pure hypercholesterolemia 06/24/2020    Aortic stenosis, mild 05/07/2019    Mild dilation of ascending aorta ([facility] [address] 05/07/2019    Near syncope 05/05/2019    MGUS (monoclonal gammopathy of unknown significance) 03/28/2017    Chronic gout 09/28/2016    Type 2 diabetes mellitus without complication ([facility] 33/89/0640    Hx of colonic polyps 04/17/2016    Hypertension 04/14/2016    ED (erectile dysfunction) 04/14/2016        Past Surgical History:   Procedure Laterality Date    APPENDECTOMY  1967    CATARACT REMOVAL WITH IMPLANT Left 6/6/13    CATARACT REMOVAL WITH IMPLANT  10/31/13    [facility] REPAIR  1970    [facility]    TONSILLECTOMY         Family History  History reviewed. No pertinent family history. Social History    TOBACCO:   reports that he has quit smoking. He quit smokeless tobacco use about 52 years ago. ETOH:   reports current alcohol use. Home Medications  Prior to Admission medications    Medication Sig Start Date End Date Taking? Authorizing Provider   hydroCHLOROthiazide (HYDRODIURIL) 25 MG tablet Take 25 mg by mouth in the morning and 25 mg at noon and 25 mg before bedtime. Historical Provider, MD   famotidine (PEPCID) 20 MG tablet Take 1 tablet by mouth in the morning for 21 days. 7/30/22 8/20/22  Bret Norton MD   ascorbic acid (V-R VITAMIN C) 250 MG tablet Take 1 tablet by mouth in the morning. 7/30/22 8/29/22  Bret Norton MD   aspirin EC 81 MG EC tablet Take 1 tablet by mouth in the morning for 21 days.  7/30/22 8/20/22  Bret Norton MD   lisinopril (PRINIVIL;ZESTRIL) 40 MG tablet Take 40 mg by mouth every morning    Historical Provider, MD   metFORMIN (GLUCOPHAGE) 500 MG tablet Take 500 mg by mouth every morning    Historical Provider, MD   Multiple Vitamins-Minerals (MENS 50+ MULTI VITAMIN/MIN) TABS Take 1 tablet by mouth every morning    Historical Provider, MD   tamsulosin (FLOMAX) 0.4 MG capsule Take 0.4 mg by mouth nightly    Historical Provider, MD   atorvastatin (LIPITOR) 40 MG tablet Take 40 mg by mouth nightly Historical Provider, MD   metoprolol tartrate (LOPRESSOR) 25 MG tablet Take 25 mg by mouth in the morning and 25 mg before bedtime. Historical Provider, MD   vitamin D (CHOLECALCIFEROL) 1000 UNIT TABS tablet Take 1,000 Units by mouth every morning    Historical Provider, MD       Allergies  No Known Allergies    Review of Systems: Weakness and fatigue, left elbow pain. Objective  /72   Pulse 64   Temp 99 °F (37.2 °C) (Temporal)   Resp 18   Ht 6' 1\" (1.854 m)   Wt 185 lb 7 oz (84.1 kg)   SpO2 98%   BMI 24.47 kg/m²     Physical Exam:   Performance Status:  General: AAO to person, place, time, in no acute distress,   Head and neck : PERRLA, EOMI . Sclera non icteric. Oropharynx : Clear  Neck: no JVD,  no adenopathy  Heart: Regular rate and regular rhythm,   Lungs: Clear to auscultation   Extremities: No edema,no cyanosis,   Abdomen: Soft, non-tender;no masses, no organomegaly  Skin:  No rash. Neurologic:Cranial nerves grossly intact. No focal motor or sensory deficits .     Recent Laboratory Data-   Lab Results   Component Value Date    WBC 3.4 (L) 11/19/2022    HGB 9.5 (L) 11/19/2022    HCT 28.3 (L) 11/19/2022    HCT 28.1 (L) 11/19/2022    MCV 98.3 11/19/2022    PLT 88 (L) 11/19/2022    LYMPHOPCT 29.7 11/19/2022    RBC 2.88 (L) 11/19/2022    MCH 33.0 11/19/2022    MCHC 33.6 11/19/2022    RDW 14.4 11/19/2022    NEUTOPHILPCT 53.1 11/19/2022    MONOPCT 13.1 (H) 11/19/2022    BASOPCT 0.3 11/19/2022    NEUTROABS 1.83 11/19/2022    LYMPHSABS 1.02 (L) 11/19/2022    MONOSABS 0.45 11/19/2022    EOSABS 0.12 11/19/2022    BASOSABS 0.01 11/19/2022       Lab Results   Component Value Date     11/19/2022    K 4.5 11/19/2022     11/19/2022    CO2 22 11/19/2022    BUN 27 (H) 11/19/2022    CREATININE 1.3 (H) 11/19/2022    GLUCOSE 86 11/19/2022    CALCIUM 8.5 (L) 11/19/2022    PROT 6.2 (L) 11/19/2022    LABALBU 3.5 11/19/2022    BILITOT 0.7 11/19/2022    ALKPHOS 106 11/19/2022    AST 38 11/19/2022 ALT 35 11/19/2022    LABGLOM 54 11/19/2022    GFRAA >60 07/30/2022       Lab Results   Component Value Date    IRON 16 (L) 11/19/2022    TIBC 197 (L) 11/19/2022    FERRITIN 585 11/19/2022           Radiology-    CT ABDOMEN PELVIS WO CONTRAST Additional Contrast? None    Result Date: 11/17/2022  EXAMINATION: CT OF THE ABDOMEN AND PELVIS WITHOUT CONTRAST 11/17/2022 4:35 pm TECHNIQUE: CT of the abdomen and pelvis was performed without the administration of intravenous contrast. Multiplanar reformatted images are provided for review. Automated exposure control, iterative reconstruction, and/or weight based adjustment of the mA/kV was utilized to reduce the radiation dose to as low as reasonably achievable. COMPARISON: CT of the abdomen and pelvis, 07/28/2022. HISTORY: ORDERING SYSTEM PROVIDED HISTORY: UTI TECHNOLOGIST PROVIDED HISTORY: Reason for exam:->UTI Additional Contrast?->None What reading provider will be dictating this exam?->CRC FINDINGS: Lower Chest: The heart is mildly enlarged. Pacemaker in situ. Mild subpleural scarring is seen in the lungs. There is mild interstitial prominence likely due to edema. Organs: Liver: Unremarkable. Gallbladder: Unremarkable. Pancreas: Mildly atrophied. Otherwise, unremarkable. Spleen:  Unremarkable. Adrenals: Unremarkable. Kidneys: Multiple right renal cysts are seen. No stone or hydronephrosis. GI/Bowel: Severe distal colonic diverticulosis without diverticulitis. No bowel wall thickening or obstruction. Status post appendectomy. Pelvis: The urinary bladder is grossly unremarkable. Brachytherapy seeds are seen in the prostate gland. Peritoneum/Retroperitoneum: Prominent calcified atherosclerosis is seen in the aorta. No aneurysm. No lymphadenopathy. No free air or free fluid is seen. 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 Bones/Soft Tissues: The visualized bones are intact without fracture or focal lesion. Prominent loss of disc height at L3-4.     1.  No acute abnormality is seen in the abdomen or the pelvis. 2. Severe distal colonic diverticulosis without diverticulitis. 3. Mild cardiomegaly. XR CHEST PORTABLE    Result Date: 11/17/2022  EXAMINATION: ONE XRAY VIEW OF THE CHEST 11/17/2022 10:38 am COMPARISON: None. HISTORY: ORDERING SYSTEM PROVIDED HISTORY: weakness TECHNOLOGIST PROVIDED HISTORY: Reason for exam:->weakness What reading provider will be dictating this exam?->CRC FINDINGS: The cardiac silhouette is within normals. Postop sternotomy changes are noted. There is a dual lead cardiac pacer on the left The lungs are clear. There is no focal infiltrate or effusion. 1. There is no acute cardiopulmonary disease. 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 Adenocarcinoma of the prostate, initial PSA of 15.4 ng/mL, [place] of 3+4=7. Status post I-125 seed implantation on 08/22/2019 treated at [facility]. PMH also with diabetes and HTN. - Evaluation of leukopenia.    - Weakness and fatigue. Recently treated for UTI with Bactrim. - CTAP with no acute abnormality seen in the abdomen pelvis. Severe distal colonic diverticulosis without diverticulitis. Neurology following for history of prostate cancer. No  interventions at this time. - PSA 0.07.    - leukopenic WBC 1.9, ANC 1. 19. Also anemic Hgb at 10.4 .0. Platelet 91. Today's WBC 3.8 ANC is now normal at 3.12. Attending addendum. Leukopenia likely related to myelosuppression by recent intake of Bactrim. His WBC count today is improved with normal ANC.   Has residual lymphopenia  Recent drop in hemoglobin also likely related to myelosuppression by recent UTI and intake of Bactrim  Chronic moderate thrombocytopenia likely related to decreased marrow reserve from prior radiation seeds for prostate cancer    Smoldering multiple myeloma has not required any treatment  To check SPEP, IEP, LDH, quantitative immunoglobulins, random UPEP and serum FLC's  We will follow as outpatient    11/19/22  Feeling better improved and anxious for discharge. Transient leukopenia likely related to myelosuppression by recent intake of Bactrim. WBC count improved and is today 3.4 with normal ANC of 1.83. He has moderate absolute lymphopenia. Platelet count stable at 88. Patient with chronic normocytic anemia  Iron studies consistent with anemia of chronic disease with low serum iron low saturation and elevated ferritin. B12 and folate are normal.  SPEP/IEP/LDH/UPEP and serum FLC's are pending.     His chronic thrombocytopenia is moderate and likely related to decreased marrow reserve from prior radiation seeds for prostate cancer  Seen by EPS and cardiology cleared him for discharge  May be discharged when okay with hospitalist, we will follow as outpatient  He is being transferred to [facility] for rehab    Alma Rosa Ryan MD  Electronically signed 11/19/2022 at 11:28 AM

## 2022-11-20 LAB
ALBUMIN SERPL-MCNC: 3.8 G/DL (ref 3.5–5.2)
ALP BLD-CCNC: 119 U/L (ref 40–129)
ALT SERPL-CCNC: 41 U/L (ref 0–40)
ANION GAP SERPL CALCULATED.3IONS-SCNC: 12 MMOL/L (ref 7–16)
AST SERPL-CCNC: 37 U/L (ref 0–39)
BASOPHILS ABSOLUTE: 0.03 E9/L (ref 0–0.2)
BASOPHILS RELATIVE PERCENT: 0.9 % (ref 0–2)
BILIRUB SERPL-MCNC: 0.8 MG/DL (ref 0–1.2)
BUN BLDV-MCNC: 29 MG/DL (ref 6–23)
CALCIUM SERPL-MCNC: 9.1 MG/DL (ref 8.6–10.2)
CHLORIDE BLD-SCNC: 104 MMOL/L (ref 98–107)
CO2: 21 MMOL/L (ref 22–29)
CREAT SERPL-MCNC: 1.2 MG/DL (ref 0.7–1.2)
EOSINOPHILS ABSOLUTE: 0.27 E9/L (ref 0.05–0.5)
EOSINOPHILS RELATIVE PERCENT: 7.8 % (ref 0–6)
GFR SERPL CREATININE-BSD FRML MDRD: 59 ML/MIN/1.73
GLUCOSE BLD-MCNC: 108 MG/DL (ref 74–99)
HCT VFR BLD CALC: 32.2 % (ref 37–54)
HEMOGLOBIN: 10.7 G/DL (ref 12.5–16.5)
IMMATURE GRANULOCYTES #: 0.01 E9/L
IMMATURE GRANULOCYTES %: 0.3 % (ref 0–5)
LYMPHOCYTES ABSOLUTE: 1.36 E9/L (ref 1.5–4)
LYMPHOCYTES RELATIVE PERCENT: 39.1 % (ref 20–42)
MCH RBC QN AUTO: 33 PG (ref 26–35)
MCHC RBC AUTO-ENTMCNC: 33.2 % (ref 32–34.5)
MCV RBC AUTO: 99.4 FL (ref 80–99.9)
MONOCYTES ABSOLUTE: 0.35 E9/L (ref 0.1–0.95)
MONOCYTES RELATIVE PERCENT: 10.1 % (ref 2–12)
NEUTROPHILS ABSOLUTE: 1.46 E9/L (ref 1.8–7.3)
NEUTROPHILS RELATIVE PERCENT: 41.8 % (ref 43–80)
PDW BLD-RTO: 14.2 FL (ref 11.5–15)
PLATELET # BLD: 111 E9/L (ref 130–450)
PMV BLD AUTO: 10.6 FL (ref 7–12)
POTASSIUM SERPL-SCNC: 5 MMOL/L (ref 3.5–5)
RBC # BLD: 3.24 E12/L (ref 3.8–5.8)
SODIUM BLD-SCNC: 137 MMOL/L (ref 132–146)
TOTAL PROTEIN: 7.1 G/DL (ref 6.4–8.3)
WBC # BLD: 3.5 E9/L (ref 4.5–11.5)

## 2022-11-20 PROCEDURE — 80053 COMPREHEN METABOLIC PANEL: CPT

## 2022-11-20 PROCEDURE — 6370000000 HC RX 637 (ALT 250 FOR IP): Performed by: INTERNAL MEDICINE

## 2022-11-20 PROCEDURE — 85025 COMPLETE CBC W/AUTO DIFF WBC: CPT

## 2022-11-20 PROCEDURE — 36415 COLL VENOUS BLD VENIPUNCTURE: CPT

## 2022-11-20 PROCEDURE — 2140000000 HC CCU INTERMEDIATE R&B

## 2022-11-20 PROCEDURE — 84166 PROTEIN E-PHORESIS/URINE/CSF: CPT

## 2022-11-20 PROCEDURE — 97530 THERAPEUTIC ACTIVITIES: CPT

## 2022-11-20 PROCEDURE — 86334 IMMUNOFIX E-PHORESIS SERUM: CPT

## 2022-11-20 PROCEDURE — 6360000002 HC RX W HCPCS: Performed by: INTERNAL MEDICINE

## 2022-11-20 RX ADMIN — METOPROLOL TARTRATE 25 MG: 25 TABLET, FILM COATED ORAL at 20:36

## 2022-11-20 RX ADMIN — TAMSULOSIN HYDROCHLORIDE 0.4 MG: 0.4 CAPSULE ORAL at 20:36

## 2022-11-20 RX ADMIN — LISINOPRIL 40 MG: 20 TABLET ORAL at 09:51

## 2022-11-20 RX ADMIN — ATORVASTATIN CALCIUM 40 MG: 40 TABLET, FILM COATED ORAL at 20:36

## 2022-11-20 RX ADMIN — METOPROLOL TARTRATE 25 MG: 25 TABLET, FILM COATED ORAL at 09:50

## 2022-11-20 RX ADMIN — FAMOTIDINE 20 MG: 20 TABLET ORAL at 09:51

## 2022-11-20 RX ADMIN — ENOXAPARIN SODIUM 40 MG: 100 INJECTION SUBCUTANEOUS at 09:50

## 2022-11-20 RX ADMIN — METFORMIN HYDROCHLORIDE 500 MG: 500 TABLET ORAL at 09:50

## 2022-11-20 RX ADMIN — Medication 1000 UNITS: at 09:50

## 2022-11-20 NOTE — PROGRESS NOTES
Physical Therapy  Physical Therapy Treatment    Name: Shanna Weiss  : 1940  MRN: [id number]      Date of Service: 2022    Evaluating PT:  Cassidy Carlin, PT, DORIAN [id number]    Room #:  5517/9896-O  Diagnosis:  Generalized weakness [R53.1]  Elevated troponin [R77.8]  PMHx/PSHx:    Past Medical History:   Diagnosis Date    Diabetes mellitus ([address]     states glucose is running normal    Erectile dysfunction     Gout     Hypertension     states doing well    Vitamin D deficiency      Precautions:  Fall risk, Alarms, Pacemaker   Equipment Needs:  FWW    SUBJECTIVE:  Pt lives with wife in a 1.5 story home with two steps to enter with one rail from garage. Bed and bathroom on first level with walk in shower with built in seat and grab bars around toilet. Equipment Owned: Standard Walker     OBJECTIVE:   Initial Evaluation  Date: 22 Treatment  22 Short Term/ Long Term   Goals   AM-PAC 6 Clicks 69/44     Was pt agreeable to Eval/treatment? Yes yes    Does pt have pain? None No c/o pain    Bed Mobility  Rolling: SBA  Supine to sit: Max A  Sit to supine: Min A  Scooting: Max A  Rolling: SBA  Supine to sit: Cynthia  Sit to supine: NT  Scooting: SBA  Supervision   Transfers Sit to stand:  Mod A  Stand to sit: Mod A  Stand pivot: Min A with FWW Sit to stand: Cynthia  Stand to sit: Cynthia  Stand pivot: Cynthia with FWW Supervision with FWW   Ambulation    25 feet with FWW Min A 10 feet and 50 feet x2 with [employer] Cynthia >50 feet with FWW Supervision   Stair negotiation: ascended and descended  NT  NT 2 steps with unilateral rail SBA   ROM BUE:  AROM WFL  BLE:  AROM WFL     Strength BUE:  Refer to OT   BLE:  4+/5 B LE   5/5   Balance Sitting EOB:  static Min progressing to Mod, dynamic Mod progressing to Max   Dynamic Standing:  Min A with FWW  Sitting EOB: SBA  Dynamic Standing:  Cynthia with FWW  Sitting EOB:  Supervision   Dynamic Standing:  Supervision with FWW   Pt is A & O x 4  Sensation:  NT  Edema:  none noted    Patient education  Pt educated on role of PT, safety during mobility, PT POC during hospital stay    Patient response to education:   Pt verbalized understanding Pt demonstrated skill Pt requires further education in this area   yes yes yes     ASSESSMENT:    Comments:  patient semi-supine in bed upon entry and agreeable to PT treatment. Pt able to complete bed mobility with elevated HOB and light assist to complete task. Pt requested to use restroom in room. Pt able to stand and ambulate with light assist and [employer]. Time provided for pt to void in seated. Pt able to stand at sink for hand hygiene. Good safety with [employer] noted. Further ambulation completed in cristina with WW. Mild flexed posture noted. Pt returned to room and left seated in chair at end of session with all needs met. Treatment:  Patient practiced and was instructed in the following treatment:    Bed Mobility: VCs provided for sequencing and safety during mobility. Manual assist provided for completion of task. Transfer Training: Verbal and tactile cueing provided for sequencing and safety during mobility. Manual assist provided for completion of task  Gait Training: Ambulation with WW and verbal cues for proper technique and safety. Manual assist provided for completion of task     PLAN:    Patient is making good progress towards established goals. Will continue with current POC.       Time in  0856  Time out  0912    Total Treatment Time  16 minutes     CPT codes:  [] Gait training 61592 - minutes  [] Manual therapy 23556 - minutes  [x] Therapeutic activities 96187 16 minutes  [] Therapeutic exercises 96118 - minutes  [] Neuromuscular reeducation 07649 - minutes    Kinza Garsia PT, DPT  [id number]

## 2022-11-20 NOTE — PROGRESS NOTES
Александр Dave MD FACP  Internal medicine  Progress Notes      CHIEF COMPLAINT: Fatigue      HISTORY OF PRESENT ILLNESS:    11/18/2022  Patient was seen on the floor earlier this morning at the main campus of [facility] with the ER physician at the time of admission  Wife was at bedside  Data reviewed in detail   78-year-old  man has not been feeling well for the last few days with extreme fatigue  Yesterday he lowered himself to the floor thinking that he will have a syncopal episode  No chest pain diaphoresis or shortness of breath  He was unable to get up after that  EMS was called  Patient was brought to emergency room  Admitted for observation  11/19/2022  Patient was seen on the floor earlier this morning  Wife at bedside  Data reviewed in detail  He is feeling weak and tired otherwise no specific complaints  11/20/2022  Patient was seen on the floor earlier this morning  Ambulating with occupational therapist  Data reviewed in detail  No new complaints    Past Medical History:    Past Medical History:   Diagnosis Date    Diabetes mellitus ([address]     states glucose is running normal    Erectile dysfunction     Gout     Hypertension     states doing well    Vitamin D deficiency        Past Surgical History:    Past Surgical History:   Procedure Laterality Date    APPENDECTOMY  1967    CATARACT REMOVAL WITH IMPLANT Left 6/6/13    CATARACT REMOVAL WITH IMPLANT  10/31/13    HERNIA REPAIR  1970    [facility]    TONSILLECTOMY         Medications Prior to Admission:    Medications Prior to Admission: hydroCHLOROthiazide (HYDRODIURIL) 25 MG tablet, Take 25 mg by mouth in the morning and 25 mg at noon and 25 mg before bedtime. famotidine (PEPCID) 20 MG tablet, Take 1 tablet by mouth in the morning for 21 days. ascorbic acid (V-R VITAMIN C) 250 MG tablet, Take 1 tablet by mouth in the morning. aspirin EC 81 MG EC tablet, Take 1 tablet by mouth in the morning for 21 days.   lisinopril (PRINIVIL;ZESTRIL) 40 MG tablet, Take 40 mg by mouth every morning  metFORMIN (GLUCOPHAGE) 500 MG tablet, Take 500 mg by mouth every morning  Multiple Vitamins-Minerals (MENS 50+ MULTI VITAMIN/MIN) TABS, Take 1 tablet by mouth every morning  tamsulosin (FLOMAX) 0.4 MG capsule, Take 0.4 mg by mouth nightly  atorvastatin (LIPITOR) 40 MG tablet, Take 40 mg by mouth nightly  metoprolol tartrate (LOPRESSOR) 25 MG tablet, Take 25 mg by mouth in the morning and 25 mg before bedtime. vitamin D (CHOLECALCIFEROL) 1000 UNIT TABS tablet, Take 1,000 Units by mouth every morning    Allergies:    Patient has no known allergies. Social History:    reports that he has quit smoking. He quit smokeless tobacco use about 52 years ago. He reports current alcohol use. He reports that he does not use drugs. Family History:   family history is not on file. REVIEW OF SYSTEMS:  As above in the HPI, otherwise negative    PHYSICAL EXAM:    Vitals:  /62   Pulse 60   Temp 97.8 °F (36.6 °C) (Temporal)   Resp 16   Ht 6' 1\" (1.854 m)   Wt 185 lb 7 oz (84.1 kg)   SpO2 97%   BMI 24.47 kg/m²     General:  Awake, alert, oriented X 3. Well developed, well nourished, well groomed. No apparent distress. HEENT:  Normocephalic, atraumatic. Pupils equal, round, reactive to light. No scleral icterus. No conjunctival injection. No nasal discharge. Neck:  Supple  Heart:  RRR, no murmurs, gallops, rubs  Lungs:  CTA bilaterally, bilat symmetrical expansion, no wheeze, rales, or rhonchi  Abdomen:   Bowel sounds present, soft, nontender, no masses, no organomegaly, no peritoneal signs  Extremities:  No clubbing, cyanosis, or edema  Skin:  Warm and dry, no open lesions or rash  Neuro:  Cranial nerves 2-12 intact, no focal deficits  Breast: deferred  Rectal: deferred  Genitalia:  deferred    LABS:        ASSESSMENT:      Principal Problem:    Generalized weakness  Pacemaker in situ  Pancytopenia appears somewhat worse  Patient was recently on Bactrim which he may have been the culprit  Paroxysmal atrial fibrillation currently in sinus rhythm/apparently he was told that he did not require any anticoagulants as a clip was placed  Known history of smoldering myeloma never treated/being followed at [facility]  Prostatic cancer status post radiation implanted therapy  Coronary artery disease status post bypass surgery  Comorbidities present and past as listed below  Patient Active Problem List   Diagnosis    Hypertension    ED (erectile dysfunction)    Type 2 diabetes mellitus without complication (HCC)    Hx of colonic polyps    Chronic gout    MGUS (monoclonal gammopathy of unknown significance)    Near syncope    Aortic stenosis, mild    Mild dilation of ascending aorta (HCC)    Cardiac pacemaker in situ    Aortocoronary bypass status    H/O mitral valve repair    H/O aortic valve replacement    Pure hypercholesterolemia    Rectal bleed    GI bleed    Acute respiratory failure due to COVID-19 [facility])    Generalized weakness             PLAN:  Check stool for occult blood  Placement to subacute rehab  EP and hematology consults/input appreciated  DVT prophylaxis with Lovenox  Resume home medications  Questions answered to their satisfaction    Tracy Ham MD  5:26 PM  11/20/2022

## 2022-11-21 VITALS
OXYGEN SATURATION: 100 % | SYSTOLIC BLOOD PRESSURE: 140 MMHG | HEART RATE: 72 BPM | HEIGHT: 73 IN | BODY MASS INDEX: 24.58 KG/M2 | TEMPERATURE: 98.2 F | WEIGHT: 185.44 LBS | RESPIRATION RATE: 18 BRPM | DIASTOLIC BLOOD PRESSURE: 82 MMHG

## 2022-11-21 LAB
ACANTHOCYTES: ABNORMAL
ADDENDUM ELECTROPHORESIS URINE RANDOM: NORMAL
ALBUMIN SERPL-MCNC: 2.7 G/DL (ref 3.5–4.7)
ALBUMIN SERPL-MCNC: 3.7 G/DL (ref 3.5–5.2)
ALP BLD-CCNC: 120 U/L (ref 40–129)
ALPHA-1-GLOBULIN: 0.4 G/DL (ref 0.2–0.4)
ALPHA-2-GLOBULIN: 0.6 G/DL (ref 0.5–1)
ALT SERPL-CCNC: 47 U/L (ref 0–40)
ANION GAP SERPL CALCULATED.3IONS-SCNC: 5 MMOL/L (ref 7–16)
ANISOCYTOSIS: ABNORMAL
AST SERPL-CCNC: 37 U/L (ref 0–39)
BASOPHILS ABSOLUTE: 0.15 E9/L (ref 0–0.2)
BASOPHILS RELATIVE PERCENT: 3.6 % (ref 0–2)
BETA GLOBULIN: 0.8 G/DL (ref 0.8–1.3)
BILIRUB SERPL-MCNC: 0.8 MG/DL (ref 0–1.2)
BUN BLDV-MCNC: 22 MG/DL (ref 6–23)
BURR CELLS: ABNORMAL
CALCIUM SERPL-MCNC: 8.9 MG/DL (ref 8.6–10.2)
CHLORIDE BLD-SCNC: 107 MMOL/L (ref 98–107)
CO2: 24 MMOL/L (ref 22–29)
CREAT SERPL-MCNC: 0.9 MG/DL (ref 0.7–1.2)
ELECTROPHORESIS: ABNORMAL
EOSINOPHILS ABSOLUTE: 0.07 E9/L (ref 0.05–0.5)
EOSINOPHILS RELATIVE PERCENT: 1.8 % (ref 0–6)
GAMMA GLOBULIN: 1.5 G/DL (ref 0.7–1.6)
GFR SERPL CREATININE-BSD FRML MDRD: >60 ML/MIN/1.73
GLUCOSE BLD-MCNC: 84 MG/DL (ref 74–99)
HCT VFR BLD CALC: 29.6 % (ref 37–54)
HEMOGLOBIN: 10.1 G/DL (ref 12.5–16.5)
IGA: 159 MG/DL (ref 70–400)
IGG: 1239 MG/DL (ref 700–1600)
IGM: 43 MG/DL (ref 40–230)
IMMUNOFIXATION RESULT, SERUM: NORMAL
IMMUNOFIXATION URINE: NORMAL
KAPPA FREE LIGHT CHAINS QNT: 84.49 MG/L (ref 3.3–19.4)
KAPPA/LAMBDA FREE LIGHT CHAIN RATIO: 1.56 (ref 0.26–1.65)
LAMBDA FREE LIGHT CHAINS QNT: 53.99 MG/L (ref 5.71–26.3)
LYMPHOCYTES ABSOLUTE: 1.39 E9/L (ref 1.5–4)
LYMPHOCYTES RELATIVE PERCENT: 33.9 % (ref 20–42)
MCH RBC QN AUTO: 33.6 PG (ref 26–35)
MCHC RBC AUTO-ENTMCNC: 34.1 % (ref 32–34.5)
MCV RBC AUTO: 98.3 FL (ref 80–99.9)
MONOCYTES ABSOLUTE: 0.33 E9/L (ref 0.1–0.95)
MONOCYTES RELATIVE PERCENT: 8 % (ref 2–12)
NEUTROPHILS ABSOLUTE: 2.17 E9/L (ref 1.8–7.3)
NEUTROPHILS RELATIVE PERCENT: 52.7 % (ref 43–80)
OVALOCYTES: ABNORMAL
PDW BLD-RTO: 13.9 FL (ref 11.5–15)
PLATELET # BLD: 119 E9/L (ref 130–450)
PMV BLD AUTO: 11.5 FL (ref 7–12)
POIKILOCYTES: ABNORMAL
POLYCHROMASIA: ABNORMAL
POTASSIUM SERPL-SCNC: 4.6 MMOL/L (ref 3.5–5)
RBC # BLD: 3.01 E12/L (ref 3.8–5.8)
SODIUM BLD-SCNC: 136 MMOL/L (ref 132–146)
TOTAL PROTEIN: 6 G/DL (ref 6.4–8.3)
TOTAL PROTEIN: 6.5 G/DL (ref 6.4–8.3)
WBC # BLD: 4.1 E9/L (ref 4.5–11.5)

## 2022-11-21 PROCEDURE — 6360000002 HC RX W HCPCS: Performed by: INTERNAL MEDICINE

## 2022-11-21 PROCEDURE — 80053 COMPREHEN METABOLIC PANEL: CPT

## 2022-11-21 PROCEDURE — 36415 COLL VENOUS BLD VENIPUNCTURE: CPT

## 2022-11-21 PROCEDURE — 85025 COMPLETE CBC W/AUTO DIFF WBC: CPT

## 2022-11-21 PROCEDURE — 6370000000 HC RX 637 (ALT 250 FOR IP): Performed by: INTERNAL MEDICINE

## 2022-11-21 RX ORDER — FUROSEMIDE 20 MG/1
20 TABLET ORAL DAILY
Status: ON HOLD | COMMUNITY
End: 2022-11-21 | Stop reason: HOSPADM

## 2022-11-21 RX ORDER — AMLODIPINE BESYLATE 5 MG/1
5 TABLET ORAL DAILY
Qty: 30 TABLET | Refills: 3 | Status: SHIPPED | OUTPATIENT
Start: 2022-11-21

## 2022-11-21 RX ORDER — AMLODIPINE BESYLATE 5 MG/1
5 TABLET ORAL DAILY
Status: DISCONTINUED | OUTPATIENT
Start: 2022-11-21 | End: 2022-11-21 | Stop reason: HOSPADM

## 2022-11-21 RX ADMIN — LISINOPRIL 40 MG: 20 TABLET ORAL at 09:44

## 2022-11-21 RX ADMIN — METOPROLOL TARTRATE 25 MG: 25 TABLET, FILM COATED ORAL at 09:44

## 2022-11-21 RX ADMIN — METFORMIN HYDROCHLORIDE 500 MG: 500 TABLET ORAL at 09:44

## 2022-11-21 RX ADMIN — ENOXAPARIN SODIUM 40 MG: 100 INJECTION SUBCUTANEOUS at 09:44

## 2022-11-21 RX ADMIN — Medication 1000 UNITS: at 09:44

## 2022-11-21 RX ADMIN — AMLODIPINE BESYLATE 5 MG: 5 TABLET ORAL at 13:50

## 2022-11-21 RX ADMIN — FAMOTIDINE 20 MG: 20 TABLET ORAL at 09:44

## 2022-11-21 NOTE — CARE COORDINATION
11/21 Care Coordination: [facility] notified of discharge and that [facility] orders are in [address] CM/SW will continue to follow for discharge planning.    Laine PORTERN,RN--BC  [phone]

## 2022-11-21 NOTE — PROGRESS NOTES
Patients blood pressure was 170/70 at 2330. Spoke with Dr. Carlos Martins via phone to notify blood pressure. No orders given at this time.

## 2022-11-21 NOTE — CARE COORDINATION
11/21, MICAH met with patient and wife in room. Discussed Dr saying that there is not a medical reason for a transfer to a higher level of  [facility]. Patient could discharge then go up to the clinic. Patient would like his BP taken and will decide if he is going home with [facility] or up to [facility] by family transport. Patient is aware of the cost for transport out of pocket should patient go from this hospital to the [facility]. Patient is aware of precert being obtained for him to go to [facility] for rehab. Patient is still wanting BP taken and will decide his discharge plan. Nursing has been informed of the above. MICAH/ANA to follow for further discharge planning needs.       TRACE Plata  [facility] Case Management  [phone]

## 2022-11-21 NOTE — CARE COORDINATION
11/21, MICAH met with patient and wife in room. Patient and wife are wanting to go up to the [facility]. Per wife of patient  was informed earlier of patient wanting to go to the Clinic instead of [facility] for rehab. Patient is wanting to go to the clinic and not to [facility] for rehab. Discussed if patient would go by ambulance this would be a self pay due to patient requesting to go to the Clinic. Wife says she would be able to get the patient up to the clinic by her own transportation. Updated [place] from [facility] on discharge plan. Precert has been obtained. Will wait to speak with  to confirm plan is to the [facility]. Will updated [place] on final discharge plan. MICAH/ANA to follow.       Vivian Ruvalcaba, [facility] Case Management  [phone]

## 2022-11-22 LAB
BETA-2 MICROGLOBULIN: 7.6 MG/L (ref 0.6–2.4)
BLOOD CULTURE, ROUTINE: NORMAL
CULTURE, BLOOD 2: NORMAL

## 2022-12-04 NOTE — DISCHARGE SUMMARY
Physician Discharge Summary     Patient ID:  Kj Littlejohn  [id number]  42 y.o.  1940    Admit date: 11/17/2022    Discharge date and time: 11/21/2022  3:20 PM     Admission Diagnoses: Generalized weakness [R53.1]  Elevated troponin [R77.8]    Discharge Diagnoses:   Ambulatory dysfunction  Pancytopenia  Uncontrolled hypertension    Patient Active Problem List   Diagnosis    Hypertension    ED (erectile dysfunction)    Type 2 diabetes mellitus without complication (HCC)    Hx of colonic polyps    Chronic gout    MGUS (monoclonal gammopathy of unknown significance)    Near syncope    Aortic stenosis, mild    Mild dilation of ascending aorta (HCC)    Cardiac pacemaker in situ    Aortocoronary bypass status    H/O mitral valve repair    H/O aortic valve replacement    Pure hypercholesterolemia    Rectal bleed    GI bleed    Acute respiratory failure due to COVID-19 [facility])    Generalized weakness       Consults: EP and hematology    Procedures:     Hospital Course:   49-year-old  Admitted to radiodense room due to ambulatory dysfunction  Please review the history and physical for further details  He was admitted to monitored unit  Pacemaker was found to be functioning fairly well  He was found to be pancytopenic  Seen by hematology  It was felt to be from Bactrim use recently  Uncontrolled hypertension was noted  Amlodipine was added  Patient was discharged home in stable condition    Discharge Exam:  Patient was seen on the floor prior to discharge  He was completely asymptomatic  Ambulating well  Blood pressure stable  Examination unchanged  Data reviewed in detail with the wife at bedside    Disposition: Home  Stable at the time of discharge  Patient Instructions:   Discharge Medication List as of 11/21/2022  2:40 PM        START taking these medications    Details   amLODIPine (NORVASC) 5 MG tablet Take 1 tablet by mouth daily, Disp-30 tablet, R-3Normal           CONTINUE these medications which have NOT CHANGED Details   famotidine (PEPCID) 20 MG tablet Take 1 tablet by mouth in the morning for 21 days. , Disp-21 tablet, R-0Normal      ascorbic acid (V-R VITAMIN C) 250 MG tablet Take 1 tablet by mouth in the morning., Disp-30 tablet, R-0Normal      aspirin EC 81 MG EC tablet Take 1 tablet by mouth in the morning for 21 days. , Disp-21 tablet, R-0Normal      lisinopril (PRINIVIL;ZESTRIL) 40 MG tablet Take 40 mg by mouth every morningHistorical Med      metFORMIN (GLUCOPHAGE) 500 MG tablet Take 500 mg by mouth every morningHistorical Med      Multiple Vitamins-Minerals (MENS 50+ MULTI VITAMIN/MIN) TABS Take 1 tablet by mouth every morningHistorical Med      tamsulosin (FLOMAX) 0.4 MG capsule Take 0.4 mg by mouth nightlyHistorical Med      atorvastatin (LIPITOR) 40 MG tablet Take 40 mg by mouth nightlyHistorical Med      metoprolol tartrate (LOPRESSOR) 25 MG tablet Take 25 mg by mouth dailyHistorical Med      vitamin D (CHOLECALCIFEROL) 1000 UNIT TABS tablet Take 1,000 Units by mouth every morningHistorical Med           STOP taking these medications       furosemide (LASIX) 20 MG tablet Comments:   Reason for Stopping:         hydroCHLOROthiazide (HYDRODIURIL) 25 MG tablet Comments:   Reason for Stopping:             Activity: As tolerated  Diet: General    Follow-up with [facility] at their own request    Note that over 40 minutes was spent in preparing discharge papers, discussing discharge with patient, medication review, etc.    Signed:  Pamela Brumfield MD  12/4/2022  6:46 PM

## 2022-12-08 NOTE — PROGRESS NOTES
Physician Progress Note      PATIENT:               Laith Byrne  CSN #:                  [id number]  :                       1940  ADMIT DATE:       2022 9:53 AM  DISCH DATE:        2022 3:20 PM  RESPONDING  PROVIDER #:        Mao Becerra MD          QUERY TEXT:    Dear Dr. Brody Humphrey,    Pt admitted with weakness and ambulatory dysfunction. Pt noted to have   pancytopenia. If possible, please document in progress notes and discharge   summary the relationship, if any, between weakness and ambulatory dysfunction   and pancytopenia. 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 19. Also anemic   Hgb at 10.4 .0. Platelet 83\", Discharge summary: \". Les Pimple Les Pimple Admitted to   radiodense room due to ambulatory dysfunction. Les Pimple Les Pimple Pacemaker was found to be   functioning fairly well He was found to be pancytopenic Seen by hematology It   was felt to be from Bactrim use recently\"  Treatment: Hem/onc consult, serial labs, close pt monitoring    Thank you,  Shamar PORTERN, RN  Clinical Documentation Improvement  [email or web address]  Options provided:  -- Weakness and ambulatory dysfunction due to pancytopenia  -- Weakness and ambulatory dysfunction unrelated to pancytopenia  -- Other - I will add my own diagnosis  -- Disagree - Not applicable / Not valid  -- Disagree - Clinically unable to determine / Unknown  -- Refer to Clinical Documentation Reviewer    PROVIDER RESPONSE TEXT:    This patient has weakness and ambulatory dysfunction due to pancytopenia.     Query created by: Alfonzo Akbar on 2022 1:08 PM      Electronically signed by:  Mao Becerra MD 2022 1:46 PM

## 2022-12-22 LAB
ADDENDUM ELECTROPHORESIS URINE RANDOM: NORMAL
IMMUNOFIXATION URINE: NORMAL

## 2023-03-03 NOTE — PROGRESS NOTES
[address]   PRE-ADMISSION TESTING GENERAL INSTRUCTIONS  [facility] Phone Number: [phone]      GENERAL INSTRUCTIONS:    [x] Antibacterial Soap Shower Night before and/or AM of surgery. [x] Do not wear makeup, lotions, powders, deodorant. [x] Nothing by mouth after midnight; including gum, candy, mints, or water. [x] You may brush your teeth, gargle, but do NOT swallow water. [x] No tobacco products, illegal drugs, marijuana or alcohol within 24 hours of your surgery. [x] Jewelry or valuables should not be brought to the hospital. All body and/or tongue piercing's must be removed prior to arriving to hospital. No contact lens or hair pins. [x] Arrange transportation with a responsible adult  to and from the hospital. Arrange for someone to be with you for the remainder of the day and for 24 hours after your procedure due to having had anesthesia. -Who will be your  for transportation? Shermeaghan Manriquez, spouse        -Who will be staying with you for 24 hrs after your procedure? Shermeaghan Manriquez, spouse  [x] [employer] card and photo ID. [x] Bring copy of living will or healthcare power of  papers to be placed in your electronic record. Copy in EMR states not changes have been made       PARKING INSTRUCTIONS:     [x] ARRIVAL DATE  3/7 & TIME   6 am:   [x] Enter into the [employer]. Two people may accompany you. [x] Parking lot '\"I\"  is located on [facility] (the corner of [facility]). To enter the lot,you will need to get a parking ticket at the gate . To exit you will be given a free parking voucher. You will need both the ticket and parking voucher to exit the parking lot. One car per patient is allowed to park in this lot. Walk up the front walk to the [facility], the door will be locked an employee will greet you and let you in.                    EDUCATION INSTRUCTIONS:          [x] Pain: Post-op pain is normal and to be expected. You will be asked to rate your pain from 0-10. MEDICATION INSTRUCTIONS:    [x] Bring a complete list of your medications, please write the last time you took the medicine, give this list to the nurse in Pre-Op. [x] Take only the following medications the morning of surgery with 1-2 ounces of water:   metoprolol tartrate, amlodipine        [x] Stop all herbal supplements and vitamins 5 days before surgery. Stop ibuprofen (NSAIDS) 7 days before surgery. [x] DO NOT take any diabetic medicine the morning of surgery. Follow instructions for insulin the day before surgery. Not currently prescribed insulin  [x] If you are diabetic and your blood sugar is low or you feel symptomatic, you may drink 1-2 ounces of apple juice only or take a glucose tablet.            -The morning of your procedure, you may call the pre-op area if you have concerns about your blood sugar [phone]. [x] Follow physician instructions regarding any blood thinners you may be taking. WHAT TO EXPECT:    [x] The day of surgery you will be greeted and checked in by the [employer].  In addition, you will be registered in the [place] by a Patient Access Representative. Please bring your photo ID and insurance card. A nurse will greet you in accordance to the time you are needed in the pre-op area to prepare you for surgery. Please do not be discouraged if you are not greeted in the order you arrive as there are many variables that are involved in patient preparation. Your patience is greatly appreciated as you wait for your nurse. Please bring in items such as: books, magazines, newspapers, electronics, or any other items  to occupy your time in the waiting area. [x]  Delays may occur with surgery and staff will make a sincere effort to keep you informed of delays. If any delays occur with your procedure, we apologize ahead of time for your inconvenience as we recognize the value of your time.

## 2023-03-05 ENCOUNTER — PREP FOR PROCEDURE (OUTPATIENT)
Dept: UROLOGY | Age: 83
End: 2023-03-05

## 2023-03-05 RX ORDER — SODIUM CHLORIDE 0.9 % (FLUSH) 0.9 %
5-40 SYRINGE (ML) INJECTION EVERY 12 HOURS SCHEDULED
Status: CANCELLED | OUTPATIENT
Start: 2023-03-06

## 2023-03-05 RX ORDER — SODIUM CHLORIDE 9 MG/ML
INJECTION, SOLUTION INTRAVENOUS PRN
Status: CANCELLED | OUTPATIENT
Start: 2023-03-05

## 2023-03-05 RX ORDER — SODIUM CHLORIDE 0.9 % (FLUSH) 0.9 %
5-40 SYRINGE (ML) INJECTION PRN
Status: CANCELLED | OUTPATIENT
Start: 2023-03-05

## 2023-03-07 ENCOUNTER — ANESTHESIA EVENT (OUTPATIENT)
Dept: OPERATING ROOM | Age: 83
End: 2023-03-07
Payer: MEDICARE

## 2023-03-07 ENCOUNTER — HOSPITAL ENCOUNTER (OUTPATIENT)
Age: 83
Setting detail: OUTPATIENT SURGERY
Discharge: HOME | End: 2023-03-07
Attending: UROLOGY | Admitting: UROLOGY
Payer: MEDICARE

## 2023-03-07 ENCOUNTER — APPOINTMENT (OUTPATIENT)
Dept: GENERAL RADIOLOGY | Age: 83
End: 2023-03-07
Attending: UROLOGY
Payer: MEDICARE

## 2023-03-07 ENCOUNTER — ANESTHESIA (OUTPATIENT)
Dept: OPERATING ROOM | Age: 83
End: 2023-03-07
Payer: MEDICARE

## 2023-03-07 VITALS
OXYGEN SATURATION: 100 % | RESPIRATION RATE: 16 BRPM | BODY MASS INDEX: 23.74 KG/M2 | TEMPERATURE: 97.1 F | HEART RATE: 60 BPM | DIASTOLIC BLOOD PRESSURE: 84 MMHG | WEIGHT: 185 LBS | SYSTOLIC BLOOD PRESSURE: 184 MMHG | HEIGHT: 74 IN

## 2023-03-07 DIAGNOSIS — C61 PROSTATE CANCER (HCC): ICD-10-CM

## 2023-03-07 DIAGNOSIS — Z01.812 PRE-OPERATIVE LABORATORY EXAMINATION: Primary | ICD-10-CM

## 2023-03-07 LAB — METER GLUCOSE: 100 MG/DL (ref 74–99)

## 2023-03-07 PROCEDURE — 2709999900 HC NON-CHARGEABLE SUPPLY: Performed by: UROLOGY

## 2023-03-07 PROCEDURE — 88305 TISSUE EXAM BY PATHOLOGIST: CPT

## 2023-03-07 PROCEDURE — 7100000011 HC PHASE II RECOVERY - ADDTL 15 MIN: Performed by: UROLOGY

## 2023-03-07 PROCEDURE — 3700000001 HC ADD 15 MINUTES (ANESTHESIA): Performed by: UROLOGY

## 2023-03-07 PROCEDURE — 3600000003 HC SURGERY LEVEL 3 BASE: Performed by: UROLOGY

## 2023-03-07 PROCEDURE — 74420 UROGRAPHY RTRGR +-KUB: CPT

## 2023-03-07 PROCEDURE — 87088 URINE BACTERIA CULTURE: CPT

## 2023-03-07 PROCEDURE — 6360000002 HC RX W HCPCS

## 2023-03-07 PROCEDURE — 2580000003 HC RX 258

## 2023-03-07 PROCEDURE — 2720000010 HC SURG SUPPLY STERILE: Performed by: UROLOGY

## 2023-03-07 PROCEDURE — 88112 CYTOPATH CELL ENHANCE TECH: CPT

## 2023-03-07 PROCEDURE — 3600000013 HC SURGERY LEVEL 3 ADDTL 15MIN: Performed by: UROLOGY

## 2023-03-07 PROCEDURE — C1758 CATHETER, URETERAL: HCPCS | Performed by: UROLOGY

## 2023-03-07 PROCEDURE — 82962 GLUCOSE BLOOD TEST: CPT

## 2023-03-07 PROCEDURE — 7100000010 HC PHASE II RECOVERY - FIRST 15 MIN: Performed by: UROLOGY

## 2023-03-07 PROCEDURE — 6370000000 HC RX 637 (ALT 250 FOR IP): Performed by: ANESTHESIOLOGY

## 2023-03-07 PROCEDURE — 3700000000 HC ANESTHESIA ATTENDED CARE: Performed by: UROLOGY

## 2023-03-07 RX ORDER — MIDAZOLAM HYDROCHLORIDE 1 MG/ML
INJECTION INTRAMUSCULAR; INTRAVENOUS PRN
Status: DISCONTINUED | OUTPATIENT
Start: 2023-03-07 | End: 2023-03-07 | Stop reason: SDUPTHER

## 2023-03-07 RX ORDER — SODIUM CHLORIDE 0.9 % (FLUSH) 0.9 %
5-40 SYRINGE (ML) INJECTION PRN
Status: DISCONTINUED | OUTPATIENT
Start: 2023-03-07 | End: 2023-03-07 | Stop reason: HOSPADM

## 2023-03-07 RX ORDER — FENTANYL CITRATE 50 UG/ML
INJECTION, SOLUTION INTRAMUSCULAR; INTRAVENOUS PRN
Status: DISCONTINUED | OUTPATIENT
Start: 2023-03-07 | End: 2023-03-07 | Stop reason: SDUPTHER

## 2023-03-07 RX ORDER — SODIUM CHLORIDE 0.9 % (FLUSH) 0.9 %
5-40 SYRINGE (ML) INJECTION EVERY 12 HOURS SCHEDULED
Status: CANCELLED | OUTPATIENT
Start: 2023-03-07

## 2023-03-07 RX ORDER — OXYCODONE HYDROCHLORIDE AND ACETAMINOPHEN 5; 325 MG/1; MG/1
1 TABLET ORAL EVERY 4 HOURS PRN
Status: DISCONTINUED | OUTPATIENT
Start: 2023-03-07 | End: 2023-03-07 | Stop reason: HOSPADM

## 2023-03-07 RX ORDER — PROPOFOL 10 MG/ML
INJECTION, EMULSION INTRAVENOUS CONTINUOUS PRN
Status: DISCONTINUED | OUTPATIENT
Start: 2023-03-07 | End: 2023-03-07 | Stop reason: SDUPTHER

## 2023-03-07 RX ORDER — SODIUM CHLORIDE 0.9 % (FLUSH) 0.9 %
5-40 SYRINGE (ML) INJECTION EVERY 12 HOURS SCHEDULED
Status: DISCONTINUED | OUTPATIENT
Start: 2023-03-07 | End: 2023-03-07 | Stop reason: HOSPADM

## 2023-03-07 RX ORDER — SODIUM CHLORIDE, SODIUM LACTATE, POTASSIUM CHLORIDE, CALCIUM CHLORIDE 600; 310; 30; 20 MG/100ML; MG/100ML; MG/100ML; MG/100ML
INJECTION, SOLUTION INTRAVENOUS CONTINUOUS
Status: DISCONTINUED | OUTPATIENT
Start: 2023-03-07 | End: 2023-03-07 | Stop reason: HOSPADM

## 2023-03-07 RX ORDER — SODIUM CHLORIDE 9 MG/ML
INJECTION, SOLUTION INTRAVENOUS PRN
Status: CANCELLED | OUTPATIENT
Start: 2023-03-07

## 2023-03-07 RX ORDER — HYDRALAZINE HYDROCHLORIDE 20 MG/ML
10 INJECTION INTRAMUSCULAR; INTRAVENOUS
Status: CANCELLED | OUTPATIENT
Start: 2023-03-07

## 2023-03-07 RX ORDER — SODIUM CHLORIDE 9 MG/ML
INJECTION, SOLUTION INTRAVENOUS CONTINUOUS PRN
Status: DISCONTINUED | OUTPATIENT
Start: 2023-03-07 | End: 2023-03-07 | Stop reason: SDUPTHER

## 2023-03-07 RX ORDER — SODIUM CHLORIDE 0.9 % (FLUSH) 0.9 %
5-40 SYRINGE (ML) INJECTION PRN
Status: CANCELLED | OUTPATIENT
Start: 2023-03-07

## 2023-03-07 RX ORDER — LIDOCAINE HYDROCHLORIDE 20 MG/ML
INJECTION, SOLUTION INTRAVENOUS PRN
Status: DISCONTINUED | OUTPATIENT
Start: 2023-03-07 | End: 2023-03-07 | Stop reason: SDUPTHER

## 2023-03-07 RX ORDER — SODIUM CHLORIDE 9 MG/ML
INJECTION, SOLUTION INTRAVENOUS PRN
Status: DISCONTINUED | OUTPATIENT
Start: 2023-03-07 | End: 2023-03-07 | Stop reason: HOSPADM

## 2023-03-07 RX ORDER — LISINOPRIL 20 MG/1
40 TABLET ORAL ONCE
Status: COMPLETED | OUTPATIENT
Start: 2023-03-07 | End: 2023-03-07

## 2023-03-07 RX ADMIN — FENTANYL CITRATE 50 MCG: 50 INJECTION, SOLUTION INTRAMUSCULAR; INTRAVENOUS at 09:20

## 2023-03-07 RX ADMIN — MIDAZOLAM 1 MG: 1 INJECTION INTRAMUSCULAR; INTRAVENOUS at 09:00

## 2023-03-07 RX ADMIN — MIDAZOLAM 1 MG: 1 INJECTION INTRAMUSCULAR; INTRAVENOUS at 09:08

## 2023-03-07 RX ADMIN — PROPOFOL 50 MG: 10 INJECTION, EMULSION INTRAVENOUS at 09:10

## 2023-03-07 RX ADMIN — LIDOCAINE HYDROCHLORIDE 40 MG: 20 INJECTION, SOLUTION INTRAVENOUS at 09:10

## 2023-03-07 RX ADMIN — LISINOPRIL 40 MG: 20 TABLET ORAL at 11:33

## 2023-03-07 RX ADMIN — FENTANYL CITRATE 50 MCG: 50 INJECTION, SOLUTION INTRAMUSCULAR; INTRAVENOUS at 09:10

## 2023-03-07 RX ADMIN — FENTANYL CITRATE 50 MCG: 50 INJECTION, SOLUTION INTRAMUSCULAR; INTRAVENOUS at 09:51

## 2023-03-07 RX ADMIN — CEFAZOLIN 2000 MG: 2 INJECTION, POWDER, FOR SOLUTION INTRAMUSCULAR; INTRAVENOUS at 09:15

## 2023-03-07 RX ADMIN — PROPOFOL 125 MCG/KG/MIN: 10 INJECTION, EMULSION INTRAVENOUS at 09:11

## 2023-03-07 RX ADMIN — SODIUM CHLORIDE: 9 INJECTION, SOLUTION INTRAVENOUS at 08:50

## 2023-03-07 RX ADMIN — SODIUM CHLORIDE: 9 INJECTION, SOLUTION INTRAVENOUS at 07:46

## 2023-03-07 ASSESSMENT — LIFESTYLE VARIABLES: SMOKING_STATUS: 0

## 2023-03-07 ASSESSMENT — PAIN - FUNCTIONAL ASSESSMENT: PAIN_FUNCTIONAL_ASSESSMENT: 0-10

## 2023-03-07 ASSESSMENT — PAIN SCALES - GENERAL: PAINLEVEL_OUTOF10: 0

## 2023-03-07 NOTE — ANESTHESIA PRE PROCEDURE
Department of Anesthesiology  Preprocedure Note       Name:  Devang Miguel   Age:  78 y.o.  :  1940                                          MRN:  [id number]         Date:  3/7/2023      Surgeon: Sunil Gray):  Mary Lezama MD    Procedure: Procedure(s):  CYSTOSCOPY RETROGRADE PYELOGRAM  BLADDER BIOPSY FULGARATION    Medications prior to admission:   Prior to Admission medications    Medication Sig Start Date End Date Taking? Authorizing Provider   Multiple Vitamins-Minerals (PRESERVISION AREDS 2 PO) Take 1 tablet by mouth daily    Historical Provider, MD   amLODIPine (NORVASC) 5 MG tablet Take 1 tablet by mouth daily 22   Trino Cheney MD   ascorbic acid (V-R VITAMIN C) 250 MG tablet Take 1 tablet by mouth in the morning.  22  Marimar Castillo MD   lisinopril (PRINIVIL;ZESTRIL) 40 MG tablet Take 40 mg by mouth every morning    Historical Provider, MD   metFORMIN (GLUCOPHAGE) 500 MG tablet Take 500 mg by mouth every morning    Historical Provider, MD   Multiple Vitamins-Minerals (MENS 50+ MULTI VITAMIN/MIN) TABS Take 1 tablet by mouth every morning    Historical Provider, MD   tamsulosin (FLOMAX) 0.4 MG capsule Take 0.4 mg by mouth nightly    Historical Provider, MD   atorvastatin (LIPITOR) 40 MG tablet Take 40 mg by mouth nightly    Historical Provider, MD   metoprolol tartrate (LOPRESSOR) 25 MG tablet Take 25 mg by mouth daily    Historical Provider, MD   vitamin D (CHOLECALCIFEROL) 1000 UNIT TABS tablet Take 1,000 Units by mouth every morning    Historical Provider, MD       Current medications:    Current Facility-Administered Medications   Medication Dose Route Frequency Provider Last Rate Last Admin    sodium chloride flush 0.9 % injection 5-40 mL  5-40 mL IntraVENous 2 times per day Franciaeaodalys Rodriguezpbnurys, APRN - CNP        sodium chloride flush 0.9 % injection 5-40 mL  5-40 mL IntraVENous PRN Alondra Rodriguezpberry, APRN - CNP        0.9 % sodium chloride infusion   IntraVENous PRN Savanah Mckeon DESMOND Cruz CNP        ceFAZolin (ANCEF) 2,000 mg in sterile water 20 mL IV syringe  2,000 mg IntraVENous On Call to 939 DESMOND Medina CNP           Allergies:  No Known Allergies    Problem List:    Patient Active Problem List   Diagnosis Code    Hypertension I10    ED (erectile dysfunction) N52.9    Type 2 diabetes mellitus without complication ([facility] [address] P90.2    Hx of colonic polyps Z86.010    Chronic gout M1A. 9XX0    MGUS (monoclonal gammopathy of unknown significance) D47.2    Near syncope R55    Aortic stenosis, mild I35.0    Mild dilation of ascending aorta (HCC) I77.810    Cardiac pacemaker in situ Z95.0    Aortocoronary bypass status Z95.1    H/O mitral valve repair Z98.890    H/O aortic valve replacement Z95.2    Pure hypercholesterolemia E78.00    Rectal bleed K62.5    GI bleed K92.2    Acute respiratory failure due to COVID-19 (HCC) U07.1, J96.00    Generalized weakness R53.1       Past Medical History:        Diagnosis Date    Diabetes mellitus ([facility] [address]     states glucose is running normal    Erectile dysfunction     Gout     Hypertension     states doing well    Vitamin D deficiency        Past Surgical History:        Procedure Laterality Date    APPENDECTOMY  01/01/1967   [facility] CARDIAC SURGERY  06/08/2020    CATARACT REMOVAL WITH IMPLANT Left 06/06/2013    CATARACT REMOVAL WITH IMPLANT Right 10/31/2013    HERNIA REPAIR  01/01/1970    [facility]    PACEMAKER PLACEMENT      TONSILLECTOMY         Social History:    Social History     Tobacco Use    Smoking status: Former     Years: 15.00     Types: Cigarettes    Smokeless tobacco: Former     Quit date: 10/28/1970   Substance Use Topics    Alcohol use:  Yes     Alcohol/week: 14.0 standard drinks     Types: 14 Shots of liquor per week                                Counseling given: Not Answered      Vital Signs (Current):   Vitals:    03/03/23 1457   Weight: 185 lb (83.9 kg)   Height: 6' 2\" (1.88 m) BP Readings from Last 3 Encounters:   11/21/22 (!) 140/82   07/30/22 (!) 148/71   10/12/20 132/70       NPO Status:  > 8 hours                                                                               BMI:   Wt Readings from Last 3 Encounters:   03/03/23 185 lb (83.9 kg)   11/17/22 185 lb 7 oz (84.1 kg)   07/30/22 218 lb 6.4 oz (99.1 kg)     Body mass index is 23.75 kg/m². CBC:   Lab Results   Component Value Date/Time    WBC 4.1 11/21/2022 04:53 AM    RBC 3.01 11/21/2022 04:53 AM    HGB 10.1 11/21/2022 04:53 AM    HCT 29.6 11/21/2022 04:53 AM    MCV 98.3 11/21/2022 04:53 AM    RDW 13.9 11/21/2022 04:53 AM     11/21/2022 04:53 AM       CMP:   Lab Results   Component Value Date/Time     11/21/2022 04:53 AM    K 4.6 11/21/2022 04:53 AM    K 4.4 07/30/2022 03:31 AM     11/21/2022 04:53 AM    CO2 24 11/21/2022 04:53 AM    BUN 22 11/21/2022 04:53 AM    CREATININE 0.9 11/21/2022 04:53 AM    GFRAA >60 07/30/2022 03:31 AM    LABGLOM >60 11/21/2022 04:53 AM    GLUCOSE 84 11/21/2022 04:53 AM    PROT 6.5 11/21/2022 04:53 AM    CALCIUM 8.9 11/21/2022 04:53 AM    BILITOT 0.8 11/21/2022 04:53 AM    ALKPHOS 120 11/21/2022 04:53 AM    AST 37 11/21/2022 04:53 AM    ALT 47 11/21/2022 04:53 AM       POC Tests: No results for input(s): POCGLU, POCNA, POCK, POCCL, POCBUN, POCHEMO, POCHCT in the last 72 hours. Coags:   Lab Results   Component Value Date/Time    PROTIME 14.5 07/28/2022 09:45 AM    INR 1.3 07/28/2022 09:45 AM    APTT 27.4 05/05/2019 02:23 PM       HCG (If Applicable): No results found for: PREGTESTUR, PREGSERUM, HCG, HCGQUANT     ABGs: No results found for: PHART, PO2ART, BGN8HTP, CVL1QLG, BEART, Y9TFEKYD     Type & Screen (If Applicable):  No results found for: LABABO, LABRH    Drug/Infectious Status (If Applicable):  No results found for: HIV, HEPCAB     Transthoracic Echo 5/7/2019  Summary   Left ventricle grossly normal in size.    Mild left ventricular concentric hypertrophy noted.   <50% criteria for diastolic dysfunction. Estimated left ventricular ejection fraction is 64±5%. Moderately dilated aortic root. Normal right ventricular size and function. The LAESV Index is <34ml/m2. Physiologic and/or trace mitral regurgitation is present. Mild-to-moderate aortic stenosis. Physiologic and/or trace tricuspid regurgitation. Technically fair quality study. Technically difficult study due to patient''s body habitus. No comparison study available. Suggest clinical correlation. COVID-19 Screening (If Applicable):   Lab Results   Component Value Date/Time    COVID19 NOT DETECTED 11/17/2022 03:04 PM           Anesthesia Evaluation  Patient summary reviewed and Nursing notes reviewed no history of anesthetic complications:   Airway: Mallampati: II  TM distance: >3 FB     Mouth opening: > = 3 FB   Dental:      Comment: Caps/implants    Pulmonary:Negative Pulmonary ROS breath sounds clear to auscultation      (-) not a current smoker (Former)                           Cardiovascular:  Exercise tolerance: good (>4 METS),   (+) hypertension:, valvular problems/murmurs (Mild to moderate AS per echo 2019- prior to surgical intervention):, pacemaker (Interrogated 2/19/2023): pacemaker and no interval change, CABG/stent (June 2020): no interval change, dysrhythmias: atrial fibrillation and paced rhythm,         Rhythm: irregular  Rate: normal                 ROS comment: Hx AV replacement, MV repair     Neuro/Psych:               GI/Hepatic/Renal:            ROS comment: Hx of GIB while on AC. Endo/Other:    (+) DiabetesType II DM, , : arthritis: OA., .                  ROS comment: EtOH use Abdominal:             Vascular: Other Findings:           Anesthesia Plan      MAC     ASA 3       Induction: intravenous. Anesthetic plan and risks discussed with patient. Plan discussed with attending.     Attending anesthesiologist reviewed and agrees with Preprocedure content                Venessa Handler, APRN - CRNA   3/7/2023      DOS STAFF ADDENDUM:    Pt seen and examined, physical exam updated, chart reviewed including anesthesia, drug and allergy history. H&P reviewed. No interval changes to history or physical examination (unless noted above). NPO status confirmed. Anesthetic plan, risks, benefits, alternatives discussed with patient. Patient verbalized an understanding and agrees to proceed.      Lazaro Robbins MD   Anesthesiologist

## 2023-03-07 NOTE — PROGRESS NOTES
Dr Airam Jeronimo aware of blood pressure of 183/90 and heart rate of 60, ok to discharge home, no further orders.

## 2023-03-07 NOTE — DISCHARGE INSTRUCTIONS
May see blood in urine  Increase oral fluids next 2-3days  Resume diet  No driving 24 hrs  Office Friday at 8am  May have burning  Ok to shower

## 2023-03-07 NOTE — OP NOTE
[address]                  [address] [facility], [address] [place] [address]                                OPERATIVE REPORT    PATIENT NAME: Ada Ledezma                    :        1940  MED REC NO:   [id number]                            ROOM:  ACCOUNT NO:   [de-identified]                           ADMIT DATE: 2023  PROVIDER:     Michelle Gutierrez MD    DATE OF PROCEDURE:  2023    PREOPERATIVE DIAGNOSIS:  Hematuria with positive urine cytology. OPERATIONS:  Cystopanendoscopy, retrograde pyelogram, transurethral  resection of bladder lesion with fulguration. SURGEON:  Michelle Gutierrez MD    ANESTHESIA:  LMAC. ESTIMATED BLOOD LOSS:  Less than 50 mL. DESCRIPTION OF THE PROCEDURE:  With the patient in the lithotomy  position under satisfactory sedation, the genitalia were prepped and  draped in a sterile manner. A 21-Korean panendoscope passed easily  through the pendulous and membranous urethra. There were no strictures  or lesions seen. The prostatic fossa showed lateral and some middle  lobe enlargement with some encroachment of the bladder outlet. Upon  entering the bladder, urine was obtained for cytology. Inspection  revealed the trigone to be symmetrical.  The ureteral orifices of normal  configuration and location. At approximately the 8 o'clock location on  the bladder neck, there was a lesion that appeared to have a papillary  appearance. There also appeared to be surrounding whitish raised small  areas almost likely comedo effect scattered around the lesion. The  remainder of the bladder was relatively unremarkable; however, at the  trigone area, there appeared to be some mucosa hyperplasia, and this was  associated with some enlargement of the median lobe.   Retrograde  pyelograms were performed demonstrating normal upper tracts with  satisfactory drainage, following which the urethra was sounded to allow  placement of a 28-Bengali resectoscope sheath. Resection of the lesion  at the 8 o'clock location of the bladder was carried out with sterile  Fagan resectoscope. Fulguration achieved hemostasis. The base of  the bladder in front of the trigone was also resected as was some of the  enlarged median lobe. Fulguration achieved good hemostasis. All clots  and chips were evacuated, and 22, 30-mL two-way Parisi catheter was  inserted, placed on continuous drainage. The patient tolerated the  procedure well, was sent to recovery room in satisfactory condition.         Ana Kruse MD    D: 03/07/2023 10:29:43       T: 03/07/2023 10:32:32     [id number]  Job#: [id number]     Doc#: [id number]    CC:

## 2023-03-07 NOTE — BRIEF OP NOTE
Brief Postoperative Note      Patient: Praveena Rutherford  YOB: 1940  MRN: [id number]    Date of Procedure: 3/7/2023    Pre-Op Diagnosis: hematuria positive cytology    Post-Op Diagnosis: Same       Procedure(s):  CYSTOSCOPY RETROGRADE PYELOGRAM  BLADDER BIOPSY FULGARATION    Surgeon(s):  Nedra Shirley MD    Assistant:      Anesthesia: Monitor Anesthesia Care    Estimated Blood Loss (mL): less than 50     Complications: None    Specimens:   ID Type Source Tests Collected by Time Destination   1 : cystoscopy urine culture Urine Urine, Cystoscopic CULTURE, URINE Nedra Shirley MD 3/7/2023 4755    A : cystoscopy urine cytology-bladder Urine Urine, Cystoscopic CYTOLOGY, NON-GYN Nedra Shirley MD 3/7/2023 1007    B : bladder neck tumor Tissue Tissue SURGICAL PATHOLOGY [facility] Nurse MD Becky 3/7/2023 0949        Implants:        Drains:   Urinary Catheter 03/07/23 2 Way (Active)   $ Urethral catheter insertion Inserted for procedure 03/07/23 1010   Urine Color Bloody 03/07/23 1010   Urine Appearance Clear 03/07/23 1010       Findings:     Electronically signed by Nedra Shirley MD on 3/7/2023 at 10:14 AM

## 2023-03-10 LAB
URINE CULTURE, ROUTINE: NORMAL
URINE CULTURE, ROUTINE: NORMAL

## 2023-03-20 DIAGNOSIS — C67.9 MALIGNANT NEOPLASM OF URINARY BLADDER, UNSPECIFIED SITE (HCC): ICD-10-CM

## 2023-03-20 RX ORDER — LIDOCAINE HYDROCHLORIDE 20 MG/ML
JELLY TOPICAL ONCE
OUTPATIENT
Start: 2023-03-20 | End: 2023-03-20

## 2023-03-20 RX ORDER — LIDOCAINE HYDROCHLORIDE 20 MG/ML
JELLY TOPICAL ONCE
OUTPATIENT
Start: 2023-04-24 | End: 2023-04-24

## 2023-03-20 RX ORDER — LIDOCAINE HYDROCHLORIDE 20 MG/ML
JELLY TOPICAL ONCE
OUTPATIENT
Start: 2023-04-10 | End: 2023-04-10

## 2023-03-20 RX ORDER — LIDOCAINE HYDROCHLORIDE 20 MG/ML
JELLY TOPICAL ONCE
OUTPATIENT
Start: 2023-04-17 | End: 2023-04-17

## 2023-03-20 RX ORDER — LIDOCAINE HYDROCHLORIDE 20 MG/ML
JELLY TOPICAL ONCE
OUTPATIENT
Start: 2023-04-03 | End: 2023-04-03

## 2023-03-20 RX ORDER — LIDOCAINE HYDROCHLORIDE 20 MG/ML
JELLY TOPICAL ONCE
OUTPATIENT
Start: 2023-03-27 | End: 2023-03-27

## 2023-04-03 ENCOUNTER — HOSPITAL ENCOUNTER (OUTPATIENT)
Dept: INFUSION THERAPY | Age: 83
Setting detail: INFUSION SERIES
Discharge: HOME | End: 2023-04-03
Payer: MEDICARE

## 2023-04-03 VITALS
TEMPERATURE: 97.2 F | DIASTOLIC BLOOD PRESSURE: 77 MMHG | HEART RATE: 79 BPM | SYSTOLIC BLOOD PRESSURE: 159 MMHG | OXYGEN SATURATION: 100 % | RESPIRATION RATE: 16 BRPM

## 2023-04-03 DIAGNOSIS — C67.9 MALIGNANT NEOPLASM OF URINARY BLADDER, UNSPECIFIED SITE (HCC): Primary | ICD-10-CM

## 2023-04-03 PROCEDURE — 6370000000 HC RX 637 (ALT 250 FOR IP): Performed by: UROLOGY

## 2023-04-03 PROCEDURE — 2580000003 HC RX 258: Performed by: UROLOGY

## 2023-04-03 PROCEDURE — 6360000002 HC RX W HCPCS: Performed by: UROLOGY

## 2023-04-03 PROCEDURE — 51720 TREATMENT OF BLADDER LESION: CPT

## 2023-04-03 RX ORDER — FUROSEMIDE 40 MG/1
20 TABLET ORAL DAILY
COMMUNITY
Start: 2022-08-06

## 2023-04-03 RX ORDER — LIDOCAINE HYDROCHLORIDE 20 MG/ML
JELLY TOPICAL ONCE
Status: COMPLETED | OUTPATIENT
Start: 2023-04-03 | End: 2023-04-03

## 2023-04-03 RX ADMIN — LIDOCAINE HYDROCHLORIDE: 20 JELLY TOPICAL at 09:00

## 2023-04-03 RX ADMIN — SODIUM CHLORIDE 50 MG: 9 INJECTION, SOLUTION INTRAVENOUS at 09:01

## 2023-04-03 NOTE — PROGRESS NOTES
Patient at infusion for 1st bcg bladder treatment. He denies any current fever/chills, burning, bleeding, frequency, or urgency on urination. Patient prefers 2% lidocaine jelly used directly before catheterization. 2% lidocaine jelly inserted into patient urethra. A 14 Indonesian catheter then inserted into patient bladder and 60cc clear yellow urine drained. The bcg then instilled and catheter removed at 0911. Patient tolerated well. Patient remained on unit with wife at bedside. He understands to rotate positions every 15 minutes, and to hold medication for 2 hours until 1111. Patient held most of the medication for full 2 hours but did have 2 episodes of leakage during treatment. One was about 1 hour and 20 minutes in, one was right at the end of the 2 hours. Patient was cleaned up well both times, and urinated in bathroom prior to d/c. Encouraged wife to call urology office to get oxybutynin ordered for next treatment, and also explained that his catheter should be left in for next treatment since he has episodes of incontinence. They are both agreeable. Patient and wife educated on bcg, toileting, gaviota care, intimacy, laundry care at home. They also understand what side effects to be watching for and what to do if side effects occur. They were provided education handout on medication as well as d/c instructions. All questions answered. D/C in stable condition with his wife.

## 2023-04-10 ENCOUNTER — HOSPITAL ENCOUNTER (OUTPATIENT)
Dept: INFUSION THERAPY | Age: 83
Setting detail: INFUSION SERIES
Discharge: HOME | End: 2023-04-10
Payer: MEDICARE

## 2023-04-10 VITALS
HEART RATE: 75 BPM | SYSTOLIC BLOOD PRESSURE: 155 MMHG | OXYGEN SATURATION: 100 % | TEMPERATURE: 97.1 F | DIASTOLIC BLOOD PRESSURE: 80 MMHG | RESPIRATION RATE: 12 BRPM

## 2023-04-10 DIAGNOSIS — C67.9 MALIGNANT NEOPLASM OF URINARY BLADDER, UNSPECIFIED SITE (HCC): Primary | ICD-10-CM

## 2023-04-10 PROCEDURE — 6370000000 HC RX 637 (ALT 250 FOR IP): Performed by: UROLOGY

## 2023-04-10 PROCEDURE — 51720 TREATMENT OF BLADDER LESION: CPT

## 2023-04-10 PROCEDURE — 2580000003 HC RX 258: Performed by: UROLOGY

## 2023-04-10 PROCEDURE — 6360000002 HC RX W HCPCS: Performed by: UROLOGY

## 2023-04-10 RX ORDER — OXYBUTYNIN CHLORIDE 10 MG/1
10 TABLET, EXTENDED RELEASE ORAL DAILY PRN
Status: ON HOLD | COMMUNITY
Start: 2023-04-03 | End: 2023-04-20 | Stop reason: HOSPADM

## 2023-04-10 RX ORDER — LIDOCAINE HYDROCHLORIDE 20 MG/ML
JELLY TOPICAL ONCE
Status: COMPLETED | OUTPATIENT
Start: 2023-04-10 | End: 2023-04-10

## 2023-04-10 RX ADMIN — SODIUM CHLORIDE 50 MG: 9 INJECTION, SOLUTION INTRAVENOUS at 08:29

## 2023-04-10 RX ADMIN — LIDOCAINE HYDROCHLORIDE: 20 JELLY TOPICAL at 08:28

## 2023-04-10 NOTE — PROGRESS NOTES
Spoke to Donovan GARVIN at Dr. Zhao Briseno office. Informed her of patient taking 10mg of oxybuytinin and also leaving catheter in and clamped for bcg treatment, and patient still leaked around tip of catheter during entire treatment. She will send message to doctors nurse and they will call us back and let us know if any changes.

## 2023-04-10 NOTE — PROGRESS NOTES
Patient at [facility] center for 2nd bcg bladder treatment. He denies any current fever/chills, burning, bleeding, frequency on urination. He does normally have urgency which is not new for him. He got oxybuytinin ordered and did take today prior to treatment. Patient prefers 2% lidocaine jelly to be used directly before catheterization. 2% lidocaine jelly inserted into patient urethra. A 16 [place] catheter then inserted into patient bladder and 100cc yellow urine with sediment drained. The bcg then instilled and catheter clamped at 0850. Patient did have some leakage around tip of catheter right as medication fully instilled, but it did subside after a  few seconds. Patient was cleaned up well and pad changed. Patient tolerated well. Patient remained on unit with wife at bedside. They understand to turn every 15 minutes. Patient did have some occurrences of leakages throughout his treatment on the pad surrounding his catheter. He also had a large bowel movement in which he was cleaned up after, and also had visible leaking of urine with some blood around tip of catheter. Patient cleaned up well throughout treatment. At the end of 2 hour todd at 1050 patients catheter clamp was released and an additional 300cc galileo/tea colored urine with sediment drained from catheter. The catheter then removed. Patient did have some minimal bleeding around tip of catheter. He was cleaned well after treatment. Patient and wife understand toileting and gaviota care at home, as well as what side effects to be watching for and what to do if side effects occur. They decline printed d/c instructions or education handout on medication as they still have from last week. All questions answered. D/C in stable condition with his wife. Informed them I would call urology office to let physician know about leaking throughout treatment today. They state understanding.

## 2023-04-12 ENCOUNTER — APPOINTMENT (OUTPATIENT)
Dept: CT IMAGING | Age: 83
End: 2023-04-12
Payer: MEDICARE

## 2023-04-12 ENCOUNTER — APPOINTMENT (OUTPATIENT)
Dept: GENERAL RADIOLOGY | Age: 83
End: 2023-04-12
Payer: MEDICARE

## 2023-04-12 ENCOUNTER — HOSPITAL ENCOUNTER (INPATIENT)
Age: 83
LOS: 8 days | Discharge: NURSING, REHAB OR LONG-TERM CARE | End: 2023-04-20
Attending: STUDENT IN AN ORGANIZED HEALTH CARE EDUCATION/TRAINING PROGRAM | Admitting: UROLOGY
Payer: MEDICARE

## 2023-04-12 DIAGNOSIS — C67.9 MALIGNANT NEOPLASM OF URINARY BLADDER, UNSPECIFIED SITE (HCC): ICD-10-CM

## 2023-04-12 DIAGNOSIS — N39.0 URINARY TRACT INFECTION WITH HEMATURIA, SITE UNSPECIFIED: ICD-10-CM

## 2023-04-12 DIAGNOSIS — R31.9 URINARY TRACT INFECTION WITH HEMATURIA, SITE UNSPECIFIED: ICD-10-CM

## 2023-04-12 DIAGNOSIS — R53.1 GENERAL WEAKNESS: Primary | ICD-10-CM

## 2023-04-12 DIAGNOSIS — R50.9 FEVER, UNSPECIFIED FEVER CAUSE: ICD-10-CM

## 2023-04-12 LAB
ALBUMIN SERPL-MCNC: 3.7 G/DL (ref 3.5–5.2)
ALP SERPL-CCNC: 146 U/L (ref 40–129)
ALT SERPL-CCNC: 17 U/L (ref 0–40)
AMORPH SED URNS QL MICRO: ABNORMAL
ANION GAP SERPL CALCULATED.3IONS-SCNC: 12 MMOL/L (ref 7–16)
ANISOCYTOSIS: ABNORMAL
AST SERPL-CCNC: 21 U/L (ref 0–39)
BACTERIA URNS QL MICRO: ABNORMAL /HPF
BASOPHILS # BLD: 0 E9/L (ref 0–0.2)
BASOPHILS NFR BLD: 0 % (ref 0–2)
BILIRUB SERPL-MCNC: 0.8 MG/DL (ref 0–1.2)
BILIRUB UR QL STRIP: NEGATIVE
BNP BLD-MCNC: 7930 PG/ML (ref 0–450)
BUN SERPL-MCNC: 44 MG/DL (ref 6–23)
CALCIUM SERPL-MCNC: 8.8 MG/DL (ref 8.6–10.2)
CHLORIDE SERPL-SCNC: 101 MMOL/L (ref 98–107)
CLARITY UR: ABNORMAL
CO2 SERPL-SCNC: 20 MMOL/L (ref 22–29)
COLOR UR: YELLOW
CREAT SERPL-MCNC: 1.6 MG/DL (ref 0.7–1.2)
EOSINOPHIL # BLD: 0 E9/L (ref 0.05–0.5)
EOSINOPHIL NFR BLD: 0 % (ref 0–6)
ERYTHROCYTE [DISTWIDTH] IN BLOOD BY AUTOMATED COUNT: 14.6 FL (ref 11.5–15)
GLUCOSE SERPL-MCNC: 140 MG/DL (ref 74–99)
GLUCOSE UR STRIP-MCNC: NEGATIVE MG/DL
HCT VFR BLD AUTO: 31.3 % (ref 37–54)
HGB BLD-MCNC: 10.2 G/DL (ref 12.5–16.5)
HGB UR QL STRIP: ABNORMAL
KETONES UR STRIP-MCNC: NEGATIVE MG/DL
LACTATE BLDV-SCNC: 1.5 MMOL/L (ref 0.5–1.9)
LACTATE BLDV-SCNC: 1.6 MMOL/L (ref 0.5–1.9)
LEUKOCYTE ESTERASE UR QL STRIP: ABNORMAL
LYMPHOCYTES # BLD: 0.09 E9/L (ref 1.5–4)
LYMPHOCYTES NFR BLD: 0.9 % (ref 20–42)
MAGNESIUM SERPL-MCNC: 2 MG/DL (ref 1.6–2.6)
MCH RBC QN AUTO: 31.9 PG (ref 26–35)
MCHC RBC AUTO-ENTMCNC: 32.6 % (ref 32–34.5)
MCV RBC AUTO: 97.8 FL (ref 80–99.9)
METER GLUCOSE: 138 MG/DL (ref 74–99)
MONOCYTES # BLD: 0.65 E9/L (ref 0.1–0.95)
MONOCYTES NFR BLD: 6.9 % (ref 2–12)
NEUTROPHILS # BLD: 8.56 E9/L (ref 1.8–7.3)
NEUTS SEG NFR BLD: 92.2 % (ref 43–80)
NITRITE UR QL STRIP: POSITIVE
NRBC BLD-RTO: 0 /100 WBC
PH UR STRIP: 5 [PH] (ref 5–9)
PLATELET # BLD AUTO: 89 E9/L (ref 130–450)
PLATELET CONFIRMATION: NORMAL
PMV BLD AUTO: 11.5 FL (ref 7–12)
POIKILOCYTES: ABNORMAL
POTASSIUM SERPL-SCNC: 4.4 MMOL/L (ref 3.5–5)
PROT SERPL-MCNC: 7 G/DL (ref 6.4–8.3)
PROT UR STRIP-MCNC: 100 MG/DL
RBC # BLD AUTO: 3.2 E12/L (ref 3.8–5.8)
RBC #/AREA URNS HPF: >20 /HPF (ref 0–2)
SARS-COV-2 RDRP RESP QL NAA+PROBE: NOT DETECTED
SODIUM SERPL-SCNC: 133 MMOL/L (ref 132–146)
SP GR UR STRIP: 1.02 (ref 1–1.03)
TROPONIN, HIGH SENSITIVITY: 51 NG/L (ref 0–11)
TROPONIN, HIGH SENSITIVITY: 54 NG/L (ref 0–11)
UROBILINOGEN UR STRIP-ACNC: 0.2 E.U./DL
WBC # BLD: 9.3 E9/L (ref 4.5–11.5)
WBC #/AREA URNS HPF: >20 /HPF (ref 0–5)

## 2023-04-12 PROCEDURE — 83880 ASSAY OF NATRIURETIC PEPTIDE: CPT

## 2023-04-12 PROCEDURE — 96374 THER/PROPH/DIAG INJ IV PUSH: CPT

## 2023-04-12 PROCEDURE — 70450 CT HEAD/BRAIN W/O DYE: CPT

## 2023-04-12 PROCEDURE — 99285 EMERGENCY DEPT VISIT HI MDM: CPT

## 2023-04-12 PROCEDURE — 6370000000 HC RX 637 (ALT 250 FOR IP): Performed by: STUDENT IN AN ORGANIZED HEALTH CARE EDUCATION/TRAINING PROGRAM

## 2023-04-12 PROCEDURE — 85025 COMPLETE CBC W/AUTO DIFF WBC: CPT

## 2023-04-12 PROCEDURE — 2580000003 HC RX 258: Performed by: UROLOGY

## 2023-04-12 PROCEDURE — 83735 ASSAY OF MAGNESIUM: CPT

## 2023-04-12 PROCEDURE — 82962 GLUCOSE BLOOD TEST: CPT

## 2023-04-12 PROCEDURE — 87150 DNA/RNA AMPLIFIED PROBE: CPT

## 2023-04-12 PROCEDURE — 87088 URINE BACTERIA CULTURE: CPT

## 2023-04-12 PROCEDURE — 83605 ASSAY OF LACTIC ACID: CPT

## 2023-04-12 PROCEDURE — 84484 ASSAY OF TROPONIN QUANT: CPT

## 2023-04-12 PROCEDURE — 72125 CT NECK SPINE W/O DYE: CPT

## 2023-04-12 PROCEDURE — 93005 ELECTROCARDIOGRAM TRACING: CPT | Performed by: STUDENT IN AN ORGANIZED HEALTH CARE EDUCATION/TRAINING PROGRAM

## 2023-04-12 PROCEDURE — 74176 CT ABD & PELVIS W/O CONTRAST: CPT

## 2023-04-12 PROCEDURE — 81001 URINALYSIS AUTO W/SCOPE: CPT

## 2023-04-12 PROCEDURE — 87186 SC STD MICRODIL/AGAR DIL: CPT

## 2023-04-12 PROCEDURE — 80053 COMPREHEN METABOLIC PANEL: CPT

## 2023-04-12 PROCEDURE — 6360000002 HC RX W HCPCS: Performed by: UROLOGY

## 2023-04-12 PROCEDURE — 2060000000 HC ICU INTERMEDIATE R&B

## 2023-04-12 PROCEDURE — 87635 SARS-COV-2 COVID-19 AMP PRB: CPT

## 2023-04-12 PROCEDURE — 87040 BLOOD CULTURE FOR BACTERIA: CPT

## 2023-04-12 PROCEDURE — 71045 X-RAY EXAM CHEST 1 VIEW: CPT

## 2023-04-12 RX ORDER — ONDANSETRON 4 MG/1
4 TABLET, ORALLY DISINTEGRATING ORAL EVERY 8 HOURS PRN
Status: DISCONTINUED | OUTPATIENT
Start: 2023-04-12 | End: 2023-04-20 | Stop reason: HOSPADM

## 2023-04-12 RX ORDER — ACETAMINOPHEN 325 MG/1
650 TABLET ORAL EVERY 6 HOURS PRN
Status: DISCONTINUED | OUTPATIENT
Start: 2023-04-12 | End: 2023-04-20 | Stop reason: HOSPADM

## 2023-04-12 RX ORDER — LISINOPRIL 20 MG/1
40 TABLET ORAL EVERY MORNING
Status: DISCONTINUED | OUTPATIENT
Start: 2023-04-13 | End: 2023-04-17

## 2023-04-12 RX ORDER — ACETAMINOPHEN 500 MG
1000 TABLET ORAL ONCE
Status: COMPLETED | OUTPATIENT
Start: 2023-04-12 | End: 2023-04-12

## 2023-04-12 RX ORDER — SODIUM CHLORIDE 0.9 % (FLUSH) 0.9 %
10 SYRINGE (ML) INJECTION PRN
Status: DISCONTINUED | OUTPATIENT
Start: 2023-04-12 | End: 2023-04-20 | Stop reason: HOSPADM

## 2023-04-12 RX ORDER — HYDRALAZINE HYDROCHLORIDE 20 MG/ML
10 INJECTION INTRAMUSCULAR; INTRAVENOUS EVERY 4 HOURS PRN
Status: DISCONTINUED | OUTPATIENT
Start: 2023-04-12 | End: 2023-04-20 | Stop reason: HOSPADM

## 2023-04-12 RX ORDER — SODIUM CHLORIDE 0.9 % (FLUSH) 0.9 %
10 SYRINGE (ML) INJECTION EVERY 12 HOURS SCHEDULED
Status: DISCONTINUED | OUTPATIENT
Start: 2023-04-12 | End: 2023-04-20 | Stop reason: HOSPADM

## 2023-04-12 RX ORDER — VITAMIN B COMPLEX
1000 TABLET ORAL EVERY MORNING
Status: DISCONTINUED | OUTPATIENT
Start: 2023-04-13 | End: 2023-04-20 | Stop reason: HOSPADM

## 2023-04-12 RX ORDER — FUROSEMIDE 20 MG/1
20 TABLET ORAL DAILY
Status: DISCONTINUED | OUTPATIENT
Start: 2023-04-13 | End: 2023-04-20 | Stop reason: HOSPADM

## 2023-04-12 RX ORDER — ENOXAPARIN SODIUM 100 MG/ML
40 INJECTION SUBCUTANEOUS DAILY
Status: DISCONTINUED | OUTPATIENT
Start: 2023-04-13 | End: 2023-04-20 | Stop reason: HOSPADM

## 2023-04-12 RX ORDER — DEXTROSE MONOHYDRATE 100 MG/ML
INJECTION, SOLUTION INTRAVENOUS CONTINUOUS PRN
Status: DISCONTINUED | OUTPATIENT
Start: 2023-04-12 | End: 2023-04-20 | Stop reason: HOSPADM

## 2023-04-12 RX ORDER — INSULIN LISPRO 100 [IU]/ML
0-4 INJECTION, SOLUTION INTRAVENOUS; SUBCUTANEOUS NIGHTLY
Status: DISCONTINUED | OUTPATIENT
Start: 2023-04-12 | End: 2023-04-20 | Stop reason: HOSPADM

## 2023-04-12 RX ORDER — ATORVASTATIN CALCIUM 40 MG/1
40 TABLET, FILM COATED ORAL NIGHTLY
Status: DISCONTINUED | OUTPATIENT
Start: 2023-04-12 | End: 2023-04-20 | Stop reason: HOSPADM

## 2023-04-12 RX ORDER — SENNA PLUS 8.6 MG/1
1 TABLET ORAL DAILY PRN
Status: DISCONTINUED | OUTPATIENT
Start: 2023-04-12 | End: 2023-04-20 | Stop reason: HOSPADM

## 2023-04-12 RX ORDER — INSULIN LISPRO 100 [IU]/ML
0-8 INJECTION, SOLUTION INTRAVENOUS; SUBCUTANEOUS
Status: DISCONTINUED | OUTPATIENT
Start: 2023-04-13 | End: 2023-04-20 | Stop reason: HOSPADM

## 2023-04-12 RX ORDER — ACETAMINOPHEN 650 MG/1
650 SUPPOSITORY RECTAL EVERY 6 HOURS PRN
Status: DISCONTINUED | OUTPATIENT
Start: 2023-04-12 | End: 2023-04-20 | Stop reason: HOSPADM

## 2023-04-12 RX ORDER — SODIUM CHLORIDE 9 MG/ML
INJECTION, SOLUTION INTRAVENOUS PRN
Status: DISCONTINUED | OUTPATIENT
Start: 2023-04-12 | End: 2023-04-20 | Stop reason: HOSPADM

## 2023-04-12 RX ORDER — ONDANSETRON 2 MG/ML
4 INJECTION INTRAMUSCULAR; INTRAVENOUS EVERY 6 HOURS PRN
Status: DISCONTINUED | OUTPATIENT
Start: 2023-04-12 | End: 2023-04-20 | Stop reason: HOSPADM

## 2023-04-12 RX ORDER — SODIUM CHLORIDE, SODIUM LACTATE, POTASSIUM CHLORIDE, CALCIUM CHLORIDE 600; 310; 30; 20 MG/100ML; MG/100ML; MG/100ML; MG/100ML
INJECTION, SOLUTION INTRAVENOUS ONCE
Status: COMPLETED | OUTPATIENT
Start: 2023-04-12 | End: 2023-04-13

## 2023-04-12 RX ORDER — LANOLIN ALCOHOL/MO/W.PET/CERES
3 CREAM (GRAM) TOPICAL NIGHTLY PRN
Status: DISCONTINUED | OUTPATIENT
Start: 2023-04-13 | End: 2023-04-20 | Stop reason: HOSPADM

## 2023-04-12 RX ADMIN — WATER 1000 MG: 1 INJECTION INTRAMUSCULAR; INTRAVENOUS; SUBCUTANEOUS at 20:44

## 2023-04-12 RX ADMIN — ACETAMINOPHEN 500 MG: 500 TABLET ORAL at 17:10

## 2023-04-12 RX ADMIN — ACETAMINOPHEN 1000 MG: 500 TABLET ORAL at 21:18

## 2023-04-12 ASSESSMENT — ENCOUNTER SYMPTOMS
SHORTNESS OF BREATH: 0
COUGH: 0
BACK PAIN: 0
SORE THROAT: 0
DIARRHEA: 0
VOMITING: 0
RHINORRHEA: 0
ABDOMINAL PAIN: 0
NAUSEA: 0

## 2023-04-12 ASSESSMENT — PAIN SCALES - GENERAL: PAINLEVEL_OUTOF10: 0

## 2023-04-13 ENCOUNTER — APPOINTMENT (OUTPATIENT)
Dept: ULTRASOUND IMAGING | Age: 83
End: 2023-04-13
Payer: MEDICARE

## 2023-04-13 PROBLEM — R78.81 BACTEREMIA: Status: ACTIVE | Noted: 2023-04-13

## 2023-04-13 LAB
A BAUMANNII DNA BLD POS QL NAA+NON-PROBE: NOT DETECTED
ALBUMIN SERPL-MCNC: 3.6 G/DL (ref 3.5–5.2)
ALP SERPL-CCNC: 134 U/L (ref 40–129)
ALT SERPL-CCNC: 15 U/L (ref 0–40)
ANION GAP SERPL CALCULATED.3IONS-SCNC: 11 MMOL/L (ref 7–16)
AST SERPL-CCNC: 21 U/L (ref 0–39)
BASOPHILS # BLD: 0.01 E9/L (ref 0–0.2)
BASOPHILS NFR BLD: 0.1 % (ref 0–2)
BILIRUB SERPL-MCNC: 1 MG/DL (ref 0–1.2)
BLACTX-M ISLT/SPM QL: DETECTED
BLAIMP ISLT/SPM QL: NOT DETECTED
BLAKPC ISLT/SPM QL: NOT DETECTED
BLAOXA-48 ISLT/SPM QL: NOT DETECTED
BLAVIM ISLT/SPM QL: NOT DETECTED
BOTTLE TYPE: ABNORMAL
BUN SERPL-MCNC: 45 MG/DL (ref 6–23)
C ALBICANS DNA BLD POS QL NAA+NON-PROBE: NOT DETECTED
C AURIS DNA BLD POS QL NAA+PROBE: NOT DETECTED
C GLABRATA DNA BLD POS QL NAA+NON-PROBE: NOT DETECTED
C KRUSEI DNA BLD POS QL NAA+NON-PROBE: NOT DETECTED
C PARAP DNA BLD POS QL NAA+NON-PROBE: NOT DETECTED
C TROPICLS DNA BLD POS QL NAA+NON-PROBE: NOT DETECTED
CALCIUM SERPL-MCNC: 8.7 MG/DL (ref 8.6–10.2)
CARBAPENEM RESISTANCE NDM GENE BY PCR: NOT DETECTED
CHLORIDE SERPL-SCNC: 101 MMOL/L (ref 98–107)
CHLORIDE UR-SCNC: 37 MMOL/L
CO2 SERPL-SCNC: 21 MMOL/L (ref 22–29)
COLISTIN RES MCR-1 ISLT/SPM QL: NOT DETECTED
CREAT SERPL-MCNC: 1.7 MG/DL (ref 0.7–1.2)
CREAT UR-MCNC: 108 MG/DL (ref 40–278)
CRP SERPL HS-MCNC: 20.3 MG/DL (ref 0–0.4)
CRYPTOCOCCUS NEOFORMANS/GATTII BY PCR: NOT DETECTED
E CLOAC COMP DNA BLD POS NAA+NON-PROBE: NOT DETECTED
E COLI DNA BLD POS QL NAA+NON-PROBE: NOT DETECTED
E FAECALIS DNA BLD POS QL NAA+PROBE: NOT DETECTED
E FAECIUM DNA BLD POS QL NAA+PROBE: NOT DETECTED
EKG ATRIAL RATE: 375 BPM
EKG Q-T INTERVAL: 400 MS
EKG QRS DURATION: 134 MS
EKG QTC CALCULATION (BAZETT): 412 MS
EKG R AXIS: 82 DEGREES
EKG T AXIS: -64 DEGREES
EKG VENTRICULAR RATE: 64 BPM
ENTEROBACT DNA BLD POS QL NAA+NON-PROBE: DETECTED
ENTEROCOC DNA BLD POS QL NAA+NON-PROBE: DETECTED
EOSINOPHIL # BLD: 0 E9/L (ref 0.05–0.5)
EOSINOPHIL NFR BLD: 0 % (ref 0–6)
ERYTHROCYTE [DISTWIDTH] IN BLOOD BY AUTOMATED COUNT: 14.6 FL (ref 11.5–15)
ERYTHROCYTE [SEDIMENTATION RATE] IN BLOOD BY WESTERGREN METHOD: 47 MM/HR (ref 0–15)
GLUCOSE SERPL-MCNC: 142 MG/DL (ref 74–99)
GN BLD CULTURE PNL BLD POS NAA+PROBE: NOT DETECTED
HBA1C MFR BLD: 5.3 % (ref 4–5.6)
HCT VFR BLD AUTO: 29.3 % (ref 37–54)
HGB BLD-MCNC: 9.7 G/DL (ref 12.5–16.5)
IMM GRANULOCYTES # BLD: 0.05 E9/L
IMM GRANULOCYTES NFR BLD: 0.6 % (ref 0–5)
K OXYTOCA DNA BLD POS QL NAA+NON-PROBE: NOT DETECTED
K PNEUMON DNA SPEC QL NAA+PROBE: NOT DETECTED
K. AEROGENES DNA SPEC QL NAA+PROBE: NOT DETECTED
L MONOCYTOG DNA BLD POS QL NAA+NON-PROBE: NOT DETECTED
LV EF: 66 %
LVEF MODALITY: NORMAL
LYMPHOCYTES # BLD: 0.34 E9/L (ref 1.5–4)
LYMPHOCYTES NFR BLD: 4 % (ref 20–42)
MCH RBC QN AUTO: 31.9 PG (ref 26–35)
MCHC RBC AUTO-ENTMCNC: 33.1 % (ref 32–34.5)
MCV RBC AUTO: 96.4 FL (ref 80–99.9)
METER GLUCOSE: 115 MG/DL (ref 74–99)
METER GLUCOSE: 124 MG/DL (ref 74–99)
METER GLUCOSE: 139 MG/DL (ref 74–99)
METER GLUCOSE: 188 MG/DL (ref 74–99)
MONOCYTES # BLD: 0.47 E9/L (ref 0.1–0.95)
MONOCYTES NFR BLD: 5.6 % (ref 2–12)
N MEN DNA BLD POS QL NAA+NON-PROBE: NOT DETECTED
NEUTROPHILS # BLD: 7.56 E9/L (ref 1.8–7.3)
NEUTS SEG NFR BLD: 89.7 % (ref 43–80)
ORDER NUMBER: ABNORMAL
OSMOLALITY URINE: 526 MOSM/KG (ref 300–900)
OVALOCYTES: ABNORMAL
P AERUGINOSA DNA BLD POS NAA+NON-PROBE: NOT DETECTED
PLATELET # BLD AUTO: 90 E9/L (ref 130–450)
PLATELET CONFIRMATION: NORMAL
PMV BLD AUTO: 11.6 FL (ref 7–12)
POIKILOCYTES: ABNORMAL
POTASSIUM SERPL-SCNC: 4.8 MMOL/L (ref 3.5–5)
POTASSIUM UR-SCNC: 56.4 MMOL/L
PROCALCITONIN: 7.82 NG/ML (ref 0–0.08)
PROT SERPL-MCNC: 7.1 G/DL (ref 6.4–8.3)
PROTEUS SP DNA BLD POS QL NAA+NON-PROBE: NOT DETECTED
RBC # BLD AUTO: 3.04 E12/L (ref 3.8–5.8)
S AUREUS DNA BLD POS QL NAA+NON-PROBE: NOT DETECTED
S AUREUS+CONS DNA BLD POS NAA+NON-PROBE: NOT DETECTED
S EPIDERMIDIS DNA BLD POS QL NAA+PROBE: NOT DETECTED
S LUGDUNENSIS DNA BLD POS QL NAA+PROBE: NOT DETECTED
S MALTOPH DNA BLD POS QL NAA+PROBE: NOT DETECTED
S MARCESCENS DNA BLD POS NAA+NON-PROBE: NOT DETECTED
S PNEUM DNA BLD POS QL NAA+NON-PROBE: NOT DETECTED
S PYO DNA BLD POS QL NAA+NON-PROBE: NOT DETECTED
SALMONELLA DNA BLD POS QL NAA+PROBE: NOT DETECTED
SODIUM SERPL-SCNC: 133 MMOL/L (ref 132–146)
SODIUM UR-SCNC: 37 MMOL/L
SOURCE OF BLOOD CULTURE: ABNORMAL
STREPTOCOCCUS AGALACTIAE BY PCR: NOT DETECTED
STREPTOCOCCUS DNA BLD POS NAA+NON-PROBE: NOT DETECTED
UREA NITROGEN, UR: 861 MG/DL (ref 800–1666)
WBC # BLD: 8.4 E9/L (ref 4.5–11.5)

## 2023-04-13 PROCEDURE — 51798 US URINE CAPACITY MEASURE: CPT

## 2023-04-13 PROCEDURE — 92526 ORAL FUNCTION THERAPY: CPT | Performed by: SPEECH-LANGUAGE PATHOLOGIST

## 2023-04-13 PROCEDURE — 85025 COMPLETE CBC W/AUTO DIFF WBC: CPT

## 2023-04-13 PROCEDURE — 6360000004 HC RX CONTRAST MEDICATION: Performed by: STUDENT IN AN ORGANIZED HEALTH CARE EDUCATION/TRAINING PROGRAM

## 2023-04-13 PROCEDURE — 6370000000 HC RX 637 (ALT 250 FOR IP): Performed by: STUDENT IN AN ORGANIZED HEALTH CARE EDUCATION/TRAINING PROGRAM

## 2023-04-13 PROCEDURE — 93010 ELECTROCARDIOGRAM REPORT: CPT | Performed by: INTERNAL MEDICINE

## 2023-04-13 PROCEDURE — 84133 ASSAY OF URINE POTASSIUM: CPT

## 2023-04-13 PROCEDURE — 85651 RBC SED RATE NONAUTOMATED: CPT

## 2023-04-13 PROCEDURE — 80053 COMPREHEN METABOLIC PANEL: CPT

## 2023-04-13 PROCEDURE — 92610 EVALUATE SWALLOWING FUNCTION: CPT | Performed by: SPEECH-LANGUAGE PATHOLOGIST

## 2023-04-13 PROCEDURE — 93306 TTE W/DOPPLER COMPLETE: CPT

## 2023-04-13 PROCEDURE — 6370000000 HC RX 637 (ALT 250 FOR IP): Performed by: UROLOGY

## 2023-04-13 PROCEDURE — 84540 ASSAY OF URINE/UREA-N: CPT

## 2023-04-13 PROCEDURE — 86140 C-REACTIVE PROTEIN: CPT

## 2023-04-13 PROCEDURE — 83935 ASSAY OF URINE OSMOLALITY: CPT

## 2023-04-13 PROCEDURE — 2580000003 HC RX 258: Performed by: SPECIALIST

## 2023-04-13 PROCEDURE — 76770 US EXAM ABDO BACK WALL COMP: CPT

## 2023-04-13 PROCEDURE — 82436 ASSAY OF URINE CHLORIDE: CPT

## 2023-04-13 PROCEDURE — 99221 1ST HOSP IP/OBS SF/LOW 40: CPT | Performed by: NURSE PRACTITIONER

## 2023-04-13 PROCEDURE — 6360000002 HC RX W HCPCS: Performed by: STUDENT IN AN ORGANIZED HEALTH CARE EDUCATION/TRAINING PROGRAM

## 2023-04-13 PROCEDURE — 82962 GLUCOSE BLOOD TEST: CPT

## 2023-04-13 PROCEDURE — 84300 ASSAY OF URINE SODIUM: CPT

## 2023-04-13 PROCEDURE — 36415 COLL VENOUS BLD VENIPUNCTURE: CPT

## 2023-04-13 PROCEDURE — 82570 ASSAY OF URINE CREATININE: CPT

## 2023-04-13 PROCEDURE — 83036 HEMOGLOBIN GLYCOSYLATED A1C: CPT

## 2023-04-13 PROCEDURE — 84145 PROCALCITONIN (PCT): CPT

## 2023-04-13 PROCEDURE — 2060000000 HC ICU INTERMEDIATE R&B

## 2023-04-13 PROCEDURE — 2580000003 HC RX 258: Performed by: STUDENT IN AN ORGANIZED HEALTH CARE EDUCATION/TRAINING PROGRAM

## 2023-04-13 PROCEDURE — A4216 STERILE WATER/SALINE, 10 ML: HCPCS | Performed by: SPECIALIST

## 2023-04-13 PROCEDURE — 6360000002 HC RX W HCPCS: Performed by: SPECIALIST

## 2023-04-13 RX ORDER — SODIUM CHLORIDE 0.9 % (FLUSH) 0.9 %
5-40 SYRINGE (ML) INJECTION EVERY 12 HOURS SCHEDULED
Status: DISCONTINUED | OUTPATIENT
Start: 2023-04-13 | End: 2023-04-20 | Stop reason: HOSPADM

## 2023-04-13 RX ORDER — SODIUM CHLORIDE 9 MG/ML
INJECTION, SOLUTION INTRAVENOUS PRN
Status: DISCONTINUED | OUTPATIENT
Start: 2023-04-13 | End: 2023-04-20 | Stop reason: HOSPADM

## 2023-04-13 RX ORDER — SODIUM CHLORIDE 0.9 % (FLUSH) 0.9 %
5-40 SYRINGE (ML) INJECTION PRN
Status: DISCONTINUED | OUTPATIENT
Start: 2023-04-13 | End: 2023-04-20 | Stop reason: HOSPADM

## 2023-04-13 RX ORDER — SODIUM CHLORIDE 9 MG/ML
INJECTION, SOLUTION INTRAVENOUS CONTINUOUS
Status: DISCONTINUED | OUTPATIENT
Start: 2023-04-13 | End: 2023-04-14

## 2023-04-13 RX ADMIN — FUROSEMIDE 20 MG: 20 TABLET ORAL at 10:06

## 2023-04-13 RX ADMIN — METOPROLOL TARTRATE 25 MG: 25 TABLET, FILM COATED ORAL at 20:40

## 2023-04-13 RX ADMIN — ACETAMINOPHEN 650 MG: 325 TABLET ORAL at 03:58

## 2023-04-13 RX ADMIN — Medication 1000 UNITS: at 10:06

## 2023-04-13 RX ADMIN — CEFEPIME 1000 MG: 1 INJECTION, POWDER, FOR SOLUTION INTRAMUSCULAR; INTRAVENOUS at 01:53

## 2023-04-13 RX ADMIN — PERFLUTREN 1.5 ML: 6.52 INJECTION, SUSPENSION INTRAVENOUS at 11:13

## 2023-04-13 RX ADMIN — SODIUM CHLORIDE 1000 MG: 9 INJECTION INTRAMUSCULAR; INTRAVENOUS; SUBCUTANEOUS at 12:43

## 2023-04-13 RX ADMIN — ACETAMINOPHEN 650 MG: 325 TABLET ORAL at 10:06

## 2023-04-13 RX ADMIN — SODIUM CHLORIDE, PRESERVATIVE FREE 10 ML: 5 INJECTION INTRAVENOUS at 01:41

## 2023-04-13 RX ADMIN — SODIUM CHLORIDE, POTASSIUM CHLORIDE, SODIUM LACTATE AND CALCIUM CHLORIDE: 600; 310; 30; 20 INJECTION, SOLUTION INTRAVENOUS at 01:32

## 2023-04-13 RX ADMIN — ATORVASTATIN CALCIUM 40 MG: 40 TABLET, FILM COATED ORAL at 01:28

## 2023-04-13 RX ADMIN — ACETAMINOPHEN 650 MG: 325 TABLET ORAL at 19:38

## 2023-04-13 RX ADMIN — ATORVASTATIN CALCIUM 40 MG: 40 TABLET, FILM COATED ORAL at 20:40

## 2023-04-13 RX ADMIN — ENOXAPARIN SODIUM 40 MG: 100 INJECTION SUBCUTANEOUS at 10:06

## 2023-04-13 ASSESSMENT — PAIN - FUNCTIONAL ASSESSMENT: PAIN_FUNCTIONAL_ASSESSMENT: NONE - DENIES PAIN

## 2023-04-13 ASSESSMENT — PAIN SCALES - GENERAL: PAINLEVEL_OUTOF10: 0

## 2023-04-14 LAB
ALBUMIN SERPL-MCNC: 3.1 G/DL (ref 3.5–5.2)
ALP SERPL-CCNC: 120 U/L (ref 40–129)
ALT SERPL-CCNC: 18 U/L (ref 0–40)
ANION GAP SERPL CALCULATED.3IONS-SCNC: 11 MMOL/L (ref 7–16)
AST SERPL-CCNC: 26 U/L (ref 0–39)
BASOPHILS # BLD: 0.07 E9/L (ref 0–0.2)
BASOPHILS NFR BLD: 0.9 % (ref 0–2)
BILIRUB SERPL-MCNC: 0.7 MG/DL (ref 0–1.2)
BUN SERPL-MCNC: 43 MG/DL (ref 6–23)
BURR CELLS: ABNORMAL
CALCIUM SERPL-MCNC: 8.6 MG/DL (ref 8.6–10.2)
CHLORIDE SERPL-SCNC: 103 MMOL/L (ref 98–107)
CO2 SERPL-SCNC: 22 MMOL/L (ref 22–29)
CREAT SERPL-MCNC: 1.5 MG/DL (ref 0.7–1.2)
CRP SERPL HS-MCNC: 20.7 MG/DL (ref 0–0.4)
EOSINOPHIL # BLD: 0 E9/L (ref 0.05–0.5)
EOSINOPHIL NFR BLD: 0 % (ref 0–6)
ERYTHROCYTE [DISTWIDTH] IN BLOOD BY AUTOMATED COUNT: 14.5 FL (ref 11.5–15)
ERYTHROCYTE [SEDIMENTATION RATE] IN BLOOD BY WESTERGREN METHOD: 43 MM/HR (ref 0–15)
GLUCOSE SERPL-MCNC: 108 MG/DL (ref 74–99)
HCT VFR BLD AUTO: 27.8 % (ref 37–54)
HGB BLD-MCNC: 9.2 G/DL (ref 12.5–16.5)
LYMPHOCYTES # BLD: 0.3 E9/L (ref 1.5–4)
LYMPHOCYTES NFR BLD: 3.5 % (ref 20–42)
MCH RBC QN AUTO: 31.2 PG (ref 26–35)
MCHC RBC AUTO-ENTMCNC: 33.1 % (ref 32–34.5)
MCV RBC AUTO: 94.2 FL (ref 80–99.9)
METER GLUCOSE: 108 MG/DL (ref 74–99)
METER GLUCOSE: 125 MG/DL (ref 74–99)
METER GLUCOSE: 137 MG/DL (ref 74–99)
METER GLUCOSE: 158 MG/DL (ref 74–99)
MONOCYTES # BLD: 0.3 E9/L (ref 0.1–0.95)
MONOCYTES NFR BLD: 4.3 % (ref 2–12)
NEUTROPHILS # BLD: 6.83 E9/L (ref 1.8–7.3)
NEUTS SEG NFR BLD: 91.3 % (ref 43–80)
NRBC BLD-RTO: 0 /100 WBC
OVALOCYTES: ABNORMAL
PHOSPHATE SERPL-MCNC: 3.5 MG/DL (ref 2.5–4.5)
PLATELET # BLD AUTO: 96 E9/L (ref 130–450)
PLATELET CONFIRMATION: NORMAL
PMV BLD AUTO: 10.8 FL (ref 7–12)
POIKILOCYTES: ABNORMAL
POTASSIUM SERPL-SCNC: 4.4 MMOL/L (ref 3.5–5)
PROT SERPL-MCNC: 6.1 G/DL (ref 6.4–8.3)
RBC # BLD AUTO: 2.95 E12/L (ref 3.8–5.8)
SCHISTOCYTES: ABNORMAL
SODIUM SERPL-SCNC: 136 MMOL/L (ref 132–146)
WBC # BLD: 7.5 E9/L (ref 4.5–11.5)

## 2023-04-14 PROCEDURE — 86140 C-REACTIVE PROTEIN: CPT

## 2023-04-14 PROCEDURE — 6360000002 HC RX W HCPCS: Performed by: STUDENT IN AN ORGANIZED HEALTH CARE EDUCATION/TRAINING PROGRAM

## 2023-04-14 PROCEDURE — 2060000000 HC ICU INTERMEDIATE R&B

## 2023-04-14 PROCEDURE — 36415 COLL VENOUS BLD VENIPUNCTURE: CPT

## 2023-04-14 PROCEDURE — 82962 GLUCOSE BLOOD TEST: CPT

## 2023-04-14 PROCEDURE — 85651 RBC SED RATE NONAUTOMATED: CPT

## 2023-04-14 PROCEDURE — 85025 COMPLETE CBC W/AUTO DIFF WBC: CPT

## 2023-04-14 PROCEDURE — 2580000003 HC RX 258: Performed by: SPECIALIST

## 2023-04-14 PROCEDURE — 6370000000 HC RX 637 (ALT 250 FOR IP): Performed by: UROLOGY

## 2023-04-14 PROCEDURE — 6370000000 HC RX 637 (ALT 250 FOR IP): Performed by: STUDENT IN AN ORGANIZED HEALTH CARE EDUCATION/TRAINING PROGRAM

## 2023-04-14 PROCEDURE — 97530 THERAPEUTIC ACTIVITIES: CPT

## 2023-04-14 PROCEDURE — 80053 COMPREHEN METABOLIC PANEL: CPT

## 2023-04-14 PROCEDURE — A4216 STERILE WATER/SALINE, 10 ML: HCPCS | Performed by: SPECIALIST

## 2023-04-14 PROCEDURE — 84100 ASSAY OF PHOSPHORUS: CPT

## 2023-04-14 PROCEDURE — 97161 PT EVAL LOW COMPLEX 20 MIN: CPT

## 2023-04-14 PROCEDURE — 2580000003 HC RX 258: Performed by: STUDENT IN AN ORGANIZED HEALTH CARE EDUCATION/TRAINING PROGRAM

## 2023-04-14 PROCEDURE — 6360000002 HC RX W HCPCS: Performed by: SPECIALIST

## 2023-04-14 PROCEDURE — 2580000003 HC RX 258: Performed by: UROLOGY

## 2023-04-14 PROCEDURE — 97165 OT EVAL LOW COMPLEX 30 MIN: CPT

## 2023-04-14 RX ORDER — CALCIUM CARBONATE 200(500)MG
500 TABLET,CHEWABLE ORAL 3 TIMES DAILY PRN
Status: DISCONTINUED | OUTPATIENT
Start: 2023-04-14 | End: 2023-04-20 | Stop reason: HOSPADM

## 2023-04-14 RX ORDER — SIMETHICONE 80 MG
80 TABLET,CHEWABLE ORAL EVERY 6 HOURS PRN
Status: DISCONTINUED | OUTPATIENT
Start: 2023-04-14 | End: 2023-04-20 | Stop reason: HOSPADM

## 2023-04-14 RX ADMIN — SODIUM CHLORIDE 1000 MG: 9 INJECTION INTRAMUSCULAR; INTRAVENOUS; SUBCUTANEOUS at 12:48

## 2023-04-14 RX ADMIN — ACETAMINOPHEN 650 MG: 325 TABLET ORAL at 08:33

## 2023-04-14 RX ADMIN — METOPROLOL TARTRATE 25 MG: 25 TABLET, FILM COATED ORAL at 21:38

## 2023-04-14 RX ADMIN — ENOXAPARIN SODIUM 40 MG: 100 INJECTION SUBCUTANEOUS at 08:32

## 2023-04-14 RX ADMIN — ATORVASTATIN CALCIUM 40 MG: 40 TABLET, FILM COATED ORAL at 21:38

## 2023-04-14 RX ADMIN — SODIUM CHLORIDE, PRESERVATIVE FREE 10 ML: 5 INJECTION INTRAVENOUS at 08:33

## 2023-04-14 RX ADMIN — SODIUM CHLORIDE, PRESERVATIVE FREE 10 ML: 5 INJECTION INTRAVENOUS at 21:39

## 2023-04-14 RX ADMIN — SODIUM CHLORIDE, PRESERVATIVE FREE 10 ML: 5 INJECTION INTRAVENOUS at 21:38

## 2023-04-14 RX ADMIN — Medication 1000 UNITS: at 08:32

## 2023-04-14 RX ADMIN — FUROSEMIDE 20 MG: 20 TABLET ORAL at 08:32

## 2023-04-14 ASSESSMENT — PAIN DESCRIPTION - LOCATION: LOCATION: HIP

## 2023-04-14 ASSESSMENT — PAIN SCALES - GENERAL
PAINLEVEL_OUTOF10: 3
PAINLEVEL_OUTOF10: 0

## 2023-04-14 ASSESSMENT — PAIN DESCRIPTION - ORIENTATION: ORIENTATION: RIGHT

## 2023-04-14 ASSESSMENT — PAIN DESCRIPTION - DESCRIPTORS: DESCRIPTORS: ACHING

## 2023-04-15 LAB
ACANTHOCYTES: ABNORMAL
ALBUMIN SERPL-MCNC: 3.4 G/DL (ref 3.5–5.2)
ALP SERPL-CCNC: 140 U/L (ref 40–129)
ALT SERPL-CCNC: 35 U/L (ref 0–40)
ANION GAP SERPL CALCULATED.3IONS-SCNC: 12 MMOL/L (ref 7–16)
ANISOCYTOSIS: ABNORMAL
AST SERPL-CCNC: 44 U/L (ref 0–39)
BACTERIA BLD CULT ORG #2: ABNORMAL
BACTERIA BLD CULT ORG #2: ABNORMAL
BASOPHILS # BLD: 0.02 E9/L (ref 0–0.2)
BASOPHILS NFR BLD: 0.2 % (ref 0–2)
BILIRUB SERPL-MCNC: 0.6 MG/DL (ref 0–1.2)
BUN SERPL-MCNC: 36 MG/DL (ref 6–23)
CALCIUM SERPL-MCNC: 8.9 MG/DL (ref 8.6–10.2)
CHLORIDE SERPL-SCNC: 100 MMOL/L (ref 98–107)
CO2 SERPL-SCNC: 22 MMOL/L (ref 22–29)
CREAT SERPL-MCNC: 1.3 MG/DL (ref 0.7–1.2)
EOSINOPHIL # BLD: 0.06 E9/L (ref 0.05–0.5)
EOSINOPHIL NFR BLD: 0.7 % (ref 0–6)
ERYTHROCYTE [DISTWIDTH] IN BLOOD BY AUTOMATED COUNT: 14.6 FL (ref 11.5–15)
GLUCOSE SERPL-MCNC: 111 MG/DL (ref 74–99)
HCT VFR BLD AUTO: 30.3 % (ref 37–54)
HGB BLD-MCNC: 10.1 G/DL (ref 12.5–16.5)
IMM GRANULOCYTES # BLD: 0.02 E9/L
IMM GRANULOCYTES NFR BLD: 0.2 % (ref 0–5)
LYMPHOCYTES # BLD: 0.94 E9/L (ref 1.5–4)
LYMPHOCYTES NFR BLD: 11.4 % (ref 20–42)
MCH RBC QN AUTO: 31.4 PG (ref 26–35)
MCHC RBC AUTO-ENTMCNC: 33.3 % (ref 32–34.5)
MCV RBC AUTO: 94.1 FL (ref 80–99.9)
METER GLUCOSE: 116 MG/DL (ref 74–99)
METER GLUCOSE: 136 MG/DL (ref 74–99)
METER GLUCOSE: 145 MG/DL (ref 74–99)
METER GLUCOSE: 163 MG/DL (ref 74–99)
MONOCYTES # BLD: 0.91 E9/L (ref 0.1–0.95)
MONOCYTES NFR BLD: 11 % (ref 2–12)
NEUTROPHILS # BLD: 6.3 E9/L (ref 1.8–7.3)
NEUTS SEG NFR BLD: 76.5 % (ref 43–80)
ORGANISM: ABNORMAL
OVALOCYTES: ABNORMAL
PLATELET # BLD AUTO: 127 E9/L (ref 130–450)
PMV BLD AUTO: 10.6 FL (ref 7–12)
POTASSIUM SERPL-SCNC: 4.2 MMOL/L (ref 3.5–5)
PROT SERPL-MCNC: 6.9 G/DL (ref 6.4–8.3)
RBC # BLD AUTO: 3.22 E12/L (ref 3.8–5.8)
SODIUM SERPL-SCNC: 134 MMOL/L (ref 132–146)
WBC # BLD: 8.3 E9/L (ref 4.5–11.5)

## 2023-04-15 PROCEDURE — 6370000000 HC RX 637 (ALT 250 FOR IP): Performed by: REGISTERED NURSE

## 2023-04-15 PROCEDURE — 2580000003 HC RX 258: Performed by: STUDENT IN AN ORGANIZED HEALTH CARE EDUCATION/TRAINING PROGRAM

## 2023-04-15 PROCEDURE — 6370000000 HC RX 637 (ALT 250 FOR IP): Performed by: STUDENT IN AN ORGANIZED HEALTH CARE EDUCATION/TRAINING PROGRAM

## 2023-04-15 PROCEDURE — 2580000003 HC RX 258: Performed by: UROLOGY

## 2023-04-15 PROCEDURE — 85025 COMPLETE CBC W/AUTO DIFF WBC: CPT

## 2023-04-15 PROCEDURE — 2060000000 HC ICU INTERMEDIATE R&B

## 2023-04-15 PROCEDURE — 82962 GLUCOSE BLOOD TEST: CPT

## 2023-04-15 PROCEDURE — 6360000002 HC RX W HCPCS: Performed by: STUDENT IN AN ORGANIZED HEALTH CARE EDUCATION/TRAINING PROGRAM

## 2023-04-15 PROCEDURE — 6360000002 HC RX W HCPCS: Performed by: SPECIALIST

## 2023-04-15 PROCEDURE — 2580000003 HC RX 258: Performed by: SPECIALIST

## 2023-04-15 PROCEDURE — 6370000000 HC RX 637 (ALT 250 FOR IP): Performed by: UROLOGY

## 2023-04-15 PROCEDURE — 87040 BLOOD CULTURE FOR BACTERIA: CPT

## 2023-04-15 PROCEDURE — 6370000000 HC RX 637 (ALT 250 FOR IP): Performed by: INTERNAL MEDICINE

## 2023-04-15 PROCEDURE — 80053 COMPREHEN METABOLIC PANEL: CPT

## 2023-04-15 PROCEDURE — 36415 COLL VENOUS BLD VENIPUNCTURE: CPT

## 2023-04-15 PROCEDURE — A4216 STERILE WATER/SALINE, 10 ML: HCPCS | Performed by: SPECIALIST

## 2023-04-15 RX ORDER — METOCLOPRAMIDE 10 MG/1
5 TABLET ORAL ONCE
Status: COMPLETED | OUTPATIENT
Start: 2023-04-15 | End: 2023-04-15

## 2023-04-15 RX ORDER — BACLOFEN 10 MG/1
10 TABLET ORAL ONCE
Status: COMPLETED | OUTPATIENT
Start: 2023-04-15 | End: 2023-04-15

## 2023-04-15 RX ORDER — AMLODIPINE BESYLATE 5 MG/1
5 TABLET ORAL DAILY
Status: DISCONTINUED | OUTPATIENT
Start: 2023-04-15 | End: 2023-04-16

## 2023-04-15 RX ADMIN — SODIUM CHLORIDE, PRESERVATIVE FREE 10 ML: 5 INJECTION INTRAVENOUS at 20:13

## 2023-04-15 RX ADMIN — METOCLOPRAMIDE 5 MG: 10 TABLET ORAL at 12:51

## 2023-04-15 RX ADMIN — BACLOFEN 10 MG: 10 TABLET ORAL at 15:31

## 2023-04-15 RX ADMIN — ENOXAPARIN SODIUM 40 MG: 100 INJECTION SUBCUTANEOUS at 09:26

## 2023-04-15 RX ADMIN — Medication 1000 UNITS: at 09:26

## 2023-04-15 RX ADMIN — ACETAMINOPHEN 650 MG: 325 TABLET ORAL at 20:12

## 2023-04-15 RX ADMIN — FUROSEMIDE 20 MG: 20 TABLET ORAL at 09:26

## 2023-04-15 RX ADMIN — ATORVASTATIN CALCIUM 40 MG: 40 TABLET, FILM COATED ORAL at 20:13

## 2023-04-15 RX ADMIN — METOPROLOL TARTRATE 25 MG: 25 TABLET, FILM COATED ORAL at 20:13

## 2023-04-15 RX ADMIN — SODIUM CHLORIDE 1000 MG: 9 INJECTION INTRAMUSCULAR; INTRAVENOUS; SUBCUTANEOUS at 12:51

## 2023-04-15 RX ADMIN — SODIUM CHLORIDE, PRESERVATIVE FREE 10 ML: 5 INJECTION INTRAVENOUS at 09:28

## 2023-04-15 RX ADMIN — AMLODIPINE BESYLATE 5 MG: 5 TABLET ORAL at 15:31

## 2023-04-15 RX ADMIN — SODIUM CHLORIDE, PRESERVATIVE FREE 10 ML: 5 INJECTION INTRAVENOUS at 20:15

## 2023-04-15 ASSESSMENT — PAIN - FUNCTIONAL ASSESSMENT: PAIN_FUNCTIONAL_ASSESSMENT: PREVENTS OR INTERFERES SOME ACTIVE ACTIVITIES AND ADLS

## 2023-04-15 ASSESSMENT — PAIN SCALES - GENERAL
PAINLEVEL_OUTOF10: 0
PAINLEVEL_OUTOF10: 3

## 2023-04-15 ASSESSMENT — PAIN DESCRIPTION - ORIENTATION: ORIENTATION: MID

## 2023-04-15 ASSESSMENT — PAIN DESCRIPTION - LOCATION: LOCATION: GENERALIZED

## 2023-04-15 ASSESSMENT — PAIN DESCRIPTION - DESCRIPTORS: DESCRIPTORS: ACHING

## 2023-04-16 ENCOUNTER — APPOINTMENT (OUTPATIENT)
Dept: GENERAL RADIOLOGY | Age: 83
End: 2023-04-16
Payer: MEDICARE

## 2023-04-16 LAB
BACTERIA BLD CULT: ABNORMAL
BACTERIA UR CULT: ABNORMAL
METER GLUCOSE: 106 MG/DL (ref 74–99)
METER GLUCOSE: 112 MG/DL (ref 74–99)
METER GLUCOSE: 147 MG/DL (ref 74–99)
METER GLUCOSE: 190 MG/DL (ref 74–99)
ORGANISM: ABNORMAL
ORGANISM: ABNORMAL

## 2023-04-16 PROCEDURE — A4216 STERILE WATER/SALINE, 10 ML: HCPCS | Performed by: SPECIALIST

## 2023-04-16 PROCEDURE — 02HV33Z INSERTION OF INFUSION DEVICE INTO SUPERIOR VENA CAVA, PERCUTANEOUS APPROACH: ICD-10-PCS | Performed by: UROLOGY

## 2023-04-16 PROCEDURE — 2500000003 HC RX 250 WO HCPCS: Performed by: REGISTERED NURSE

## 2023-04-16 PROCEDURE — 6360000002 HC RX W HCPCS: Performed by: REGISTERED NURSE

## 2023-04-16 PROCEDURE — 2580000003 HC RX 258: Performed by: SPECIALIST

## 2023-04-16 PROCEDURE — 71045 X-RAY EXAM CHEST 1 VIEW: CPT

## 2023-04-16 PROCEDURE — 6370000000 HC RX 637 (ALT 250 FOR IP): Performed by: INTERNAL MEDICINE

## 2023-04-16 PROCEDURE — 6370000000 HC RX 637 (ALT 250 FOR IP): Performed by: UROLOGY

## 2023-04-16 PROCEDURE — 6360000002 HC RX W HCPCS: Performed by: STUDENT IN AN ORGANIZED HEALTH CARE EDUCATION/TRAINING PROGRAM

## 2023-04-16 PROCEDURE — 2580000003 HC RX 258: Performed by: STUDENT IN AN ORGANIZED HEALTH CARE EDUCATION/TRAINING PROGRAM

## 2023-04-16 PROCEDURE — 2580000003 HC RX 258: Performed by: REGISTERED NURSE

## 2023-04-16 PROCEDURE — 82962 GLUCOSE BLOOD TEST: CPT

## 2023-04-16 PROCEDURE — 2060000000 HC ICU INTERMEDIATE R&B

## 2023-04-16 PROCEDURE — 76937 US GUIDE VASCULAR ACCESS: CPT

## 2023-04-16 PROCEDURE — 6360000002 HC RX W HCPCS: Performed by: SPECIALIST

## 2023-04-16 PROCEDURE — C1751 CATH, INF, PER/CENT/MIDLINE: HCPCS

## 2023-04-16 PROCEDURE — 36569 INSJ PICC 5 YR+ W/O IMAGING: CPT

## 2023-04-16 RX ORDER — METOCLOPRAMIDE 10 MG/1
10 TABLET ORAL 3 TIMES DAILY PRN
Status: DISCONTINUED | OUTPATIENT
Start: 2023-04-16 | End: 2023-04-20 | Stop reason: HOSPADM

## 2023-04-16 RX ORDER — AMLODIPINE BESYLATE 5 MG/1
5 TABLET ORAL NIGHTLY
Status: DISCONTINUED | OUTPATIENT
Start: 2023-04-17 | End: 2023-04-20 | Stop reason: HOSPADM

## 2023-04-16 RX ORDER — SODIUM CHLORIDE 9 MG/ML
25 INJECTION, SOLUTION INTRAVENOUS PRN
Status: DISCONTINUED | OUTPATIENT
Start: 2023-04-16 | End: 2023-04-20 | Stop reason: HOSPADM

## 2023-04-16 RX ORDER — LIDOCAINE HYDROCHLORIDE 10 MG/ML
5 INJECTION, SOLUTION EPIDURAL; INFILTRATION; INTRACAUDAL; PERINEURAL ONCE
Status: COMPLETED | OUTPATIENT
Start: 2023-04-16 | End: 2023-04-16

## 2023-04-16 RX ORDER — HEPARIN SODIUM (PORCINE) LOCK FLUSH IV SOLN 100 UNIT/ML 100 UNIT/ML
3 SOLUTION INTRAVENOUS PRN
Status: DISCONTINUED | OUTPATIENT
Start: 2023-04-16 | End: 2023-04-20 | Stop reason: HOSPADM

## 2023-04-16 RX ORDER — HEPARIN SODIUM (PORCINE) LOCK FLUSH IV SOLN 100 UNIT/ML 100 UNIT/ML
3 SOLUTION INTRAVENOUS EVERY 12 HOURS SCHEDULED
Status: DISCONTINUED | OUTPATIENT
Start: 2023-04-16 | End: 2023-04-20 | Stop reason: HOSPADM

## 2023-04-16 RX ORDER — SODIUM CHLORIDE 0.9 % (FLUSH) 0.9 %
5-40 SYRINGE (ML) INJECTION PRN
Status: DISCONTINUED | OUTPATIENT
Start: 2023-04-16 | End: 2023-04-20 | Stop reason: HOSPADM

## 2023-04-16 RX ORDER — SODIUM CHLORIDE 0.9 % (FLUSH) 0.9 %
5-40 SYRINGE (ML) INJECTION EVERY 12 HOURS SCHEDULED
Status: DISCONTINUED | OUTPATIENT
Start: 2023-04-16 | End: 2023-04-20 | Stop reason: HOSPADM

## 2023-04-16 RX ADMIN — Medication 300 UNITS: at 20:27

## 2023-04-16 RX ADMIN — SODIUM CHLORIDE, PRESERVATIVE FREE 10 ML: 5 INJECTION INTRAVENOUS at 20:27

## 2023-04-16 RX ADMIN — FUROSEMIDE 20 MG: 20 TABLET ORAL at 08:41

## 2023-04-16 RX ADMIN — SODIUM CHLORIDE, PRESERVATIVE FREE 30 ML: 5 INJECTION INTRAVENOUS at 16:56

## 2023-04-16 RX ADMIN — ATORVASTATIN CALCIUM 40 MG: 40 TABLET, FILM COATED ORAL at 20:26

## 2023-04-16 RX ADMIN — SODIUM CHLORIDE, PRESERVATIVE FREE 10 ML: 5 INJECTION INTRAVENOUS at 08:41

## 2023-04-16 RX ADMIN — Medication 1000 UNITS: at 08:40

## 2023-04-16 RX ADMIN — SODIUM CHLORIDE 1000 MG: 9 INJECTION INTRAMUSCULAR; INTRAVENOUS; SUBCUTANEOUS at 12:53

## 2023-04-16 RX ADMIN — METOPROLOL TARTRATE 25 MG: 25 TABLET, FILM COATED ORAL at 20:26

## 2023-04-16 RX ADMIN — ENOXAPARIN SODIUM 40 MG: 100 INJECTION SUBCUTANEOUS at 08:40

## 2023-04-16 RX ADMIN — SODIUM CHLORIDE, PRESERVATIVE FREE 30 ML: 5 INJECTION INTRAVENOUS at 17:43

## 2023-04-16 RX ADMIN — LIDOCAINE HYDROCHLORIDE 2 ML: 10 SOLUTION INTRAVENOUS at 17:20

## 2023-04-16 RX ADMIN — AMLODIPINE BESYLATE 5 MG: 5 TABLET ORAL at 08:41

## 2023-04-16 ASSESSMENT — PAIN SCALES - GENERAL
PAINLEVEL_OUTOF10: 0

## 2023-04-17 ENCOUNTER — HOSPITAL ENCOUNTER (OUTPATIENT)
Dept: INFUSION THERAPY | Age: 83
Setting detail: INFUSION SERIES
Discharge: HOME | End: 2023-04-17

## 2023-04-17 LAB
ANION GAP SERPL CALCULATED.3IONS-SCNC: 9 MMOL/L (ref 7–16)
BUN SERPL-MCNC: 24 MG/DL (ref 6–23)
CALCIUM SERPL-MCNC: 9.2 MG/DL (ref 8.6–10.2)
CHLORIDE SERPL-SCNC: 105 MMOL/L (ref 98–107)
CO2 SERPL-SCNC: 25 MMOL/L (ref 22–29)
CREAT SERPL-MCNC: 0.9 MG/DL (ref 0.7–1.2)
GLUCOSE SERPL-MCNC: 107 MG/DL (ref 74–99)
METER GLUCOSE: 105 MG/DL (ref 74–99)
METER GLUCOSE: 146 MG/DL (ref 74–99)
METER GLUCOSE: 204 MG/DL (ref 74–99)
METER GLUCOSE: 86 MG/DL (ref 74–99)
POTASSIUM SERPL-SCNC: 4.8 MMOL/L (ref 3.5–5)
SODIUM SERPL-SCNC: 139 MMOL/L (ref 132–146)

## 2023-04-17 PROCEDURE — 2060000000 HC ICU INTERMEDIATE R&B

## 2023-04-17 PROCEDURE — 36415 COLL VENOUS BLD VENIPUNCTURE: CPT

## 2023-04-17 PROCEDURE — 6370000000 HC RX 637 (ALT 250 FOR IP): Performed by: UROLOGY

## 2023-04-17 PROCEDURE — 6360000002 HC RX W HCPCS: Performed by: REGISTERED NURSE

## 2023-04-17 PROCEDURE — 82962 GLUCOSE BLOOD TEST: CPT

## 2023-04-17 PROCEDURE — 6360000002 HC RX W HCPCS: Performed by: STUDENT IN AN ORGANIZED HEALTH CARE EDUCATION/TRAINING PROGRAM

## 2023-04-17 PROCEDURE — 92526 ORAL FUNCTION THERAPY: CPT | Performed by: SPEECH-LANGUAGE PATHOLOGIST

## 2023-04-17 PROCEDURE — 80048 BASIC METABOLIC PNL TOTAL CA: CPT

## 2023-04-17 PROCEDURE — 6370000000 HC RX 637 (ALT 250 FOR IP): Performed by: INTERNAL MEDICINE

## 2023-04-17 PROCEDURE — 2580000003 HC RX 258: Performed by: STUDENT IN AN ORGANIZED HEALTH CARE EDUCATION/TRAINING PROGRAM

## 2023-04-17 PROCEDURE — 2580000003 HC RX 258: Performed by: SPECIALIST

## 2023-04-17 PROCEDURE — 6360000002 HC RX W HCPCS: Performed by: SPECIALIST

## 2023-04-17 PROCEDURE — 6370000000 HC RX 637 (ALT 250 FOR IP): Performed by: STUDENT IN AN ORGANIZED HEALTH CARE EDUCATION/TRAINING PROGRAM

## 2023-04-17 PROCEDURE — A4216 STERILE WATER/SALINE, 10 ML: HCPCS | Performed by: SPECIALIST

## 2023-04-17 RX ORDER — LISINOPRIL 10 MG/1
10 TABLET ORAL DAILY
Status: DISCONTINUED | OUTPATIENT
Start: 2023-04-18 | End: 2023-04-20 | Stop reason: HOSPADM

## 2023-04-17 RX ADMIN — ATORVASTATIN CALCIUM 40 MG: 40 TABLET, FILM COATED ORAL at 21:06

## 2023-04-17 RX ADMIN — Medication 300 UNITS: at 21:06

## 2023-04-17 RX ADMIN — Medication 300 UNITS: at 10:16

## 2023-04-17 RX ADMIN — SODIUM CHLORIDE 1000 MG: 9 INJECTION INTRAMUSCULAR; INTRAVENOUS; SUBCUTANEOUS at 14:39

## 2023-04-17 RX ADMIN — INSULIN LISPRO 2 UNITS: 100 INJECTION, SOLUTION INTRAVENOUS; SUBCUTANEOUS at 12:57

## 2023-04-17 RX ADMIN — SODIUM CHLORIDE, PRESERVATIVE FREE 10 ML: 5 INJECTION INTRAVENOUS at 10:16

## 2023-04-17 RX ADMIN — METOPROLOL TARTRATE 25 MG: 25 TABLET, FILM COATED ORAL at 21:05

## 2023-04-17 RX ADMIN — SIMETHICONE 80 MG: 80 TABLET, CHEWABLE ORAL at 10:15

## 2023-04-17 RX ADMIN — ACETAMINOPHEN 650 MG: 325 TABLET ORAL at 10:21

## 2023-04-17 RX ADMIN — AMLODIPINE BESYLATE 5 MG: 5 TABLET ORAL at 21:06

## 2023-04-17 RX ADMIN — ENOXAPARIN SODIUM 40 MG: 100 INJECTION SUBCUTANEOUS at 10:15

## 2023-04-17 RX ADMIN — Medication 1000 UNITS: at 10:15

## 2023-04-17 RX ADMIN — SODIUM CHLORIDE, PRESERVATIVE FREE 10 ML: 5 INJECTION INTRAVENOUS at 21:08

## 2023-04-17 ASSESSMENT — PAIN DESCRIPTION - ORIENTATION: ORIENTATION: RIGHT;OUTER

## 2023-04-17 ASSESSMENT — PAIN SCALES - GENERAL
PAINLEVEL_OUTOF10: 3
PAINLEVEL_OUTOF10: 3
PAINLEVEL_OUTOF10: 0
PAINLEVEL_OUTOF10: 0

## 2023-04-17 ASSESSMENT — PAIN DESCRIPTION - DESCRIPTORS: DESCRIPTORS: SHARP

## 2023-04-17 ASSESSMENT — PAIN DESCRIPTION - LOCATION: LOCATION: KNEE

## 2023-04-17 NOTE — CARE COORDINATION
Social Work / Discharge Planning : SW followed up with patient and son. Patient and son verified plan at discharge is SNF for ST rehab. Choices discussed and spouse still reviewing list but first choice is [facility]. Referral to [facility]. Await acceptance/ denial. Will need pre-cert . Will need COVID. SW did explained to patient and son that patient PT am/pac was good and if he did NOT have the PiCC, insurance would NOT approve the skilled stay but because patient has the PICC and they feel they cannot manage at home, then will submit to insurance. [facility] accepted patient and they will continue to follow. [employer] on board. SW to follow.  Electronically signed by TRACE Lyn on 4/17/23 at 12:41 PM EDT

## 2023-04-18 LAB
METER GLUCOSE: 112 MG/DL (ref 74–99)
METER GLUCOSE: 130 MG/DL (ref 74–99)
METER GLUCOSE: 134 MG/DL (ref 74–99)
METER GLUCOSE: 139 MG/DL (ref 74–99)

## 2023-04-18 PROCEDURE — 97535 SELF CARE MNGMENT TRAINING: CPT

## 2023-04-18 PROCEDURE — 6370000000 HC RX 637 (ALT 250 FOR IP): Performed by: INTERNAL MEDICINE

## 2023-04-18 PROCEDURE — 51798 US URINE CAPACITY MEASURE: CPT

## 2023-04-18 PROCEDURE — 6360000002 HC RX W HCPCS: Performed by: STUDENT IN AN ORGANIZED HEALTH CARE EDUCATION/TRAINING PROGRAM

## 2023-04-18 PROCEDURE — 6370000000 HC RX 637 (ALT 250 FOR IP): Performed by: UROLOGY

## 2023-04-18 PROCEDURE — 2580000003 HC RX 258: Performed by: SPECIALIST

## 2023-04-18 PROCEDURE — 97530 THERAPEUTIC ACTIVITIES: CPT

## 2023-04-18 PROCEDURE — 2580000003 HC RX 258: Performed by: STUDENT IN AN ORGANIZED HEALTH CARE EDUCATION/TRAINING PROGRAM

## 2023-04-18 PROCEDURE — 6360000002 HC RX W HCPCS: Performed by: SPECIALIST

## 2023-04-18 PROCEDURE — 6360000002 HC RX W HCPCS: Performed by: REGISTERED NURSE

## 2023-04-18 PROCEDURE — A4216 STERILE WATER/SALINE, 10 ML: HCPCS | Performed by: SPECIALIST

## 2023-04-18 PROCEDURE — 2580000003 HC RX 258: Performed by: REGISTERED NURSE

## 2023-04-18 PROCEDURE — 2060000000 HC ICU INTERMEDIATE R&B

## 2023-04-18 PROCEDURE — 6370000000 HC RX 637 (ALT 250 FOR IP): Performed by: STUDENT IN AN ORGANIZED HEALTH CARE EDUCATION/TRAINING PROGRAM

## 2023-04-18 PROCEDURE — 82962 GLUCOSE BLOOD TEST: CPT

## 2023-04-18 RX ADMIN — SODIUM CHLORIDE, PRESERVATIVE FREE 5 ML: 5 INJECTION INTRAVENOUS at 21:49

## 2023-04-18 RX ADMIN — Medication 300 UNITS: at 09:46

## 2023-04-18 RX ADMIN — Medication 300 UNITS: at 21:31

## 2023-04-18 RX ADMIN — ATORVASTATIN CALCIUM 40 MG: 40 TABLET, FILM COATED ORAL at 21:31

## 2023-04-18 RX ADMIN — Medication 1000 UNITS: at 09:44

## 2023-04-18 RX ADMIN — SIMETHICONE 80 MG: 80 TABLET, CHEWABLE ORAL at 09:44

## 2023-04-18 RX ADMIN — HYDRALAZINE HYDROCHLORIDE 10 MG: 20 INJECTION INTRAMUSCULAR; INTRAVENOUS at 16:20

## 2023-04-18 RX ADMIN — METOPROLOL TARTRATE 25 MG: 25 TABLET, FILM COATED ORAL at 21:48

## 2023-04-18 RX ADMIN — AMLODIPINE BESYLATE 5 MG: 5 TABLET ORAL at 21:30

## 2023-04-18 RX ADMIN — LISINOPRIL 10 MG: 10 TABLET ORAL at 09:44

## 2023-04-18 RX ADMIN — SODIUM CHLORIDE 1000 MG: 9 INJECTION INTRAMUSCULAR; INTRAVENOUS; SUBCUTANEOUS at 14:17

## 2023-04-18 RX ADMIN — ENOXAPARIN SODIUM 40 MG: 100 INJECTION SUBCUTANEOUS at 09:44

## 2023-04-18 RX ADMIN — HYDRALAZINE HYDROCHLORIDE 10 MG: 20 INJECTION INTRAMUSCULAR; INTRAVENOUS at 06:34

## 2023-04-18 RX ADMIN — ACETAMINOPHEN 650 MG: 325 TABLET ORAL at 09:44

## 2023-04-18 RX ADMIN — SODIUM CHLORIDE, PRESERVATIVE FREE 10 ML: 5 INJECTION INTRAVENOUS at 09:46

## 2023-04-18 RX ADMIN — SODIUM CHLORIDE, PRESERVATIVE FREE 10 ML: 5 INJECTION INTRAVENOUS at 21:49

## 2023-04-18 ASSESSMENT — PAIN SCALES - GENERAL
PAINLEVEL_OUTOF10: 0
PAINLEVEL_OUTOF10: 2

## 2023-04-18 ASSESSMENT — PAIN DESCRIPTION - LOCATION: LOCATION: KNEE

## 2023-04-18 ASSESSMENT — PAIN DESCRIPTION - DESCRIPTORS: DESCRIPTORS: ACHING

## 2023-04-18 ASSESSMENT — PAIN DESCRIPTION - ORIENTATION: ORIENTATION: RIGHT

## 2023-04-18 NOTE — DISCHARGE INSTR - COC
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    H/O mitral valve repair Z98.890    H/O aortic valve replacement Z95.2    Pure hypercholesterolemia E78.00    Rectal bleed K62.5    GI bleed K92.2    Acute respiratory failure due to COVID-19 ([facility]) U07.1, J96.00    Generalized weakness R53.1    Bladder cancer ([facility]) C67.9    UTI (urinary tract infection) N39.0    Bacteremia R78.81       Isolation/Infection:   Isolation            Contact          Patient Infection Status       Infection Onset Added Last Indicated Last Indicated By Review Planned Expiration Resolved Resolved By    ESBL (Extended Spectrum Beta Lactamase) 23 Culture, Urine        Escherichia coli, blood, 2023  Escherichia coli, urine, 2023    Resolved    COVID-19 (Rule Out) 23 COVID-19, Rapid (Ordered)   23 Rule-Out Test Resulted    COVID-19 (Rule Out) 22 COVID-19 & Influenza Combo (Ordered)   22 Rule-Out Test Resulted    COVID-19 22 COVID-19, Rapid   22 Infection     COVID-19 (Rule Out) 22 COVID-19, Rapid (Ordered)   22 Rule-Out Test Resulted            Nurse Assessment:  Last Vital Signs: BP (!) 171/77   Pulse 60   Temp 98.7 °F (37.1 °C) (Oral)   Resp 16   Ht 6' 2\" (1.88 m)   Wt 187 lb 6.3 oz (85 kg)   SpO2 100%   BMI 24.06 kg/m²     Last documented pain score (0-10 scale): Pain Level: 2  Last Weight:   Wt Readings from Last 1 Encounters:   23 187 lb 6.3 oz (85 kg)     Mental Status:  {IP PT MENTAL STATUS:}    IV Access:  {[facility] GENESIS IV ACCESS:749203343}    Nursing Mobility/ADLs:  Walking   {[facility] DME QIRM:091098760}  Transfer  {[facility] DME DZYT:206209561}  Bathing  {[facility] DME YWBR:321359043}  Dressing  {P

## 2023-04-18 NOTE — CARE COORDINATION
Social Work / Discharge Planning : Yara Castañeda from [facility] accepted and instructed to submit pre-cert. SW did update patient and spouse. N 17 generated, HENS and transport forms completed. AWait pre-cert. Will need COVID. SW to follow. Electronically signed by TRACE Perera on 4/18/23 at 11:36 AM EDT     The Plan for Transition of Care is related to the following treatment goals: [facility]    The Patient and/or patient representative Spouse was provided with a choice of provider and agrees   with the discharge plan. [x] Yes [] No    Freedom of choice list was provided with basic dialogue that supports the patient's individualized plan of care/goals, treatment preferences and shares the quality data associated with the providers.  [x] Yes [] No

## 2023-04-19 PROBLEM — E44.0 MODERATE PROTEIN-CALORIE MALNUTRITION (HCC): Chronic | Status: ACTIVE | Noted: 2023-04-19

## 2023-04-19 LAB
METER GLUCOSE: 107 MG/DL (ref 74–99)
METER GLUCOSE: 113 MG/DL (ref 74–99)
METER GLUCOSE: 140 MG/DL (ref 74–99)
METER GLUCOSE: 141 MG/DL (ref 74–99)
SARS-COV-2 RDRP RESP QL NAA+PROBE: NOT DETECTED

## 2023-04-19 PROCEDURE — 2580000003 HC RX 258: Performed by: STUDENT IN AN ORGANIZED HEALTH CARE EDUCATION/TRAINING PROGRAM

## 2023-04-19 PROCEDURE — 6360000002 HC RX W HCPCS: Performed by: STUDENT IN AN ORGANIZED HEALTH CARE EDUCATION/TRAINING PROGRAM

## 2023-04-19 PROCEDURE — 6360000002 HC RX W HCPCS: Performed by: SPECIALIST

## 2023-04-19 PROCEDURE — 6370000000 HC RX 637 (ALT 250 FOR IP): Performed by: UROLOGY

## 2023-04-19 PROCEDURE — 2580000003 HC RX 258: Performed by: UROLOGY

## 2023-04-19 PROCEDURE — 6360000002 HC RX W HCPCS: Performed by: REGISTERED NURSE

## 2023-04-19 PROCEDURE — 6370000000 HC RX 637 (ALT 250 FOR IP): Performed by: INTERNAL MEDICINE

## 2023-04-19 PROCEDURE — 2580000003 HC RX 258: Performed by: REGISTERED NURSE

## 2023-04-19 PROCEDURE — 87635 SARS-COV-2 COVID-19 AMP PRB: CPT

## 2023-04-19 PROCEDURE — 2580000003 HC RX 258: Performed by: SPECIALIST

## 2023-04-19 PROCEDURE — 2060000000 HC ICU INTERMEDIATE R&B

## 2023-04-19 PROCEDURE — 82962 GLUCOSE BLOOD TEST: CPT

## 2023-04-19 PROCEDURE — A4216 STERILE WATER/SALINE, 10 ML: HCPCS | Performed by: SPECIALIST

## 2023-04-19 RX ADMIN — SODIUM CHLORIDE 1000 MG: 9 INJECTION INTRAMUSCULAR; INTRAVENOUS; SUBCUTANEOUS at 13:45

## 2023-04-19 RX ADMIN — ATORVASTATIN CALCIUM 40 MG: 40 TABLET, FILM COATED ORAL at 21:46

## 2023-04-19 RX ADMIN — LISINOPRIL 10 MG: 10 TABLET ORAL at 08:50

## 2023-04-19 RX ADMIN — SODIUM CHLORIDE, PRESERVATIVE FREE 5 ML: 5 INJECTION INTRAVENOUS at 21:48

## 2023-04-19 RX ADMIN — Medication 300 UNITS: at 08:50

## 2023-04-19 RX ADMIN — Medication 1000 UNITS: at 08:50

## 2023-04-19 RX ADMIN — SODIUM CHLORIDE, PRESERVATIVE FREE 10 ML: 5 INJECTION INTRAVENOUS at 21:46

## 2023-04-19 RX ADMIN — AMLODIPINE BESYLATE 5 MG: 5 TABLET ORAL at 21:45

## 2023-04-19 RX ADMIN — METOCLOPRAMIDE 10 MG: 10 TABLET ORAL at 00:42

## 2023-04-19 RX ADMIN — SODIUM CHLORIDE, PRESERVATIVE FREE 10 ML: 5 INJECTION INTRAVENOUS at 08:51

## 2023-04-19 RX ADMIN — METOPROLOL TARTRATE 25 MG: 25 TABLET, FILM COATED ORAL at 21:46

## 2023-04-19 RX ADMIN — METOCLOPRAMIDE 10 MG: 10 TABLET ORAL at 21:54

## 2023-04-19 RX ADMIN — SODIUM CHLORIDE, PRESERVATIVE FREE 5 ML: 5 INJECTION INTRAVENOUS at 21:47

## 2023-04-19 RX ADMIN — ENOXAPARIN SODIUM 40 MG: 100 INJECTION SUBCUTANEOUS at 08:50

## 2023-04-19 RX ADMIN — Medication 300 UNITS: at 21:46

## 2023-04-19 ASSESSMENT — PAIN SCALES - GENERAL
PAINLEVEL_OUTOF10: 0

## 2023-04-19 NOTE — DISCHARGE SUMMARY
Internal Medicine Discharge Summary    NAME: Ebony Tee :  1940  MRN:  [id number] Jason Bronson MD    ADMITTED: 2023   DISCHARGED: No discharge date for patient encounter. ADMITTING PHYSICIAN: Damien Martell MD    PCP: Raul Busch MD    CONSULTANT(S):   IP CONSULT TO UROLOGY  IP CONSULT TO INTERNAL MEDICINE  IP CONSULT TO INTERNAL MEDICINE  IP CONSULT TO SOCIAL WORK  IP CONSULT TO PALLIATIVE CARE  IP CONSULT TO INTERNAL MEDICINE  IP CONSULT TO INFECTIOUS DISEASES  IP CONSULT TO NEPHROLOGY  IP CONSULT TO PHARMACY  IP CONSULT TO IV TEAM     ADMITTING DIAGNOSIS:   UTI (urinary tract infection) [N39.0]  General weakness [R53.1]  Right leg weakness [R29.898]  Malignant neoplasm of urinary bladder, unspecified site ([facility] [address] [C67.9]  Fever, unspecified fever cause [R50.9]  Bacteremia [R78.81]     Please see H&P for further details    DISCHARGE DIAGNOSES:   Active Hospital Problems    Diagnosis     Generalized weakness [R53.1]      Priority: Medium    Moderate protein-calorie malnutrition ([address] [E44.0]     Bacteremia [R78.81]     UTI (urinary tract infection) [N39.0]     Bladder cancer ([facility] [address] [C67.9]     Aortocoronary bypass status [Z95.1]     Cardiac pacemaker in situ [Z95.0]     H/O aortic valve replacement [Z95.2]     H/O mitral valve repair [Z98.890]     Pure hypercholesterolemia [E78.00]     Aortic stenosis, mild [I35.0]     Mild dilation of ascending aorta ([facility]) [I77.810]     MGUS (monoclonal gammopathy of unknown significance) [D47.2]     Type 2 diabetes mellitus without complication ([facility]) [J40.6]     Hypertension [I10]        BRIEF HISTORY OF PRESENT ILLNESS: Ebony Tee is a 78 y.o. male patient of Raul Busch MD who  has a past medical history of Cancer ([facility] [address] Diabetes mellitus ([address] Erectile dysfunction, Gout, Hypertension, and Vitamin D deficiency.  who originally had concerns including Extremity Weakness (Reports over the past week he hasnt been able to get out

## 2023-04-19 NOTE — CARE COORDINATION
Social Work / Discharge PLanning : MICAH updated by Margaux Marcos at [facility]: Insurance denied. Peer to Peer by 4:30 today. [phone] opt 4. APL:[id number] . Patient insurance stated he can do his IV antibiotics at home. Patient / spouse do not feel they can manage IV on their at home. Meanwhile, SW did speak to [facility] and they can open patient earliest on  Saturday. Patient IV antibiotic is finished on 04/22. Blanca Garvey Await if the denial can be overturned. If peer to peer is NOT overturned, patient can go to the infusion center maybe for the last three dosages. Charge RN updated. SW to follow.  Electronically signed by TRACE Barroso on 4/19/23 at 12:18 PM EDT

## 2023-04-20 VITALS
HEIGHT: 74 IN | RESPIRATION RATE: 16 BRPM | HEART RATE: 60 BPM | WEIGHT: 183.42 LBS | BODY MASS INDEX: 23.54 KG/M2 | TEMPERATURE: 97.4 F | DIASTOLIC BLOOD PRESSURE: 70 MMHG | OXYGEN SATURATION: 100 % | SYSTOLIC BLOOD PRESSURE: 155 MMHG

## 2023-04-20 LAB
ANION GAP SERPL CALCULATED.3IONS-SCNC: 6 MMOL/L (ref 7–16)
BACTERIA BLD CULT: NORMAL
BUN SERPL-MCNC: 20 MG/DL (ref 6–23)
CALCIUM SERPL-MCNC: 9.5 MG/DL (ref 8.6–10.2)
CHLORIDE SERPL-SCNC: 109 MMOL/L (ref 98–107)
CO2 SERPL-SCNC: 26 MMOL/L (ref 22–29)
CREAT SERPL-MCNC: 1 MG/DL (ref 0.7–1.2)
GLUCOSE SERPL-MCNC: 105 MG/DL (ref 74–99)
METER GLUCOSE: 105 MG/DL (ref 74–99)
POTASSIUM SERPL-SCNC: 4.8 MMOL/L (ref 3.5–5)
SODIUM SERPL-SCNC: 141 MMOL/L (ref 132–146)

## 2023-04-20 PROCEDURE — 6360000002 HC RX W HCPCS: Performed by: STUDENT IN AN ORGANIZED HEALTH CARE EDUCATION/TRAINING PROGRAM

## 2023-04-20 PROCEDURE — 6370000000 HC RX 637 (ALT 250 FOR IP): Performed by: INTERNAL MEDICINE

## 2023-04-20 PROCEDURE — 6370000000 HC RX 637 (ALT 250 FOR IP): Performed by: UROLOGY

## 2023-04-20 PROCEDURE — 80048 BASIC METABOLIC PNL TOTAL CA: CPT

## 2023-04-20 PROCEDURE — 82962 GLUCOSE BLOOD TEST: CPT

## 2023-04-20 PROCEDURE — 36415 COLL VENOUS BLD VENIPUNCTURE: CPT

## 2023-04-20 PROCEDURE — 2580000003 HC RX 258: Performed by: STUDENT IN AN ORGANIZED HEALTH CARE EDUCATION/TRAINING PROGRAM

## 2023-04-20 RX ORDER — LISINOPRIL 10 MG/1
10 TABLET ORAL DAILY
Qty: 30 TABLET | Refills: 3 | DISCHARGE
Start: 2023-04-20

## 2023-04-20 RX ORDER — AMLODIPINE BESYLATE 5 MG/1
5 TABLET ORAL DAILY
Qty: 30 TABLET | Refills: 3 | DISCHARGE
Start: 2023-04-20

## 2023-04-20 RX ADMIN — Medication 1000 UNITS: at 08:04

## 2023-04-20 RX ADMIN — SODIUM CHLORIDE, PRESERVATIVE FREE 10 ML: 5 INJECTION INTRAVENOUS at 08:05

## 2023-04-20 RX ADMIN — ENOXAPARIN SODIUM 40 MG: 100 INJECTION SUBCUTANEOUS at 08:04

## 2023-04-20 RX ADMIN — LISINOPRIL 10 MG: 10 TABLET ORAL at 08:04

## 2023-04-20 NOTE — PLAN OF CARE
Problem: Discharge Planning  Goal: Discharge to home or other facility with appropriate resources  4/20/2023 0854 by Madyson Stubbs RN  Outcome: Adequate for Discharge  4/19/2023 2203 by Donaldo Alonso RN  Outcome: Progressing     Problem: Skin/Tissue Integrity  Goal: Absence of new skin breakdown  Description: 1. Monitor for areas of redness and/or skin breakdown  2. Assess vascular access sites hourly  3. Every 4-6 hours minimum:  Change oxygen saturation probe site  4. Every 4-6 hours:  If on nasal continuous positive airway pressure, respiratory therapy assess nares and determine need for appliance change or resting period.   4/20/2023 0854 by Madyson Stubbs RN  Outcome: Adequate for Discharge  4/19/2023 2203 by Donaldo Alonso RN  Outcome: Progressing     Problem: Safety - Adult  Goal: Free from fall injury  4/20/2023 0854 by Madyson Stubbs RN  Outcome: Adequate for Discharge  4/19/2023 2203 by Donaldo Alonso RN  Outcome: Progressing     Problem: Chronic Conditions and Co-morbidities  Goal: Patient's chronic conditions and co-morbidity symptoms are monitored and maintained or improved  4/20/2023 0854 by Madyson Stubbs RN  Outcome: Adequate for Discharge  4/19/2023 2203 by Donaldo Alonso RN  Outcome: Progressing     Problem: ABCDS Injury Assessment  Goal: Absence of physical injury  4/20/2023 0854 by Madyson Stubbs RN  Outcome: Adequate for Discharge  4/19/2023 2203 by Donaldo Alonso RN  Outcome: Progressing     Problem: Pain  Goal: Verbalizes/displays adequate comfort level or baseline comfort level  4/20/2023 0854 by Madyson Stubbs RN  Outcome: Adequate for Discharge  4/19/2023 2203 by Donaldo Alonso RN  Outcome: Progressing     Problem: Nutrition Deficit:  Goal: Optimize nutritional status  4/20/2023 0854 by Madyson Stubbs RN  Outcome: Adequate for Discharge  4/19/2023 2203 by Donaldo Alonso RN  Outcome: Progressing
Problem: Discharge Planning  Goal: Discharge to home or other facility with appropriate resources  Outcome: Progressing     Problem: Skin/Tissue Integrity  Goal: Absence of new skin breakdown  Description: 1. Monitor for areas of redness and/or skin breakdown  2. Assess vascular access sites hourly  3. Every 4-6 hours minimum:  Change oxygen saturation probe site  4. Every 4-6 hours:  If on nasal continuous positive airway pressure, respiratory therapy assess nares and determine need for appliance change or resting period.   Outcome: Progressing     Problem: Safety - Adult  Goal: Free from fall injury  Outcome: Progressing     Problem: Chronic Conditions and Co-morbidities  Goal: Patient's chronic conditions and co-morbidity symptoms are monitored and maintained or improved  Outcome: Progressing     Problem: ABCDS Injury Assessment  Goal: Absence of physical injury  Outcome: Progressing     Problem: Pain  Goal: Verbalizes/displays adequate comfort level or baseline comfort level  Outcome: Progressing
Problem: Discharge Planning  Goal: Discharge to home or other facility with appropriate resources  Outcome: Progressing     Problem: Skin/Tissue Integrity  Goal: Absence of new skin breakdown  Description: 1. Monitor for areas of redness and/or skin breakdown  2. Assess vascular access sites hourly  3. Every 4-6 hours minimum:  Change oxygen saturation probe site  4. Every 4-6 hours:  If on nasal continuous positive airway pressure, respiratory therapy assess nares and determine need for appliance change or resting period. Outcome: Progressing     Problem: Safety - Adult  Goal: Free from fall injury  Outcome: Progressing     Problem: Chronic Conditions and Co-morbidities  Goal: Patient's chronic conditions and co-morbidity symptoms are monitored and maintained or improved  Outcome: Progressing     Problem: ABCDS Injury Assessment  Goal: Absence of physical injury  Outcome: Progressing     Problem: Pain  Goal: Verbalizes/displays adequate comfort level or baseline comfort level  Outcome: Progressing     Problem: Discharge Planning  Goal: Discharge to home or other facility with appropriate resources  Outcome: Progressing     Problem: Skin/Tissue Integrity  Goal: Absence of new skin breakdown  Description: 1. Monitor for areas of redness and/or skin breakdown  2. Assess vascular access sites hourly  3. Every 4-6 hours minimum:  Change oxygen saturation probe site  4. Every 4-6 hours:  If on nasal continuous positive airway pressure, respiratory therapy assess nares and determine need for appliance change or resting period.   Outcome: Progressing     Problem: Safety - Adult  Goal: Free from fall injury  Outcome: Progressing     Problem: Chronic Conditions and Co-morbidities  Goal: Patient's chronic conditions and co-morbidity symptoms are monitored and maintained or improved  Outcome: Progressing     Problem: ABCDS Injury Assessment  Goal: Absence of physical injury  Outcome: Progressing     Problem:
Planning  Goal: Discharge to home or other facility with appropriate resources  4/19/2023 2203 by Di Ribera RN  Outcome: Progressing  4/19/2023 1114 by Kelvin Persaud RN  Outcome: Progressing     Problem: Skin/Tissue Integrity  Goal: Absence of new skin breakdown  Description: 1. Monitor for areas of redness and/or skin breakdown  2. Assess vascular access sites hourly  3. Every 4-6 hours minimum:  Change oxygen saturation probe site  4. Every 4-6 hours:  If on nasal continuous positive airway pressure, respiratory therapy assess nares and determine need for appliance change or resting period.   4/19/2023 2203 by Di Ribera RN  Outcome: Progressing  4/19/2023 1114 by Kelvin Persaud RN  Outcome: Progressing     Problem: Safety - Adult  Goal: Free from fall injury  4/19/2023 2203 by Di Ribera RN  Outcome: Progressing  4/19/2023 1114 by Kevlin Persaud RN  Outcome: Progressing     Problem: Safety - Adult  Goal: Free from fall injury  4/19/2023 2203 by Di Ribera RN  Outcome: Progressing  4/19/2023 1114 by Kelvin Persaud RN  Outcome: Progressing     Problem: Chronic Conditions and Co-morbidities  Goal: Patient's chronic conditions and co-morbidity symptoms are monitored and maintained or improved  4/19/2023 2203 by Di Ribera RN  Outcome: Progressing  4/19/2023 1114 by Kelvin Persaud RN  Outcome: Progressing     Problem: ABCDS Injury Assessment  Goal: Absence of physical injury  4/19/2023 2203 by Di Ribera RN  Outcome: Progressing  4/19/2023 1114 by Kelvin Persaud RN  Outcome: Progressing     Problem: Pain  Goal: Verbalizes/displays adequate comfort level or baseline comfort level  4/19/2023 2203 by Di Ribera RN  Outcome: Progressing  4/19/2023 1114 by Kelvin Persaud RN  Outcome: Progressing     Problem: Nutrition Deficit:  Goal: Optimize nutritional status  Outcome: Progressing

## 2023-04-21 LAB
ALBUMIN SERPL-MCNC: 3.2 G/DL (ref 3.5–5.2)
ALP SERPL-CCNC: 183 U/L (ref 40–129)
ALT SERPL-CCNC: 54 U/L (ref 0–40)
ANION GAP SERPL CALCULATED.3IONS-SCNC: 9 MMOL/L (ref 7–16)
AST SERPL-CCNC: 29 U/L (ref 0–39)
BILIRUB SERPL-MCNC: 0.5 MG/DL (ref 0–1.2)
BUN SERPL-MCNC: 20 MG/DL (ref 6–23)
CALCIUM SERPL-MCNC: 9 MG/DL (ref 8.6–10.2)
CHLORIDE SERPL-SCNC: 107 MMOL/L (ref 98–107)
CHOLESTEROL, TOTAL: 76 MG/DL (ref 0–199)
CO2 SERPL-SCNC: 24 MMOL/L (ref 22–29)
CREAT SERPL-MCNC: 0.9 MG/DL (ref 0.7–1.2)
ERYTHROCYTE [DISTWIDTH] IN BLOOD BY AUTOMATED COUNT: 14.4 FL (ref 11.5–15)
GLUCOSE SERPL-MCNC: 89 MG/DL (ref 74–99)
HBA1C MFR BLD: 5.7 % (ref 4–5.6)
HCT VFR BLD AUTO: 29.6 % (ref 37–54)
HDLC SERPL-MCNC: 30 MG/DL
HGB BLD-MCNC: 9.5 G/DL (ref 12.5–16.5)
LDLC SERPL CALC-MCNC: 31 MG/DL (ref 0–99)
MCH RBC QN AUTO: 31.5 PG (ref 26–35)
MCHC RBC AUTO-ENTMCNC: 32.1 % (ref 32–34.5)
MCV RBC AUTO: 98 FL (ref 80–99.9)
PLATELET # BLD AUTO: 259 E9/L (ref 130–450)
PMV BLD AUTO: 10.3 FL (ref 7–12)
POTASSIUM SERPL-SCNC: 5.1 MMOL/L (ref 3.5–5)
PROT SERPL-MCNC: 6.7 G/DL (ref 6.4–8.3)
RBC # BLD AUTO: 3.02 E12/L (ref 3.8–5.8)
SODIUM SERPL-SCNC: 140 MMOL/L (ref 132–146)
TRIGL SERPL-MCNC: 77 MG/DL (ref 0–149)
VITAMIN D 25-HYDROXY: 31 NG/ML (ref 30–100)
VLDLC SERPL CALC-MCNC: 15 MG/DL
WBC # BLD: 10.3 E9/L (ref 4.5–11.5)

## 2023-04-21 PROCEDURE — 99305 1ST NF CARE MODERATE MDM 35: CPT | Performed by: FAMILY MEDICINE

## 2023-04-24 ENCOUNTER — HOSPITAL ENCOUNTER (OUTPATIENT)
Dept: INFUSION THERAPY | Age: 83
Setting detail: INFUSION SERIES
Discharge: HOME | End: 2023-04-24

## 2023-04-24 LAB
ALBUMIN SERPL-MCNC: 3.4 G/DL (ref 3.5–5.2)
ALP SERPL-CCNC: 187 U/L (ref 40–129)
ALT SERPL-CCNC: 31 U/L (ref 0–40)
ANION GAP SERPL CALCULATED.3IONS-SCNC: 9 MMOL/L (ref 7–16)
AST SERPL-CCNC: 20 U/L (ref 0–39)
BILIRUB SERPL-MCNC: 0.3 MG/DL (ref 0–1.2)
BUN SERPL-MCNC: 21 MG/DL (ref 6–23)
CALCIUM SERPL-MCNC: 9 MG/DL (ref 8.6–10.2)
CHLORIDE SERPL-SCNC: 108 MMOL/L (ref 98–107)
CO2 SERPL-SCNC: 24 MMOL/L (ref 22–29)
CREAT SERPL-MCNC: 0.9 MG/DL (ref 0.7–1.2)
CRP SERPL HS-MCNC: 0.6 MG/DL (ref 0–0.4)
ERYTHROCYTE [DISTWIDTH] IN BLOOD BY AUTOMATED COUNT: 14.2 FL (ref 11.5–15)
ERYTHROCYTE [SEDIMENTATION RATE] IN BLOOD BY WESTERGREN METHOD: 40 MM/HR (ref 0–15)
GLUCOSE SERPL-MCNC: 97 MG/DL (ref 74–99)
HCT VFR BLD AUTO: 29.8 % (ref 37–54)
HGB BLD-MCNC: 9.5 G/DL (ref 12.5–16.5)
MCH RBC QN AUTO: 31 PG (ref 26–35)
MCHC RBC AUTO-ENTMCNC: 31.9 % (ref 32–34.5)
MCV RBC AUTO: 97.4 FL (ref 80–99.9)
PLATELET # BLD AUTO: 257 E9/L (ref 130–450)
PMV BLD AUTO: 10 FL (ref 7–12)
POTASSIUM SERPL-SCNC: 5 MMOL/L (ref 3.5–5)
PROT SERPL-MCNC: 6.8 G/DL (ref 6.4–8.3)
RBC # BLD AUTO: 3.06 E12/L (ref 3.8–5.8)
SODIUM SERPL-SCNC: 141 MMOL/L (ref 132–146)
WBC # BLD: 7.6 E9/L (ref 4.5–11.5)

## 2023-04-26 ENCOUNTER — OUTSIDE SERVICES (OUTPATIENT)
Dept: PRIMARY CARE CLINIC | Age: 83
End: 2023-04-26
Payer: MEDICARE

## 2023-04-26 DIAGNOSIS — N39.0 COMPLICATED UTI (URINARY TRACT INFECTION): Primary | ICD-10-CM

## 2023-04-26 DIAGNOSIS — R53.1 GENERALIZED WEAKNESS: ICD-10-CM

## 2023-04-26 DIAGNOSIS — E11.9 TYPE 2 DIABETES MELLITUS WITHOUT COMPLICATION, UNSPECIFIED WHETHER LONG TERM INSULIN USE (HCC): ICD-10-CM

## 2023-04-27 LAB
ALBUMIN SERPL-MCNC: 3.7 G/DL (ref 3.5–5.2)
ALP SERPL-CCNC: 179 U/L (ref 40–129)
ALT SERPL-CCNC: 22 U/L (ref 0–40)
ANION GAP SERPL CALCULATED.3IONS-SCNC: 8 MMOL/L (ref 7–16)
AST SERPL-CCNC: 18 U/L (ref 0–39)
BASOPHILS # BLD: 0.07 E9/L (ref 0–0.2)
BASOPHILS NFR BLD: 1 % (ref 0–2)
BILIRUB SERPL-MCNC: 0.4 MG/DL (ref 0–1.2)
BUN SERPL-MCNC: 27 MG/DL (ref 6–23)
CALCIUM SERPL-MCNC: 9.4 MG/DL (ref 8.6–10.2)
CHLORIDE SERPL-SCNC: 108 MMOL/L (ref 98–107)
CO2 SERPL-SCNC: 26 MMOL/L (ref 22–29)
CREAT SERPL-MCNC: 1.1 MG/DL (ref 0.7–1.2)
EOSINOPHIL # BLD: 0.07 E9/L (ref 0.05–0.5)
EOSINOPHIL NFR BLD: 1 % (ref 0–6)
ERYTHROCYTE [DISTWIDTH] IN BLOOD BY AUTOMATED COUNT: 14 FL (ref 11.5–15)
GLUCOSE SERPL-MCNC: 81 MG/DL (ref 74–99)
HCT VFR BLD AUTO: 30.4 % (ref 37–54)
HGB BLD-MCNC: 9.9 G/DL (ref 12.5–16.5)
IMM GRANULOCYTES # BLD: 0.02 E9/L
IMM GRANULOCYTES NFR BLD: 0.3 % (ref 0–5)
LYMPHOCYTES # BLD: 1.6 E9/L (ref 1.5–4)
LYMPHOCYTES NFR BLD: 23.6 % (ref 20–42)
MCH RBC QN AUTO: 31.3 PG (ref 26–35)
MCHC RBC AUTO-ENTMCNC: 32.6 % (ref 32–34.5)
MCV RBC AUTO: 96.2 FL (ref 80–99.9)
MONOCYTES # BLD: 0.57 E9/L (ref 0.1–0.95)
MONOCYTES NFR BLD: 8.4 % (ref 2–12)
NEUTROPHILS # BLD: 4.45 E9/L (ref 1.8–7.3)
NEUTS SEG NFR BLD: 65.7 % (ref 43–80)
PLATELET # BLD AUTO: 252 E9/L (ref 130–450)
PMV BLD AUTO: 10.2 FL (ref 7–12)
POTASSIUM SERPL-SCNC: 5.1 MMOL/L (ref 3.5–5)
PROT SERPL-MCNC: 7 G/DL (ref 6.4–8.3)
RBC # BLD AUTO: 3.16 E12/L (ref 3.8–5.8)
SODIUM SERPL-SCNC: 142 MMOL/L (ref 132–146)
WBC # BLD: 6.8 E9/L (ref 4.5–11.5)

## 2023-05-01 ENCOUNTER — HOSPITAL ENCOUNTER (OUTPATIENT)
Dept: INFUSION THERAPY | Age: 83
Setting detail: INFUSION SERIES
Discharge: HOME | End: 2023-05-01

## 2023-05-12 PROBLEM — N39.0 UTI (URINARY TRACT INFECTION): Status: RESOLVED | Noted: 2023-04-12 | Resolved: 2023-05-12

## 2023-05-22 LAB
ADDENDUM ELECTROPHORESIS URINE RANDOM: NORMAL
IMMUNOFIXATION URINE: NORMAL

## 2023-05-22 NOTE — PROGRESS NOTES
[address] [place] [address] [facility]  [facility]  Progress Note    SUBJECTIVE:  Chief Complaint   Patient presents with    Extremity Weakness     Reports over the past week he hasnt been able to get out of his recliner and feeling weak. Reports that they inserted some medicated seeds into his bladder a couple weeks ago. Patient ambulated from the bathroom to the chair  In no distress. He is feeling well. No fevers  Tolerating antibiotics - no diarrhea. Review of systems:  As stated above in the chief complaint, otherwise negative.     Medications:  Scheduled Meds:   lisinopril  10 mg Oral Daily    sodium chloride flush  5-40 mL IntraVENous 2 times per day    heparin flush  3 mL IntraVENous 2 times per day    amLODIPine  5 mg Oral Nightly    sodium chloride flush  5-40 mL IntraVENous 2 times per day    ertapenem (INVanz) IVPB  1,000 mg IntraVENous Q24H    atorvastatin  40 mg Oral Nightly    [Held by provider] furosemide  20 mg Oral Daily    [Held by provider] metFORMIN  500 mg Oral BID WC    metoprolol tartrate  25 mg Oral Daily    Vitamin D  1,000 Units Oral QAM    sodium chloride flush  10 mL IntraVENous 2 times per day    enoxaparin  40 mg SubCUTAneous Daily    insulin lispro  0-8 Units SubCUTAneous TID WC    insulin lispro  0-4 Units SubCUTAneous Nightly     Continuous Infusions:   sodium chloride      sodium chloride      sodium chloride      dextrose       PRN Meds:metoclopramide, sodium chloride flush, sodium chloride, heparin flush, calcium carbonate, simethicone, sodium chloride flush, sodium chloride, sodium chloride flush, sodium chloride, ondansetron **OR** ondansetron, senna, acetaminophen **OR** acetaminophen, hydrALAZINE, melatonin, glucose, dextrose bolus **OR** dextrose bolus, glucagon (rDNA), dextrose    OBJECTIVE:  BP (!) 156/70   Pulse 61   Temp 98 °F (36.7 °C) (Oral)   Resp 16   Ht 6' 2\" (1.88 m)   Wt 185 lb 3 oz (84 kg)   SpO2 99%   BMI 23.78 kg/m²   Temp  Av.1 °F (36.7
4/19/2023 8:51 AM  Service: Urology  Group: [facility] ([facility]ocio Pena)    Marsha Capps  [id number]    Chief urologic compliant: UTI  HPI:  Patient is doing ok  His family is present   Overall is feeling better   He does have some weakness     Review of Systems:  Respiratory: negative for cough and hemoptysis  Cardiovascular: negative for chest pain and dyspnea  Gastrointestinal: negative for abdominal pain, diarrhea, nausea and vomiting  Derm: negative for rash and skin lesion(s)  Neurological: negative for seizures and tremors  Endocrine: negative for diabetic symptoms including polydipsia and polyuria  : As above in the HPI, otherwise negative  All other reviews are negative     Allergies: Patient has no known allergies. Objective:   Vitals:    04/19/23 0754   BP: (!) 156/70   Pulse: 61   Resp: 16   Temp: 98 °F (36.7 °C)   SpO2:        Neuro: A/A/O x3  Respiratory: non labored breathing  ABD: soft non-tender, non-distended  : haines no  Ext: no clubbing, cyanosis, edema    Labs:   No results for input(s): WBC, RBC, HGB, HCT, MCV, MCH, MCHC, RDW, PLT, MPV in the last 72 hours.     Recent Labs     04/17/23  0215   CREATININE 0.9       Assessment: Marsha Capps 78 y.o. male     PCA s/p prostate brachytherapy in 2019  Bladder cancer POST TURBT in 2023 Juliana Cummings) (Ta) (prognosis is good)  UTI with bacteremia (2/6 treatments of BCG)    Plan:    Cont watch the PVR  Cont the antibiotics per ID  Will need outpatient eval   His wife and son are present   Pathology was reviewed   Planning to go to rehab in the next 24-48 hours     Judene Mcburney, DO [facility]  Urology
[address] [place] [address] [facility]  [facility]  Progress Note    SUBJECTIVE:  Chief Complaint   Patient presents with    Extremity Weakness     Reports over the past week he hasnt been able to get out of his recliner and feeling weak. Reports that they inserted some medicated seeds into his bladder a couple weeks ago. Patient up in chair getting dressed for discharge. Family present. In no distress. He is feeling well. No fevers  Tolerating antibiotics - no diarrhea. Review of systems:  As stated above in the chief complaint, otherwise negative.     Medications:  Scheduled Meds:   lisinopril  10 mg Oral Daily    sodium chloride flush  5-40 mL IntraVENous 2 times per day    heparin flush  3 mL IntraVENous 2 times per day    amLODIPine  5 mg Oral Nightly    sodium chloride flush  5-40 mL IntraVENous 2 times per day    ertapenem (INVanz) IVPB  1,000 mg IntraVENous Q24H    atorvastatin  40 mg Oral Nightly    [Held by provider] furosemide  20 mg Oral Daily    [Held by provider] metFORMIN  500 mg Oral BID WC    metoprolol tartrate  25 mg Oral Daily    Vitamin D  1,000 Units Oral QAM    sodium chloride flush  10 mL IntraVENous 2 times per day    enoxaparin  40 mg SubCUTAneous Daily    insulin lispro  0-8 Units SubCUTAneous TID WC    insulin lispro  0-4 Units SubCUTAneous Nightly     Continuous Infusions:   sodium chloride      sodium chloride      sodium chloride      dextrose       PRN Meds:metoclopramide, sodium chloride flush, sodium chloride, heparin flush, calcium carbonate, simethicone, sodium chloride flush, sodium chloride, sodium chloride flush, sodium chloride, ondansetron **OR** ondansetron, senna, acetaminophen **OR** acetaminophen, hydrALAZINE, melatonin, glucose, dextrose bolus **OR** dextrose bolus, glucagon (rDNA), dextrose    OBJECTIVE:  BP (!) 155/70   Pulse 60   Temp 97.4 °F (36.3 °C) (Oral)   Resp 16   Ht 6' 2\" (1.88 m)   Wt 183 lb 6.8 oz (83.2 kg)   SpO2 100%   BMI 23.55 kg/m²
Arranged pt for transport to [facility] at 1900. Message sent to Dr Marley Fields to obtain d/c orders. See new orders. Call placed to [facility] urology to notify of the above.
[facility] MD Santa Ruiz, MD Deepika Gross, MD Pancho Selby, TITA Aldridge, HANG  Progress note  Patient's Name: Ricardo Desouza  10:45 PM  4/19/2023 4/14 Feels better  Bp stable  Azotemia better  On RA    4/15 on RA euvolemic chest clear in good spirit     4/16 sitting in chair , comfortable on RA having lunch     4/17 seen in his room euvolemic NAD     4/19:  Seen while laying in bed. NAD. Euvolemic. No changes. History of Present Ilness:         Shankar Cardoso is an 78year old gentleman that presented to the ED on 04/12/23 with a complaint of fatigue and extreme weakness. He reports feeling this way over the past week. He also complained of fever, chills and difficulty with urination. He has a PMH that includes:  bladder cancer, diabetes mellitus, hypertension, gout, erectile dysfunction and pacemaker placement. He denies chest pain , shortness of breath, dizziness, headache, nausea, vomiting, abd pain, numbness, tingling or abdominal pain. He repors that he had seeds implanted int his bladder a couple weeks ago. He was alert and oriented and full ROM of extremities. Labs revealed a Co2-20, Bun -44, creatinine-1.6mg/dl, platelets of 89. UA was positive for UTI and hematuria. CT abd/pelvis showed no calculi or obstructive uropathy and no acute pathology. Chest X-ray revealed possible early pulmonary congestion. He was admitted for further treatment and evaluation. His baseline creatinine was found to be 0.9 mg/dl in November 2022.         Past Medical History:   Diagnosis Date    Cancer ([address] 2023    bladder    Diabetes mellitus ([facility] [address] [facility]     states glucose is running normal    Erectile dysfunction     Gout     Hypertension     states doing well    Vitamin D deficiency        Past Surgical History:   Procedure Laterality Date    APPENDECTOMY  01/01/1967    CARDIAC SURGERY  06/08/2020    CATARACT REMOVAL WITH IMPLANT Left 06/06/2013    CATARACT
Comprehensive Nutrition Assessment    Type and Reason for Visit:  Initial, RD Nutrition Re-Screen/LOS    Nutrition Recommendations/Plan:   Continue Diet. Will Start ONS and monitor. Malnutrition Assessment:  Malnutrition Status: Moderate malnutrition (04/19/23 1139)    Context:  Chronic Illness     Findings of the 6 clinical characteristics of malnutrition:  Energy Intake:  75% or less estimated energy requirements for 1 month or longer  Weight Loss:  10% over 6 months (~15% x ~9 months)     Body Fat Loss:  Mild body fat loss Orbital   Muscle Mass Loss:  Mild muscle mass loss Temples (temporalis), Clavicles (pectoralis & deltoids)  Fluid Accumulation:  No significant fluid accumulation     Strength:  Not Performed    Nutrition Assessment:    Pt adm w/ weakness/fatigue x ~1wk pta and +fever/chills x ~1d pta. PMHx DM, VHD, Bladder CA (dx 3/2022); Adm w/ complicated UTI, sepsis, bacteremia, KELLI. BSE WNL 4/13. Pt at nutritional risk d/t currently meeting criteria for Moderate Malnutrition w/ noted wasting, poor intake and significant wt loss 2/2 Bladder CA. Will Start ONS and monitor. Nutrition Related Findings:    A&O, dentition WNL, Abd/BS WDL, no edema, -I/O's Wound Type: None       Current Nutrition Intake & Therapies:    Average Meal Intake: %  Average Supplements Intake: None Ordered  ADULT DIET; Regular; 4 carb choices (60 gm/meal)    Anthropometric Measures:  Height: 6' 2\" (188 cm)  Ideal Body Weight (IBW): 190 lbs (86 kg)    Admission Body Weight: 185 lb (83.9 kg) (bed 4/19)  Current Body Weight: 185 lb (83.9 kg) (bed 4/19),   IBW.  Weight Source: Bed Scale  Current BMI (kg/m2): 23.7  Usual Body Weight: 218 lb (98.9 kg) (bed 7/30/22 per EMR; ~15% loss x ~9 months)  % Weight Change (Calculated): -15.1  Weight Adjustment For: No Adjustment                 BMI Categories: Normal Weight (BMI 22.0 to 24.9) age over 72    Estimated Daily Nutrient Needs:  Energy Requirements Based On:
PVR 101ml
Per Dr Nikolas Schroeder pt is not to be d/c'd tonight and we are to set him up to be d/c in the AM instead. Will notify PAS of the above.      Spoke with PAS, transport arranged for 4/20 at 0900
Physical Therapy    Treatment Note     Name: Shabana Calles  : 1940  MRN: [id number]      Date of Service: 2023    Evaluating PT: Dereck Holstein, PT, DPT [id number]      Room #:  8440/0356-M  Diagnosis:  UTI (urinary tract infection) [N39.0]  General weakness [R53.1]  Right leg weakness [R29.898]  Malignant neoplasm of urinary bladder, unspecified site ([facility] [address] [C67.9]  Fever, unspecified fever cause [R50.9]  Bacteremia [R78.81]  PMHx/PSHx:   has a past medical history of Cancer ([facility] [address] Diabetes mellitus ([facility] [address] Erectile dysfunction, Gout, Hypertension, and Vitamin D deficiency. Precautions:  Fall risk, Bed/chair alarm    SUBJECTIVE:    Pt lives with wife in a single story house with 2 stair(s) and 2 rail(s) to enter. Pt ambulated without AD prior to admission. Pt recently started using standard walker due to weakness. OBJECTIVE:   Initial Evaluation  Date: 23 Treatment Date:  2023 Short Term/ Long Term   Goals   AM-PAC 6 Clicks     Was pt agreeable to Eval/treatment? Yes yes    Does pt have pain? Chronic R hip pain with mobility R knee pain, states stiff, pain from a prior fall    Bed Mobility  Rolling: NT  Supine to sit: SBA  Sit to supine: NT  Scooting: SBA to EOB Sit to supine: Min A LE support Rolling: Independent   Supine to sit: Independent   Sit to supine: Independent   Scooting: Independent    Transfers Sit to stand: SBA  Stand to sit: SBA  Stand pivot: SBA with [employer] Sit to stand; SBA with much effort from a low chair  Stand to sit: SBA Sit to stand: Independent   Stand to sit: Independent   Stand pivot: Mod Independent with [employer]   Ambulation   100 feet x2 with WW with SBA 80 feet using [employer] for support SBA for balance >400 feet with [employer] Mod Independent    Stair negotiation: ascended and descended NT NT.  Pt in isolation 2 step(s) with 2 rail(s) with Supervision   ROM BUE: Refer to OT note  BLE: WFL     Strength BUE: Refer to OT note  BLE: WFL     Balance Sitting EOB:
Physician Progress Note      PATIENT:               Tania Barron  CSN #:                  [id number]  :                       1940  ADMIT DATE:       2023 3:57 PM  [address] [place] DATE:        2023 10:00 AM  RESPONDING  PROVIDER #:        Kendell Salter CNP          QUERY TEXT:    Pt admitted with UTI. Noted documentation of sepsis on  by ordered ID   consultant. If possible, please document in progress notes and discharge   summary:    The medical record reflects the following:  Risk Factors: UTI  Clinical Indicators: T 102.8, Procal 7.82, CRP 20.3, sed 47, and per ID   consult \". Allie Lemus ? Complicated UTI with sepsis. ESBL + E. coli bacteremia associated   to complicated UTI. Allie Lemus \"  Treatment: IV fluids, IV Ertapenem, IV Cefepime    Thank you,  Vandana LEDBETTER, RN, CDIS  Clinical Documentation Integrity  [email or web address]  Options provided:  -- Sepsis confirmed present on admission  -- Sepsis confirmed not present on admission  -- Sepsis ruled out  -- Other - I will add my own diagnosis  -- Disagree - Not applicable / Not valid  -- Disagree - Clinically unable to determine / Unknown  -- Refer to Clinical Documentation Reviewer    PROVIDER RESPONSE TEXT:    The diagnosis of sepsis was confirmed not present on admission.     Query created by: Preston Alvarez on 2023 12:01 PM      Electronically signed by:  Kendell Salter CNP 2023 10:11 AM
RN spoke with patient and wife about radioactive seeds, confirmed implanted in 2019. Also stated in Dr Nica Tate note.
Rn reached out to Dr Tamir Birmingham for discharge orders, spoke with answering service.  Made them aware patient is set up to leave at 9am.
SPEECH LANGUAGE PATHOLOGY  DAILY PROGRESS NOTE        PATIENT NAME:  Zain Hearn      :  1940          TODAY'S DATE:  2023 ROOM:  [address] [place] [address]        SWALLOWING    Pt seen at bedside for dysphagia management. RN reports good toleration of current diet. Pt on room air. Oral trials completed with good toleration. DYSPHAGIA DIAGNOSIS:   Clinical indicators of functional swallow for age/premorbid functioning               No signs of aspiration were noted during this evaluation however, silent aspiration cannot be ruled out at bedside. If silent aspiration is suspected, a Videofluoroscopic Study of Swallowing (MBS) is recommended and requires a physician order. DIET RECOMMENDATIONS:  Regular consistency solids (IDDSI level 7) with  thin liquids (IDDSI level 0)                FEEDING RECOMMENDATIONS:                         Assistance level:  Stand by assistance is needed during all oral intake due to current mentation                           Compensatory strategies recommended: Small bites/sips         Oral- adequate labial seal and A-P transfer, no oral residue      Pharyngeal- No overt s/s of aspiration, no multiple swallows      Education- Pt educated on results and POC. Pt trained on compensatory strategies for safe swallow with good outcome. Questions answered. Plan  D/C due to goals met. Reconsult as warranted. Seymour PATINO CCC/SLP [id number]  Speech Pathologist              CPT code(s) 02620  dysphagia tx  Total minutes :  15 minutes
GLUCOSE 107*   CALCIUM 9.2       Lab Results   Component Value Date/Time    HGB 10.1 04/15/2023 03:20 AM    HCT 30.3 04/15/2023 03:20 AM       Lab Results   Component Value Date    PSA 0.07 11/17/2022    PSA 15.4 (H) 05/31/2019         Assessment/Plan:  PCA s/p prostate brachytherapy in 2019  Bladder cancer   UTI with bacteremia      Continue the antibiotics per ID, appreciate their input   Continue off oxybutynin   PVR 120ml  Hold on haines   Monitor PVRs   Planning on [facility]  Will follow     General Rowena, APRN - CNP   [facility]      Agree with above  Seen and examined  Agree with the plan and treatment    mobicanvas, DO
Ht 6' 2\" (1.88 m)   Wt 197 lb 8.5 oz (89.6 kg)   SpO2 98%   BMI 25.36 kg/m²   Temp  Av.8 °F (36.6 °C)  Min: 97 °F (36.1 °C)  Max: 98.1 °F (36.7 °C)  Constitutional: The patient is awake, alert, and oriented. Lying in bed, wife is visiting. Skin: Warm and dry. No rashes were noted. HEENT: Round and reactive pupils. Moist mucous membranes. No ulcerations or thrush. Neck: Supple to movements. Chest: No respiratory distress. Symmetrical expansion. No wheezing, crackles or rhonchi. Cardiovascular: heart sounds are rhythmic and regular. No murmurs appreciated. Abdomen: Positive bowel sounds to auscultation. Benign to palpation. No masses felt. Extremities: No edema.   Lines: PICC line  42 cm right arm     Laboratory and Tests Review:  Lab Results   Component Value Date    WBC 8.3 04/15/2023    WBC 7.5 2023    WBC 8.4 2023    HGB 10.1 (L) 04/15/2023    HCT 30.3 (L) 04/15/2023    MCV 94.1 04/15/2023     (L) 04/15/2023     Lab Results   Component Value Date    NEUTROABS 6.30 04/15/2023    NEUTROABS 6.83 2023    NEUTROABS 7.56 (H) 2023     No results found for: [facility]  Lab Results   Component Value Date    ALT 35 04/15/2023    AST 44 (H) 04/15/2023    ALKPHOS 140 (H) 04/15/2023    BILITOT 0.6 04/15/2023     Lab Results   Component Value Date/Time     2023 02:15 AM    K 4.8 2023 02:15 AM    K 4.2 04/15/2023 03:20 AM     2023 02:15 AM    CO2 25 2023 02:15 AM    BUN 24 2023 02:15 AM    CREATININE 0.9 2023 02:15 AM    CREATININE 1.3 04/15/2023 03:20 AM    CREATININE 1.5 2023 03:35 AM    GFRAA >60 2022 03:31 AM    LABGLOM >60 2023 02:15 AM    GLUCOSE 107 2023 02:15 AM    PROT 6.9 04/15/2023 03:20 AM    LABALBU 3.4 04/15/2023 03:20 AM    CALCIUM 9.2 2023 02:15 AM    BILITOT 0.6 04/15/2023 03:20 AM    ALKPHOS 140 04/15/2023 03:20 AM    AST 44 04/15/2023 03:20 AM    ALT 35 04/15/2023 03:20 AM     Lab
Min: 97.5 °F (36.4 °C)  Max: 97.9 °F (36.6 °C)  Constitutional: The patient is resting comfortably in bed. In no distress. Wife present. Skin: Warm and dry. No rashes were noted. HEENT: Round and reactive pupils. Moist mucous membranes. No ulcerations or thrush. Neck: Supple to movements. Chest: No respiratory distress. Symmetrical expansion. No wheezing, crackles or rhonchi. Cardiovascular: heart sounds are rhythmic and regular. No murmurs appreciated. Abdomen: Positive bowel sounds to auscultation. Benign to palpation. No masses felt. Extremities: No edema.   Lines: PICC line 4/16 42 cm right arm     Laboratory and Tests Review:  Lab Results   Component Value Date    WBC 8.3 04/15/2023    WBC 7.5 04/14/2023    WBC 8.4 04/13/2023    HGB 10.1 (L) 04/15/2023    HCT 30.3 (L) 04/15/2023    MCV 94.1 04/15/2023     (L) 04/15/2023     Lab Results   Component Value Date    NEUTROABS 6.30 04/15/2023    NEUTROABS 6.83 04/14/2023    NEUTROABS 7.56 (H) 04/13/2023     No results found for: [facility]  Lab Results   Component Value Date    ALT 35 04/15/2023    AST 44 (H) 04/15/2023    ALKPHOS 140 (H) 04/15/2023    BILITOT 0.6 04/15/2023     Lab Results   Component Value Date/Time     04/17/2023 02:15 AM    K 4.8 04/17/2023 02:15 AM    K 4.2 04/15/2023 03:20 AM     04/17/2023 02:15 AM    CO2 25 04/17/2023 02:15 AM    BUN 24 04/17/2023 02:15 AM    CREATININE 0.9 04/17/2023 02:15 AM    CREATININE 1.3 04/15/2023 03:20 AM    CREATININE 1.5 04/14/2023 03:35 AM    GFRAA >60 07/30/2022 03:31 AM    LABGLOM >60 04/17/2023 02:15 AM    GLUCOSE 107 04/17/2023 02:15 AM    PROT 6.9 04/15/2023 03:20 AM    LABALBU 3.4 04/15/2023 03:20 AM    CALCIUM 9.2 04/17/2023 02:15 AM    BILITOT 0.6 04/15/2023 03:20 AM    ALKPHOS 140 04/15/2023 03:20 AM    AST 44 04/15/2023 03:20 AM    ALT 35 04/15/2023 03:20 AM     Lab Results   Component Value Date    CRP 20.7 (H) 04/14/2023    CRP 20.3 (H) 04/13/2023    CRP 11.1 (H) 07/30/2022
calcium carbonate (TUMS) chewable tablet 500 mg  500 mg Oral TID PRN Vahe Madera MD        [facility]) chewable tablet 80 mg  80 mg Oral Q6H PRN Vahe Madera MD   80 mg at 04/17/23 1015    sodium chloride flush 0.9 % injection 5-40 mL  5-40 mL IntraVENous 2 times per day Genna Tejeda MD   10 mL at 04/15/23 2015    sodium chloride flush 0.9 % injection 5-40 mL  5-40 mL IntraVENous PRN Veronica Sawyer MD        0.9 % sodium chloride infusion   IntraVENous PRN Genna Tejeda MD        ertapenem Lutz Haste) 1,000 mg in sodium chloride (PF) 0.9 % 10 mL IV syringe  1,000 mg IntraVENous Q24H Jose Amado MD   1,000 mg at 04/17/23 1439    atorvastatin (LIPITOR) tablet 40 mg  40 mg Oral Nightly Genna Tejeda MD   40 mg at 04/17/23 2106    [Held by provider] furosemide (LASIX) tablet 20 mg  20 mg Oral Daily Veronica Sawyer MD   20 mg at 04/16/23 0841    [Held by provider] metFORMIN (GLUCOPHAGE) tablet 500 mg  500 mg Oral BID  Aston Sawyer MD        metoprolol tartrate (LOPRESSOR) tablet 25 mg  25 mg Oral Daily Veronica Sawyer MD   25 mg at 04/17/23 2105    vitamin D (CHOLECALCIFEROL) tablet 1,000 Units  1,000 Units Oral QAM Aston Sawyer MD   1,000 Units at 04/17/23 1015    sodium chloride flush 0.9 % injection 10 mL  10 mL IntraVENous 2 times per day Vahe Madera MD   10 mL at 04/17/23 2108    sodium chloride flush 0.9 % injection 10 mL  10 mL IntraVENous PRN Vahe Madera MD   30 mL at 04/16/23 1656    0.9 % sodium chloride infusion   IntraVENous PRN Vahe Madera MD        enoxaparin (LOVENOX) injection 40 mg  40 mg SubCUTAneous Daily Vahe Madera MD   40 mg at 04/17/23 1015    ondansetron (ZOFRAN-ODT) disintegrating tablet 4 mg  4 mg Oral Q8H PRN Vahe Madera MD        Or    ondansetron [facility]) injection 4 mg  4 mg IntraVENous Q6H PRN Vahe Madera MD        senna (SENOKOT) tablet 8.6 mg  1 tablet Oral Daily PRN Vahe Madera MD        acetaminophen (TYLENOL) tablet 650 mg  650 mg Oral
tablet 80 mg  80 mg Oral Q6H PRN Heaven Muñoz MD        sodium chloride flush 0.9 % injection 5-40 mL  5-40 mL IntraVENous 2 times per day Emeterio Sigala MD   10 mL at 04/15/23 2015    sodium chloride flush 0.9 % injection 5-40 mL  5-40 mL IntraVENous PRN Aston Sawyer MD        0.9 % sodium chloride infusion   IntraVENous PRN Emeterio Sigala MD        ertapenem Xenia Ravi) 1,000 mg in sodium chloride (PF) 0.9 % 10 mL IV syringe  1,000 mg IntraVENous Q24H Shahab Beavers MD   1,000 mg at 04/16/23 1253    atorvastatin (LIPITOR) tablet 40 mg  40 mg Oral Nightly Bernice Sawyer MD   40 mg at 04/16/23 2026    [Held by provider] furosemide (LASIX) tablet 20 mg  20 mg Oral Daily Bernice Sawyer MD   20 mg at 04/16/23 0841    [Held by provider] lisinopril (PRINIVIL;ZESTRIL) tablet 40 mg  40 mg Oral QAM Bernice Sawyer MD        [Held by provider] metFORMIN (GLUCOPHAGE) tablet 500 mg  500 mg Oral BID WC Aston Sawyer MD        metoprolol tartrate (LOPRESSOR) tablet 25 mg  25 mg Oral Daily Bernice Sawyer MD   25 mg at 04/16/23 2026    vitamin D (CHOLECALCIFEROL) tablet 1,000 Units  1,000 Units Oral QAM Aston Sawyer MD   1,000 Units at 04/16/23 0840    sodium chloride flush 0.9 % injection 10 mL  10 mL IntraVENous 2 times per day Heavne Muñoz MD   10 mL at 04/16/23 2027    sodium chloride flush 0.9 % injection 10 mL  10 mL IntraVENous PRN Heaven Muñoz MD   30 mL at 04/16/23 1656    0.9 % sodium chloride infusion   IntraVENous PRN Heaven Muñoz MD        enoxaparin (LOVENOX) injection 40 mg  40 mg SubCUTAneous Daily Heaven Muñoz MD   40 mg at 04/16/23 0840    ondansetron (ZOFRAN-ODT) disintegrating tablet 4 mg  4 mg Oral Q8H PRN Heaven Muñoz MD        Or    ondansetron [facility]) injection 4 mg  4 mg IntraVENous Q6H PRN Heaven Muñoz MD        senna (SENOKOT) tablet 8.6 mg  1 tablet Oral Daily PRN Heaven Muñoz MD        acetaminophen (TYLENOL) tablet 650 mg  650 mg Oral Q6H PRN Heaven Muñoz MD   650 mg at
Sensitive <=^0.5 mcg/mL BACTERIAL SUSCEPTIBILITY PANEL BY RADHA     nitrofurantoin Sensitive <=^16 mcg/mL BACTERIAL SUSCEPTIBILITY PANEL BY RADHA     tigecycline Sensitive <=^0.5 mcg/mL BACTERIAL SUSCEPTIBILITY PANEL BY RADHA     tobramycin Resistant >=^16 mcg/mL BACTERIAL SUSCEPTIBILITY PANEL BY RADHA     trimethoprim-sulfamethoxazole Resistant >=^320 mcg/mL BACTERIAL SUSCEPTIBILITY PANEL BY RADHA                    Assessment/Plan:  PCA (brachytherapy 2019) bladder cancer/ UTI (E Coli)    Antibiotics per ID   Creatinine stable   Last recorded PVR was 161 (elevated but does not need haines catheter)  Urine cytology on 3/7/2023 is positive for malignant cells  He is considering short term rehab placement at [facility] upon discharge as he lives at home with his wife, and she may not be able to care for him immediately due to his limited mobility. He will also likely be discharged on IV antibiotics. Plan possibly tomorrow.             Luca Meade, APRN - CNP   [facility]  Urology
cooperative. Completed ADL at seated and standing levels. Limited stand tolerance for ADL activity. Min cues for safety. Pt returned to sit in the chair at the end of the session. Education/treatment:  ADL retraining with facilitation of movement to increase self care skills. Therapeutic activity to address balance and endurance for ADL and transfers. Pt education of walker safety and transfer safety. Pt has made  progress towards set goals.        Time In: 8:00   Time Out: 8:30      Min Units   Therapeutic Ex 46696     Therapeutic Activities 99666 10 1   ADL/Self Care 18621 20 1   Orthotic Management 09002     Neuro Re-Ed 76384     Non-Billable Time     TOTAL TIMED TREATMENT 30 [facility] DIANE/L 44462
No rales, rhonchi, or wheezing. Heart:   Regular rate and rhythm without murmur, gallop or rub. Abdomen:  Soft, non-tender, normal bowel sounds. No bruits, organomegaly or masses. Extremities: Extremities warm to touch, pink, with no edema. Musculoskeletal:  No joint swelling, deformity, or tenderness. Peripheral Pulses:  Normal  Neurologic:  Gait normal. Reflexes normal and symmetric. Sensation grossly intact.         Data:   Labs:  CBC with Differential:    Lab Results   Component Value Date/Time    WBC 8.3 04/15/2023 03:20 AM    RBC 3.22 04/15/2023 03:20 AM    HGB 10.1 04/15/2023 03:20 AM    HCT 30.3 04/15/2023 03:20 AM     04/15/2023 03:20 AM    MCV 94.1 04/15/2023 03:20 AM    MCH 31.4 04/15/2023 03:20 AM    MCHC 33.3 04/15/2023 03:20 AM    RDW 14.6 04/15/2023 03:20 AM    NRBC 0.0 04/14/2023 03:35 AM    METASPCT 1.7 07/29/2022 05:43 AM    LYMPHOPCT 11.4 04/15/2023 03:20 AM    MONOPCT 11.0 04/15/2023 03:20 AM    BASOPCT 0.2 04/15/2023 03:20 AM    MONOSABS 0.91 04/15/2023 03:20 AM    LYMPHSABS 0.94 04/15/2023 03:20 AM    EOSABS 0.06 04/15/2023 03:20 AM    BASOSABS 0.02 04/15/2023 03:20 AM     CMP:    Lab Results   Component Value Date/Time     04/17/2023 02:15 AM    K 4.8 04/17/2023 02:15 AM    K 4.2 04/15/2023 03:20 AM     04/17/2023 02:15 AM    CO2 25 04/17/2023 02:15 AM    BUN 24 04/17/2023 02:15 AM    CREATININE 0.9 04/17/2023 02:15 AM    GFRAA >60 07/30/2022 03:31 AM    LABGLOM >60 04/17/2023 02:15 AM    GLUCOSE 107 04/17/2023 02:15 AM    PROT 6.9 04/15/2023 03:20 AM    LABALBU 3.4 04/15/2023 03:20 AM    CALCIUM 9.2 04/17/2023 02:15 AM    BILITOT 0.6 04/15/2023 03:20 AM    ALKPHOS 140 04/15/2023 03:20 AM    AST 44 04/15/2023 03:20 AM    ALT 35 04/15/2023 03:20 AM     Ionized Calcium:  No results found for: IONCA  Magnesium:    Lab Results   Component Value Date/Time    MG 2.0 04/12/2023 04:49 PM     Phosphorus:    Lab Results   Component Value Date/Time    PHOS 3.5 04/14/2023 03:35 AM
Results   Component Value Date/Time    COLORU Yellow 04/12/2023 06:44 PM    PHUR 5.0 04/12/2023 06:44 PM    WBCUA >20 04/12/2023 06:44 PM    RBCUA >20 04/12/2023 06:44 PM    BACTERIA MANY 04/12/2023 06:44 PM    CLARITYU CLOUDY 04/12/2023 06:44 PM    SPECGRAV 1.025 04/12/2023 06:44 PM    LEUKOCYTESUR LARGE 04/12/2023 06:44 PM    UROBILINOGEN 0.2 04/12/2023 06:44 PM    BILIRUBINUR Negative 04/12/2023 06:44 PM    BLOODU LARGE 04/12/2023 06:44 PM    GLUCOSEU Negative 04/12/2023 06:44 PM    AMORPHOUS MODERATE 04/12/2023 06:44 PM     Microalbumen/Creatinine ratio:  No components found for: RUCREAT  Iron Saturation:  No components found for: PERCENTFE  TIBC:    Lab Results   Component Value Date/Time    TIBC 197 11/19/2022 05:27 AM     FERRITIN:    Lab Results   Component Value Date/Time    FERRITIN 585 11/19/2022 05:27 AM        Imaging:  XR CHEST PORTABLE   Final Result   Adjustment of the right PICC line which is now in good position as described. Mild CHF         XR CHEST PORTABLE   Final Result   Malpositioned right PICC line with the tip likely in the right internal   jugular vein. The line has already been repositioned and follow-up imaging   has been obtained which is dictated separately. US RETROPERITONEAL COMPLETE   Final Result   1. The urinary bladder shows diffuse thickening of its wall together with   dependent debris or hemorrhage. 2.  No hydronephrosis or hydroureter. Age-appropriate renal cortical   thinning. Bilateral renal cysts. CT HEAD WO CONTRAST   Final Result   No acute intracranial abnormality. No significant interval change with cortical atrophy and chronic   periventricular microangiopathy. CT CERVICAL SPINE WO CONTRAST   Final Result   No acute abnormality of the cervical spine. CT ABDOMEN PELVIS WO CONTRAST   Final Result      1. No evidence of urinary tract calculi or obstructive uropathy.       2.  Diverticulosis without evidence of definite
Suspect early pulmonary vascular congestion. 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]     Cardiac pacemaker in situ [Z95.0]     H/O aortic valve replacement [Z95.2]     H/O mitral valve repair [Z98.890]     Pure hypercholesterolemia [E78.00]     Aortic stenosis, mild [I35.0]     Mild dilation of ascending aorta (HCC) [I77.810]     MGUS (monoclonal gammopathy of unknown significance) [D47.2]     Type 2 diabetes mellitus without complication ([facility] [Q16.3]     Hypertension [I10]          Plan  Complicated UTI  Urology admitted the patient   Prior ucx history reviewed  Started Cefepime 4/13 and stopped   Ertapenem started per ID 4/13   Blood cultures obtained   Procal 7.82  CRP 20.3  Will need PICC line as no oral options appreciate ID recs     ESBL E Coli bacteremia   As above     KELLI  Baseline Cr 0.9  Admission Cr 1.6  Nephrology consult fluid balance as per them    Valvular heart disease   BNP 7,930 on admission significantly higher than previous   Last ECHO was 2019 with moderate AS at that time   History of AVR  Follows with [facility] had AVR 3 years ago     Thrombocytopenia   Monitor the PLT count -  improving. Daily CBC     History of bladder cancer   Palliative care consultation  Undergoing BCG treatments with urology     Hiccups  Tried Reglan without improvement. ID ordered baclofen without resolution. CT abdomen without any identifiable cause on 4/12/23.      Code Status Full  Discharge plan: Per urology, ok from my standpoint     Electronically signed by Pola Cortez MD on 4/19/2023 at 9:48 AM    I can be reached through [address] [place] [address]

## 2023-10-18 NOTE — ANESTHESIA POSTPROCEDURE EVALUATION
Department of Anesthesiology  Postprocedure Note    Patient: Mumtaz Fink  MRN: [id number]  YOB: 1940  Date of evaluation: 3/7/2023      Procedure Summary     Date: 03/07/23 Room / Location: [facility] OR 11 / [facility]    Anesthesia Start: 6430 Anesthesia Stop: 8094    Procedures:       [address] (Bladder)      BLADDER BIOPSY Jean-Claude Mccoy (Bladder) Diagnosis:       Prostate cancer ([facility] [address]      ([place])    Surgeons: Reyes Swanson MD Responsible Provider: Pierre Mak MD    Anesthesia Type: MAC ASA Status: 3          Anesthesia Type: No value filed.     Amy Phase I: Amy Score: 10    Amy Phase II: Amy Score: 10      Anesthesia Post Evaluation    Patient location during evaluation: PACU  Patient participation: complete - patient participated  Level of consciousness: awake and alert  Pain score: 2  Airway patency: patent  Nausea & Vomiting: no vomiting and no nausea  Complications: no  Cardiovascular status: hemodynamically stable  Respiratory status: spontaneous ventilation  Hydration status: stable no

## 2025-05-12 ENCOUNTER — TRANSCRIBE ORDERS (OUTPATIENT)
Dept: ADMINISTRATIVE | Age: 85
End: 2025-05-12

## 2025-05-12 DIAGNOSIS — C67.4 MALIGNANT NEOPLASM OF POSTERIOR WALL OF URINARY BLADDER (HCC): Primary | ICD-10-CM

## 2025-06-11 ENCOUNTER — HOSPITAL ENCOUNTER (OUTPATIENT)
Dept: CT IMAGING | Age: 85
Discharge: HOME | End: 2025-06-13
Attending: UROLOGY
Payer: MEDICARE

## 2025-06-11 DIAGNOSIS — C67.4 MALIGNANT NEOPLASM OF POSTERIOR WALL OF URINARY BLADDER (HCC): ICD-10-CM

## 2025-06-11 PROCEDURE — 74176 CT ABD & PELVIS W/O CONTRAST: CPT

## 2025-06-21 ENCOUNTER — HOSPITAL ENCOUNTER (EMERGENCY)
Age: 85
Discharge: HOME | End: 2025-06-21
Attending: EMERGENCY MEDICINE
Payer: MEDICARE

## 2025-06-21 VITALS
TEMPERATURE: 98 F | DIASTOLIC BLOOD PRESSURE: 71 MMHG | SYSTOLIC BLOOD PRESSURE: 152 MMHG | HEIGHT: 75 IN | BODY MASS INDEX: 23 KG/M2 | RESPIRATION RATE: 17 BRPM | WEIGHT: 185 LBS | OXYGEN SATURATION: 99 % | HEART RATE: 80 BPM

## 2025-06-21 DIAGNOSIS — R33.8 ACUTE URINARY RETENTION: Primary | ICD-10-CM

## 2025-06-21 DIAGNOSIS — R31.9 URINARY TRACT INFECTION WITH HEMATURIA, SITE UNSPECIFIED: ICD-10-CM

## 2025-06-21 DIAGNOSIS — N39.0 URINARY TRACT INFECTION WITH HEMATURIA, SITE UNSPECIFIED: ICD-10-CM

## 2025-06-21 LAB
ALBUMIN SERPL-MCNC: 4.8 G/DL (ref 3.5–5.2)
ALP SERPL-CCNC: 127 U/L (ref 40–129)
ALT SERPL-CCNC: 16 U/L (ref 0–40)
ANION GAP SERPL CALCULATED.3IONS-SCNC: 16 MMOL/L (ref 7–16)
AST SERPL-CCNC: 19 U/L (ref 0–39)
BACTERIA URNS QL MICRO: ABNORMAL
BASOPHILS # BLD: 0.04 K/UL (ref 0–0.2)
BASOPHILS NFR BLD: 0 % (ref 0–2)
BILIRUB SERPL-MCNC: 0.8 MG/DL (ref 0–1.2)
BILIRUB UR QL STRIP: ABNORMAL
BUN SERPL-MCNC: 44 MG/DL (ref 6–23)
CALCIUM SERPL-MCNC: 9.7 MG/DL (ref 8.6–10.2)
CHLORIDE SERPL-SCNC: 101 MMOL/L (ref 98–107)
CLARITY UR: ABNORMAL
CO2 SERPL-SCNC: 18 MMOL/L (ref 22–29)
COLOR UR: ABNORMAL
CREAT SERPL-MCNC: 1.4 MG/DL (ref 0.7–1.2)
EOSINOPHIL # BLD: 0.01 K/UL (ref 0.05–0.5)
EOSINOPHILS RELATIVE PERCENT: 0 % (ref 0–6)
ERYTHROCYTE [DISTWIDTH] IN BLOOD BY AUTOMATED COUNT: 14.2 % (ref 11.5–15)
GFR, ESTIMATED: 50 ML/MIN/1.73M2
GLUCOSE SERPL-MCNC: 162 MG/DL (ref 74–99)
GLUCOSE UR STRIP-MCNC: NEGATIVE MG/DL
HCT VFR BLD AUTO: 33.5 % (ref 37–54)
HGB BLD-MCNC: 10.9 G/DL (ref 12.5–16.5)
HGB UR QL STRIP.AUTO: ABNORMAL
IMM GRANULOCYTES # BLD AUTO: <0.03 K/UL (ref 0–0.58)
IMM GRANULOCYTES NFR BLD: 0 % (ref 0–5)
KETONES UR STRIP-MCNC: ABNORMAL MG/DL
LEUKOCYTE ESTERASE UR QL STRIP: ABNORMAL
LYMPHOCYTES NFR BLD: 0.95 K/UL (ref 1.5–4)
LYMPHOCYTES RELATIVE PERCENT: 10 % (ref 20–42)
MCH RBC QN AUTO: 33 PG (ref 26–35)
MCHC RBC AUTO-ENTMCNC: 32.5 G/DL (ref 32–34.5)
MCV RBC AUTO: 101.5 FL (ref 80–99.9)
MONOCYTES NFR BLD: 0.42 K/UL (ref 0.1–0.95)
MONOCYTES NFR BLD: 5 % (ref 2–12)
NEUTROPHILS NFR BLD: 84 % (ref 43–80)
NEUTS SEG NFR BLD: 7.73 K/UL (ref 1.8–7.3)
NITRITE UR QL STRIP: NEGATIVE
PH UR STRIP: 5.5 [PH] (ref 5–8)
PLATELET # BLD AUTO: 143 K/UL (ref 130–450)
PMV BLD AUTO: 11.2 FL (ref 7–12)
POTASSIUM SERPL-SCNC: 5.2 MMOL/L (ref 3.5–5)
PROT SERPL-MCNC: 8 G/DL (ref 6.4–8.3)
PROT UR STRIP-MCNC: 100 MG/DL
RBC # BLD AUTO: 3.3 M/UL (ref 3.8–5.8)
RBC #/AREA URNS HPF: ABNORMAL /HPF
SODIUM SERPL-SCNC: 135 MMOL/L (ref 132–146)
SP GR UR STRIP: 1.01 (ref 1–1.03)
UROBILINOGEN UR STRIP-ACNC: 1 EU/DL (ref 0–1)
WBC #/AREA URNS HPF: ABNORMAL /HPF
WBC OTHER # BLD: 9.2 K/UL (ref 4.5–11.5)

## 2025-06-21 PROCEDURE — 80053 COMPREHEN METABOLIC PANEL: CPT

## 2025-06-21 PROCEDURE — 51702 INSERT TEMP BLADDER CATH: CPT

## 2025-06-21 PROCEDURE — 6370000000 HC RX 637 (ALT 250 FOR IP)

## 2025-06-21 PROCEDURE — 85025 COMPLETE CBC W/AUTO DIFF WBC: CPT

## 2025-06-21 PROCEDURE — 51798 US URINE CAPACITY MEASURE: CPT

## 2025-06-21 PROCEDURE — 87086 URINE CULTURE/COLONY COUNT: CPT

## 2025-06-21 PROCEDURE — 99283 EMERGENCY DEPT VISIT LOW MDM: CPT

## 2025-06-21 PROCEDURE — 87077 CULTURE AEROBIC IDENTIFY: CPT

## 2025-06-21 PROCEDURE — 81001 URINALYSIS AUTO W/SCOPE: CPT

## 2025-06-21 RX ORDER — CEFDINIR 300 MG/1
300 CAPSULE ORAL 2 TIMES DAILY
Qty: 14 CAPSULE | Refills: 0 | Status: SHIPPED | OUTPATIENT
Start: 2025-06-21 | End: 2025-06-23 | Stop reason: ALTCHOICE

## 2025-06-21 RX ORDER — CEFDINIR 300 MG/1
300 CAPSULE ORAL ONCE
Status: COMPLETED | OUTPATIENT
Start: 2025-06-21 | End: 2025-06-21

## 2025-06-21 RX ADMIN — CEFDINIR 300 MG: 300 CAPSULE ORAL at 11:01

## 2025-06-21 ASSESSMENT — LIFESTYLE VARIABLES
HOW MANY STANDARD DRINKS CONTAINING ALCOHOL DO YOU HAVE ON A TYPICAL DAY: 1 OR 2
HOW OFTEN DO YOU HAVE A DRINK CONTAINING ALCOHOL: MONTHLY OR LESS

## 2025-06-21 ASSESSMENT — PAIN DESCRIPTION - ORIENTATION: ORIENTATION: LOWER

## 2025-06-21 ASSESSMENT — PAIN DESCRIPTION - DESCRIPTORS: DESCRIPTORS: PRESSURE

## 2025-06-21 ASSESSMENT — PAIN DESCRIPTION - LOCATION: LOCATION: ABDOMEN

## 2025-06-21 ASSESSMENT — PAIN - FUNCTIONAL ASSESSMENT: PAIN_FUNCTIONAL_ASSESSMENT: ACTIVITIES ARE NOT PREVENTED

## 2025-06-21 ASSESSMENT — PAIN SCALES - GENERAL: PAINLEVEL_OUTOF10: 5

## 2025-06-21 NOTE — ED PROVIDER NOTES
[facility] EMERGENCY DEPARTMENT  EMERGENCY DEPARTMENT ENCOUNTER        Pt Name: Israel Delcid  MRN: [id number]  Birthdate 1940  Date of evaluation: 6/21/2025  Provider: Maritza Pitts DO  PCP: Suzy Espinoza MD  Note Started: 8:12 AM EDT 6/21/25    CHIEF COMPLAINT       Chief Complaint   Patient presents with    Urinary Retention     Unable to urinate since last night, had a scope of his bladder done yesterday        HISTORY OF PRESENT ILLNESS: 1 or more Elements   Israel Delcid is a 85 y.o. male with a past medical history of bladder cancer and erectile dysfunction who presents to the emergency department with chief complaint of urinary retention.  Patient had a cystoscopy performed yesterday by Dr. Costa Aldana for evaluation of cancer and beginning last night he was unable to urinate.  He does endorse some abdominal discomfort which has been progressively worsening over the past 12 hours.  Patient otherwise denies any nausea or vomiting, fevers, chills,    Nursing Notes were all reviewed and agreed with or any disagreements were addressed in the HPI.    REVIEW OF SYSTEMS :    Positives and Pertinent negatives as per HPI.    PAST MEDICAL HISTORY/Chronic Conditions Affecting Care    has a past medical history of Cancer (HCC) (2023), Diabetes mellitus (HCC), Erectile dysfunction, Gout, Hypertension, and Vitamin D deficiency.     SURGICAL HISTORY     Past Surgical History:   Procedure Laterality Date    APPENDECTOMY  01/01/1967    CARDIAC SURGERY  06/08/2020    CATARACT REMOVAL WITH IMPLANT Left 06/06/2013    CATARACT REMOVAL WITH IMPLANT Right 10/31/2013    CYSTOSCOPY N/A 3/7/2023    CYSTOSCOPY RETROGRADE PYELOGRAM performed by Costa Pena MD at [facility] OR    CYSTOSCOPY N/A 3/7/2023    BLADDER BIOPSY FULGARATION performed by Costa Pena MD at [facility] OR    HERNIA REPAIR  01/01/1970    [facility]    PACEMAKER PLACEMENT      PICC INSERTION VASCULAR ACCESS TEAM

## 2025-06-21 NOTE — DISCHARGE INSTRUCTIONS
Please take your antibiotic twice a day for the next 7 days for your urinary tract infection.  Also call your urologist on Monday for a follow-up appointment due to the urinary retention.  Return to the emergency department if the Parisi catheter does not appear to be draining or if you develop a fever or confusion.

## 2025-06-22 ENCOUNTER — RESULTS FOLLOW-UP (OUTPATIENT)
Dept: EMERGENCY DEPT | Age: 85
End: 2025-06-22

## 2025-06-22 LAB
MICROORGANISM SPEC CULT: ABNORMAL
SERVICE CMNT-IMP: ABNORMAL
SPECIMEN DESCRIPTION: ABNORMAL

## 2025-06-23 LAB
MICROORGANISM SPEC CULT: ABNORMAL
SERVICE CMNT-IMP: ABNORMAL
SPECIMEN DESCRIPTION: ABNORMAL

## 2025-06-23 RX ORDER — AMOXICILLIN 500 MG/1
500 CAPSULE ORAL EVERY 8 HOURS
Qty: 21 CAPSULE | Refills: 0 | Status: SHIPPED | OUTPATIENT
Start: 2025-06-23 | End: 2025-06-30

## 2025-07-27 ENCOUNTER — HOSPITAL ENCOUNTER (INPATIENT)
Age: 85
LOS: 2 days | Discharge: NURSING, REHAB OR LONG-TERM CARE | DRG: 690 | End: 2025-07-30
Attending: STUDENT IN AN ORGANIZED HEALTH CARE EDUCATION/TRAINING PROGRAM | Admitting: INTERNAL MEDICINE
Payer: MEDICARE

## 2025-07-27 ENCOUNTER — APPOINTMENT (OUTPATIENT)
Dept: GENERAL RADIOLOGY | Age: 85
DRG: 690 | End: 2025-07-27
Payer: MEDICARE

## 2025-07-27 ENCOUNTER — APPOINTMENT (OUTPATIENT)
Dept: CT IMAGING | Age: 85
DRG: 690 | End: 2025-07-27
Payer: MEDICARE

## 2025-07-27 DIAGNOSIS — N18.9 CHRONIC KIDNEY DISEASE, UNSPECIFIED CKD STAGE: ICD-10-CM

## 2025-07-27 DIAGNOSIS — R62.7 FAILURE TO THRIVE IN ADULT: Primary | ICD-10-CM

## 2025-07-27 DIAGNOSIS — N30.01 ACUTE CYSTITIS WITH HEMATURIA: ICD-10-CM

## 2025-07-27 DIAGNOSIS — R29.6 FREQUENT FALLS: ICD-10-CM

## 2025-07-27 DIAGNOSIS — Z86.19 HISTORY OF ESBL E. COLI INFECTION: ICD-10-CM

## 2025-07-27 LAB
ALBUMIN SERPL-MCNC: 3.4 G/DL (ref 3.5–5.2)
ALP SERPL-CCNC: 146 U/L (ref 40–129)
ALT SERPL-CCNC: 18 U/L (ref 0–50)
ANION GAP SERPL CALCULATED.3IONS-SCNC: 13 MMOL/L (ref 7–16)
AST SERPL-CCNC: 18 U/L (ref 0–50)
BASOPHILS # BLD: 0.02 K/UL (ref 0–0.2)
BASOPHILS NFR BLD: 0 % (ref 0–2)
BILIRUB SERPL-MCNC: 0.6 MG/DL (ref 0–1.2)
BUN SERPL-MCNC: 48 MG/DL (ref 8–23)
CALCIUM SERPL-MCNC: 9 MG/DL (ref 8.8–10.2)
CHLORIDE SERPL-SCNC: 103 MMOL/L (ref 98–107)
CO2 SERPL-SCNC: 18 MMOL/L (ref 22–29)
CREAT SERPL-MCNC: 1.4 MG/DL (ref 0.7–1.2)
EOSINOPHIL # BLD: 0 K/UL (ref 0.05–0.5)
EOSINOPHILS RELATIVE PERCENT: 0 % (ref 0–6)
ERYTHROCYTE [DISTWIDTH] IN BLOOD BY AUTOMATED COUNT: 14.3 % (ref 11.5–15)
GFR, ESTIMATED: 49 ML/MIN/1.73M2
GLUCOSE SERPL-MCNC: 192 MG/DL (ref 74–99)
HCT VFR BLD AUTO: 26.2 % (ref 37–54)
HGB BLD-MCNC: 9.2 G/DL (ref 12.5–16.5)
IMM GRANULOCYTES # BLD AUTO: 0.1 K/UL (ref 0–0.58)
IMM GRANULOCYTES NFR BLD: 1 % (ref 0–5)
LYMPHOCYTES NFR BLD: 0.68 K/UL (ref 1.5–4)
LYMPHOCYTES RELATIVE PERCENT: 5 % (ref 20–42)
MCH RBC QN AUTO: 33.2 PG (ref 26–35)
MCHC RBC AUTO-ENTMCNC: 35.1 G/DL (ref 32–34.5)
MCV RBC AUTO: 94.6 FL (ref 80–99.9)
MONOCYTES NFR BLD: 0.96 K/UL (ref 0.1–0.95)
MONOCYTES NFR BLD: 7 % (ref 2–12)
NEUTROPHILS NFR BLD: 87 % (ref 43–80)
NEUTS SEG NFR BLD: 11.87 K/UL (ref 1.8–7.3)
PLATELET # BLD AUTO: 121 K/UL (ref 130–450)
PMV BLD AUTO: 10.7 FL (ref 7–12)
POTASSIUM SERPL-SCNC: 5.1 MMOL/L (ref 3.5–5.1)
PROT SERPL-MCNC: 6.9 G/DL (ref 6.4–8.3)
RBC # BLD AUTO: 2.77 M/UL (ref 3.8–5.8)
SODIUM SERPL-SCNC: 134 MMOL/L (ref 136–145)
TROPONIN I SERPL HS-MCNC: 44 NG/L (ref 0–22)
WBC OTHER # BLD: 13.6 K/UL (ref 4.5–11.5)

## 2025-07-27 PROCEDURE — 73030 X-RAY EXAM OF SHOULDER: CPT

## 2025-07-27 PROCEDURE — 85025 COMPLETE CBC W/AUTO DIFF WBC: CPT

## 2025-07-27 PROCEDURE — 93005 ELECTROCARDIOGRAM TRACING: CPT

## 2025-07-27 PROCEDURE — 84484 ASSAY OF TROPONIN QUANT: CPT

## 2025-07-27 PROCEDURE — 99285 EMERGENCY DEPT VISIT HI MDM: CPT

## 2025-07-27 PROCEDURE — 71045 X-RAY EXAM CHEST 1 VIEW: CPT

## 2025-07-27 PROCEDURE — 80053 COMPREHEN METABOLIC PANEL: CPT

## 2025-07-27 RX ORDER — IOPAMIDOL 755 MG/ML
75 INJECTION, SOLUTION INTRAVASCULAR
Status: COMPLETED | OUTPATIENT
Start: 2025-07-27 | End: 2025-07-28

## 2025-07-27 RX ORDER — 0.9 % SODIUM CHLORIDE 0.9 %
1000 INTRAVENOUS SOLUTION INTRAVENOUS ONCE
Status: COMPLETED | OUTPATIENT
Start: 2025-07-28 | End: 2025-07-28

## 2025-07-27 ASSESSMENT — PAIN - FUNCTIONAL ASSESSMENT: PAIN_FUNCTIONAL_ASSESSMENT: NONE - DENIES PAIN

## 2025-07-28 ENCOUNTER — APPOINTMENT (OUTPATIENT)
Dept: CT IMAGING | Age: 85
DRG: 690 | End: 2025-07-28
Payer: MEDICARE

## 2025-07-28 PROBLEM — R62.7 FAILURE TO THRIVE IN ADULT: Status: ACTIVE | Noted: 2025-07-28

## 2025-07-28 LAB
ALBUMIN SERPL-MCNC: 3.2 G/DL (ref 3.5–5.2)
ALP SERPL-CCNC: 141 U/L (ref 40–129)
ALT SERPL-CCNC: 15 U/L (ref 0–50)
ANION GAP SERPL CALCULATED.3IONS-SCNC: 10 MMOL/L (ref 7–16)
AST SERPL-CCNC: 22 U/L (ref 0–50)
BACTERIA URNS QL MICRO: ABNORMAL
BILIRUB SERPL-MCNC: 0.8 MG/DL (ref 0–1.2)
BILIRUB UR QL STRIP: NEGATIVE
BUN SERPL-MCNC: 41 MG/DL (ref 8–23)
CALCIUM SERPL-MCNC: 8.7 MG/DL (ref 8.8–10.2)
CHLORIDE SERPL-SCNC: 105 MMOL/L (ref 98–107)
CLARITY UR: CLEAR
CO2 SERPL-SCNC: 18 MMOL/L (ref 22–29)
COLOR UR: YELLOW
CREAT SERPL-MCNC: 1.2 MG/DL (ref 0.7–1.2)
EKG ATRIAL RATE: 312 BPM
EKG Q-T INTERVAL: 446 MS
EKG QRS DURATION: 164 MS
EKG QTC CALCULATION (BAZETT): 460 MS
EKG R AXIS: -85 DEGREES
EKG T AXIS: 84 DEGREES
EKG VENTRICULAR RATE: 64 BPM
GFR, ESTIMATED: 61 ML/MIN/1.73M2
GLUCOSE BLD-MCNC: 128 MG/DL (ref 74–99)
GLUCOSE BLD-MCNC: 148 MG/DL (ref 74–99)
GLUCOSE BLD-MCNC: 160 MG/DL (ref 74–99)
GLUCOSE BLD-MCNC: 171 MG/DL (ref 74–99)
GLUCOSE SERPL-MCNC: 136 MG/DL (ref 74–99)
GLUCOSE UR STRIP-MCNC: NEGATIVE MG/DL
HGB UR QL STRIP.AUTO: ABNORMAL
KETONES UR STRIP-MCNC: NEGATIVE MG/DL
LEUKOCYTE ESTERASE UR QL STRIP: ABNORMAL
NITRITE UR QL STRIP: NEGATIVE
PH UR STRIP: 6 [PH] (ref 5–8)
POTASSIUM SERPL-SCNC: 4.7 MMOL/L (ref 3.5–5.1)
PROT SERPL-MCNC: 6.6 G/DL (ref 6.4–8.3)
PROT UR STRIP-MCNC: NEGATIVE MG/DL
RBC #/AREA URNS HPF: ABNORMAL /HPF
SODIUM SERPL-SCNC: 133 MMOL/L (ref 136–145)
SP GR UR STRIP: 1.01 (ref 1–1.03)
TROPONIN I SERPL HS-MCNC: 44 NG/L (ref 0–22)
UROBILINOGEN UR STRIP-ACNC: 1 EU/DL (ref 0–1)
WBC #/AREA URNS HPF: ABNORMAL /HPF

## 2025-07-28 PROCEDURE — 84484 ASSAY OF TROPONIN QUANT: CPT

## 2025-07-28 PROCEDURE — 87086 URINE CULTURE/COLONY COUNT: CPT

## 2025-07-28 PROCEDURE — 93010 ELECTROCARDIOGRAM REPORT: CPT | Performed by: INTERNAL MEDICINE

## 2025-07-28 PROCEDURE — 6370000000 HC RX 637 (ALT 250 FOR IP): Performed by: NURSE PRACTITIONER

## 2025-07-28 PROCEDURE — 74177 CT ABD & PELVIS W/CONTRAST: CPT

## 2025-07-28 PROCEDURE — 2580000003 HC RX 258

## 2025-07-28 PROCEDURE — 6360000004 HC RX CONTRAST MEDICATION: Performed by: RADIOLOGY

## 2025-07-28 PROCEDURE — 2580000003 HC RX 258: Performed by: STUDENT IN AN ORGANIZED HEALTH CARE EDUCATION/TRAINING PROGRAM

## 2025-07-28 PROCEDURE — 6360000002 HC RX W HCPCS: Performed by: STUDENT IN AN ORGANIZED HEALTH CARE EDUCATION/TRAINING PROGRAM

## 2025-07-28 PROCEDURE — 1200000000 HC SEMI PRIVATE

## 2025-07-28 PROCEDURE — 82962 GLUCOSE BLOOD TEST: CPT

## 2025-07-28 PROCEDURE — 80053 COMPREHEN METABOLIC PANEL: CPT

## 2025-07-28 PROCEDURE — 6360000002 HC RX W HCPCS: Performed by: NURSE PRACTITIONER

## 2025-07-28 PROCEDURE — 81001 URINALYSIS AUTO W/SCOPE: CPT

## 2025-07-28 PROCEDURE — 2500000003 HC RX 250 WO HCPCS: Performed by: NURSE PRACTITIONER

## 2025-07-28 PROCEDURE — 87077 CULTURE AEROBIC IDENTIFY: CPT

## 2025-07-28 PROCEDURE — 72125 CT NECK SPINE W/O DYE: CPT

## 2025-07-28 PROCEDURE — 70450 CT HEAD/BRAIN W/O DYE: CPT

## 2025-07-28 RX ORDER — SENNOSIDES 8.6 MG/1
1 TABLET ORAL DAILY PRN
Status: DISCONTINUED | OUTPATIENT
Start: 2025-07-28 | End: 2025-07-30 | Stop reason: HOSPADM

## 2025-07-28 RX ORDER — POTASSIUM CHLORIDE 1500 MG/1
40 TABLET, EXTENDED RELEASE ORAL PRN
Status: DISCONTINUED | OUTPATIENT
Start: 2025-07-28 | End: 2025-07-30 | Stop reason: HOSPADM

## 2025-07-28 RX ORDER — ONDANSETRON 4 MG/1
4 TABLET, ORALLY DISINTEGRATING ORAL EVERY 8 HOURS PRN
Status: DISCONTINUED | OUTPATIENT
Start: 2025-07-28 | End: 2025-07-30 | Stop reason: HOSPADM

## 2025-07-28 RX ORDER — ACETAMINOPHEN 650 MG/1
650 SUPPOSITORY RECTAL EVERY 6 HOURS PRN
Status: DISCONTINUED | OUTPATIENT
Start: 2025-07-28 | End: 2025-07-30 | Stop reason: HOSPADM

## 2025-07-28 RX ORDER — MAGNESIUM SULFATE IN WATER 40 MG/ML
2000 INJECTION, SOLUTION INTRAVENOUS PRN
Status: DISCONTINUED | OUTPATIENT
Start: 2025-07-28 | End: 2025-07-30 | Stop reason: HOSPADM

## 2025-07-28 RX ORDER — FUROSEMIDE 20 MG/1
20 TABLET ORAL DAILY
Status: DISCONTINUED | OUTPATIENT
Start: 2025-07-28 | End: 2025-07-29

## 2025-07-28 RX ORDER — ALLOPURINOL 100 MG/1
100 TABLET ORAL EVERY MORNING
COMMUNITY

## 2025-07-28 RX ORDER — ACETAMINOPHEN 325 MG/1
650 TABLET ORAL EVERY 6 HOURS PRN
Status: DISCONTINUED | OUTPATIENT
Start: 2025-07-28 | End: 2025-07-30 | Stop reason: HOSPADM

## 2025-07-28 RX ORDER — DEXTROSE MONOHYDRATE 100 MG/ML
INJECTION, SOLUTION INTRAVENOUS CONTINUOUS PRN
Status: DISCONTINUED | OUTPATIENT
Start: 2025-07-28 | End: 2025-07-30 | Stop reason: HOSPADM

## 2025-07-28 RX ORDER — FUROSEMIDE 20 MG/1
40 TABLET ORAL DAILY PRN
Status: ON HOLD | COMMUNITY
End: 2025-07-29 | Stop reason: HOSPADM

## 2025-07-28 RX ORDER — VIT A/VIT C/VIT E/ZINC/COPPER 4296-226
1 CAPSULE ORAL NIGHTLY
COMMUNITY

## 2025-07-28 RX ORDER — LISINOPRIL 10 MG/1
10 TABLET ORAL DAILY
Status: DISCONTINUED | OUTPATIENT
Start: 2025-07-28 | End: 2025-07-30 | Stop reason: HOSPADM

## 2025-07-28 RX ORDER — SODIUM CHLORIDE 0.9 % (FLUSH) 0.9 %
10 SYRINGE (ML) INJECTION EVERY 12 HOURS SCHEDULED
Status: DISCONTINUED | OUTPATIENT
Start: 2025-07-28 | End: 2025-07-30 | Stop reason: HOSPADM

## 2025-07-28 RX ORDER — SODIUM CHLORIDE 0.9 % (FLUSH) 0.9 %
10 SYRINGE (ML) INJECTION PRN
Status: DISCONTINUED | OUTPATIENT
Start: 2025-07-28 | End: 2025-07-30 | Stop reason: HOSPADM

## 2025-07-28 RX ORDER — SODIUM CHLORIDE 9 MG/ML
INJECTION, SOLUTION INTRAVENOUS PRN
Status: DISCONTINUED | OUTPATIENT
Start: 2025-07-28 | End: 2025-07-30 | Stop reason: HOSPADM

## 2025-07-28 RX ORDER — ONDANSETRON 2 MG/ML
4 INJECTION INTRAMUSCULAR; INTRAVENOUS EVERY 6 HOURS PRN
Status: DISCONTINUED | OUTPATIENT
Start: 2025-07-28 | End: 2025-07-30 | Stop reason: HOSPADM

## 2025-07-28 RX ORDER — MIRABEGRON 50 MG/1
50 TABLET, FILM COATED, EXTENDED RELEASE ORAL NIGHTLY
COMMUNITY

## 2025-07-28 RX ORDER — METOPROLOL SUCCINATE 25 MG/1
25 TABLET, EXTENDED RELEASE ORAL EVERY MORNING
COMMUNITY

## 2025-07-28 RX ORDER — INSULIN LISPRO 100 [IU]/ML
0-8 INJECTION, SOLUTION INTRAVENOUS; SUBCUTANEOUS
Status: DISCONTINUED | OUTPATIENT
Start: 2025-07-28 | End: 2025-07-30 | Stop reason: HOSPADM

## 2025-07-28 RX ORDER — GLUCAGON 1 MG/ML
1 KIT INJECTION PRN
Status: DISCONTINUED | OUTPATIENT
Start: 2025-07-28 | End: 2025-07-30 | Stop reason: HOSPADM

## 2025-07-28 RX ORDER — ATORVASTATIN CALCIUM 40 MG/1
40 TABLET, FILM COATED ORAL NIGHTLY
Status: DISCONTINUED | OUTPATIENT
Start: 2025-07-28 | End: 2025-07-30 | Stop reason: HOSPADM

## 2025-07-28 RX ORDER — ENOXAPARIN SODIUM 100 MG/ML
40 INJECTION SUBCUTANEOUS DAILY
Status: DISCONTINUED | OUTPATIENT
Start: 2025-07-28 | End: 2025-07-30 | Stop reason: HOSPADM

## 2025-07-28 RX ORDER — POTASSIUM CHLORIDE 7.45 MG/ML
10 INJECTION INTRAVENOUS PRN
Status: DISCONTINUED | OUTPATIENT
Start: 2025-07-28 | End: 2025-07-30 | Stop reason: HOSPADM

## 2025-07-28 RX ORDER — METOPROLOL TARTRATE 25 MG/1
25 TABLET, FILM COATED ORAL DAILY
Status: DISCONTINUED | OUTPATIENT
Start: 2025-07-28 | End: 2025-07-29

## 2025-07-28 RX ADMIN — SODIUM CHLORIDE, PRESERVATIVE FREE 10 ML: 5 INJECTION INTRAVENOUS at 21:47

## 2025-07-28 RX ADMIN — ACETAMINOPHEN 650 MG: 325 TABLET ORAL at 12:28

## 2025-07-28 RX ADMIN — LISINOPRIL 10 MG: 10 TABLET ORAL at 08:04

## 2025-07-28 RX ADMIN — SODIUM CHLORIDE 1000 ML: 0.9 INJECTION, SOLUTION INTRAVENOUS at 02:48

## 2025-07-28 RX ADMIN — MEROPENEM 1000 MG: 1 INJECTION INTRAVENOUS at 10:17

## 2025-07-28 RX ADMIN — ATORVASTATIN CALCIUM 40 MG: 40 TABLET, FILM COATED ORAL at 21:47

## 2025-07-28 RX ADMIN — IOPAMIDOL 75 ML: 755 INJECTION, SOLUTION INTRAVENOUS at 00:58

## 2025-07-28 RX ADMIN — METOPROLOL TARTRATE 25 MG: 25 TABLET, FILM COATED ORAL at 08:04

## 2025-07-28 RX ADMIN — METOPROLOL TARTRATE 25 MG: 25 TABLET, FILM COATED ORAL at 21:47

## 2025-07-28 RX ADMIN — FUROSEMIDE 20 MG: 40 TABLET ORAL at 08:04

## 2025-07-28 RX ADMIN — ENOXAPARIN SODIUM 40 MG: 100 INJECTION SUBCUTANEOUS at 08:04

## 2025-07-28 RX ADMIN — SODIUM CHLORIDE, PRESERVATIVE FREE 10 ML: 5 INJECTION INTRAVENOUS at 08:05

## 2025-07-28 ASSESSMENT — PAIN SCALES - GENERAL
PAINLEVEL_OUTOF10: 5
PAINLEVEL_OUTOF10: 0

## 2025-07-28 ASSESSMENT — PAIN DESCRIPTION - ORIENTATION: ORIENTATION: LEFT

## 2025-07-28 ASSESSMENT — PAIN - FUNCTIONAL ASSESSMENT: PAIN_FUNCTIONAL_ASSESSMENT: NONE - DENIES PAIN

## 2025-07-28 ASSESSMENT — PAIN DESCRIPTION - DESCRIPTORS: DESCRIPTORS: ACHING

## 2025-07-28 ASSESSMENT — PAIN DESCRIPTION - LOCATION: LOCATION: SHOULDER

## 2025-07-28 NOTE — CARE COORDINATION
Internal Medicine On-call Care Coordination Note    I was called by the ED physician because they recommended admission for this patient and we cover their PCP.  The history as I understand it after discussion with the ED physician is as follows:    Presents with progressive weakness, falls  Family unable to care for patient at home any longer    I placed admission orders.  Including:    General admission orders  Reconciled home meds  PT/OT  SW consult     Dr. Morel, or our coverage will see the patient tomorrow for H&P.    Electronically signed by DESMOND Matias CNP on 7/28/2025 at 2:49 AM

## 2025-07-28 NOTE — DISCHARGE INSTR - COC
(HCC) E11.9    Hx of colonic polyps Z86.0100    Chronic gout M1A.9XX0    MGUS (monoclonal gammopathy of unknown significance) D47.2    Near syncope R55    Aortic stenosis, mild I35.0    Mild dilation of ascending aorta I77.810    Cardiac pacemaker in situ Z95.0    Aortocoronary bypass status Z95.1    H/O mitral valve repair Z98.890    H/O aortic valve replacement Z95.2    Pure hypercholesterolemia E78.00    Rectal bleed K62.5    GI bleed K92.2    Acute respiratory failure due to COVID-19 ([facility]) U07.1, J96.00    Generalized weakness R53.1    Bladder cancer ([facility]) C67.9    Bacteremia R78.81    Moderate protein-calorie malnutrition E44.0    Failure to thrive in adult R62.7       Isolation/Infection:   Isolation            No Isolation          Patient Infection Status        Infection Onset Added Last Indicated Last Indicated By Review Planned Expiration    ESBL (Extended Spectrum Beta Lactamase) 23 Culture, Urine      Escherichia coli, blood, 2023  Escherichia coli, urine, 2023               Resolved       Infection Onset Added Last Indicated Last Indicated By Resolved Resolved By    COVID-19 22 COVID-19, Rapid 22 Infection                          Nurse Assessment:  Last Vital Signs: BP (!) 145/60   Pulse 60   Temp 99 °F (37.2 °C) (Oral)   Resp 22   Ht 1.805 m (5' 11.06\")   Wt 80.7 kg (178 lb)   SpO2 97%   BMI 24.78 kg/m²     Last documented pain score (0-10 scale):    Last Weight:   Wt Readings from Last 1 Encounters:   25 80.7 kg (178 lb)     Mental Status:  oriented and alert    IV Access:  - None    Nursing Mobility/ADLs:  Walking   Assisted  Transfer  Assisted  Bathing  Assisted  Dressing  Assisted  Toileting  Assisted  Feeding  Independent  Med Admin  Assisted  Med Delivery   whole    Wound Care Documentation and Therapy:        Elimination:  Continence:   Bowel: No  Bladder: No  Urinary Catheter: None   Colostomy/Ileostomy/Ileal

## 2025-07-28 NOTE — ED NOTES
ED to Inpatient Handoff Report    Notified 5s that electronic handoff available and patient ready for transport to room 525.    Safety Risks: None identified    Patient in Restraints: no    Constant Observer or Patient : no    Telemetry Monitoring Ordered: No          Order to transfer to unit without monitor: YES    Last MEWS: 1 Time completed: 1046    Deterioration Index: 27.14    Vitals:    07/28/25 0536 07/28/25 0637 07/28/25 0804 07/28/25 1046   BP: (!) 145/60 (!) 127/94 (!) 132/57 (!) 145/64   Pulse: 60 60 60 60   Resp: 22 11 16 16   Temp:   98.2 °F (36.8 °C) 98.2 °F (36.8 °C)   TempSrc:   Oral Oral   SpO2: 97% 98% 98% 98%   Weight:       Height:           Opportunity for questions and clarification was provided.

## 2025-07-28 NOTE — PROGRESS NOTES
Database initiated. Patient is A&O comes in from home with wife. States he uses mostly a cane and is RA at baseline. He is here for a fall.

## 2025-07-28 NOTE — H&P
Internal Medicine History & Physical     Name: Israel Delcid  : 1940  Chief Complaint: Extremity Weakness and Fall (Hit his back off the bathtub/toliet scrap on the left back side.)  Primary Care Physician: Suzy Espinoza MD  Admission date: 2025  Date of service: 2025   Unit: [facility] EMERGENCY DEPARTMENT     History of Present Illness  Israel is a 85 y.o. year old male.  He presented to the hospital with a chief complaint of generalized weakness and frequent falls.  He states that he has had 5 recent falls.  Most recently he fell yesterday when coming out of the shower.  He states that he hit his left back on his toilet when he fell.  His grandson found him.  He states that he falls because he loses his balance that he is not lightheaded or dizzy.  He describes himself is very weak.  Nothing in particular makes his symptoms better or worse.  Overall symptoms are moderate severity.  He denies any other associated symptoms.    There were no family or friends present at bedside.  History is provided by the patient.  He is felt to be a good historian.    ED course:   Initial blood work and imaging studies performed. Admission recommended by ED physician. My coverage discussed the case with the ED provider. Meds in the ED consisted of the following: IV fluid hydration    Past Medical History:   Diagnosis Date    Cancer (HCC)     bladder    Diabetes mellitus (HCC)     states glucose is running normal    Erectile dysfunction     Gout     Hypertension     states doing well    Vitamin D deficiency        Past Surgical History:   Procedure Laterality Date    APPENDECTOMY  1967    CARDIAC SURGERY  2020    CATARACT REMOVAL WITH IMPLANT Left 2013    CATARACT REMOVAL WITH IMPLANT Right 10/31/2013    CYSTOSCOPY N/A 3/7/2023    CYSTOSCOPY RETROGRADE PYELOGRAM performed by Costa Pena MD at [facility] OR    CYSTOSCOPY N/A 3/7/2023    BLADDER BIOPSY

## 2025-07-28 NOTE — PLAN OF CARE
Problem: ABCDS Injury Assessment  Goal: Absence of physical injury  Outcome: Progressing     Problem: Discharge Planning  Goal: Discharge to home or other facility with appropriate resources  Outcome: Progressing     Problem: Chronic Conditions and Co-morbidities  Goal: Patient's chronic conditions and co-morbidity symptoms are monitored and maintained or improved  Outcome: Progressing     Problem: Safety - Adult  Goal: Free from fall injury  Outcome: Progressing     Problem: Skin/Tissue Integrity  Goal: Skin integrity remains intact  Description: 1.  Monitor for areas of redness and/or skin breakdown  2.  Assess vascular access sites hourly  3.  Every 4-6 hours minimum:  Change oxygen saturation probe site  4.  Every 4-6 hours:  If on nasal continuous positive airway pressure, respiratory therapy assess nares and determine need for appliance change or resting period  Outcome: Progressing

## 2025-07-28 NOTE — ED PROVIDER NOTES
[facility] SURG/TELE  EMERGENCY DEPARTMENT ENCOUNTER        Pt Name: Israel Delcid  MRN: [id number]  Birthdate 1940  Date of evaluation: 7/27/2025  Provider: Mino Brewer DO  PCP: Suzy Espinoza MD  Note Started: 10:49 PM EDT 7/27/25    CHIEF COMPLAINT       Chief Complaint   Patient presents with    Extremity Weakness    Fall     Hit his back off the bathtub/toliet scrap on the left back side.       HISTORY OF PRESENT ILLNESS: 1 or more Elements   History From: PATIENT     Limitations to history : None    Israel Delcid is a 85 y.o. male arriving from home after he experienced a fall.  The patient reports he stood up to get out of the shower when he lost his balance falling hitting his back on the edge of the bathtub.  He denies hitting his head or loss of consciousness.  He is not on a blood thinner.  He is reporting a large bruise and some pain to his left lower back.  He is accompanied by family members who are reporting that he has been diffusely weak over the past month and they are unsure why.      Nursing Notes were all reviewed and agreed with or any disagreements were addressed in the HPI.    REVIEW OF SYSTEMS :      Review of Systems    POSITIVE (+): weakness, hematoma  NEGATIVE (-): fevers, chills, nausea, vomiting, diarrhea, constipation, shortness of breath, chest pain, abdominal pain      SURGICAL HISTORY     Past Surgical History:   Procedure Laterality Date    APPENDECTOMY  01/01/1967    CARDIAC SURGERY  06/08/2020    CATARACT REMOVAL WITH IMPLANT Left 06/06/2013    CATARACT REMOVAL WITH IMPLANT Right 10/31/2013    CYSTOSCOPY N/A 3/7/2023    CYSTOSCOPY RETROGRADE PYELOGRAM performed by [facility] Pena MD at [facility]    CYSTOSCOPY N/A 3/7/2023    BLADDER BIOPSY FULGARATION performed by Costa Pena MD at [facility]    HERNIA REPAIR  01/01/1970    [facility]    PACEMAKER PLACEMENT      PICC INSERTION VASCULAR ACCESS TEAM  4/16/2023    TONSILLECTOMY         CURRENTMEDICATIONS

## 2025-07-28 NOTE — PROGRESS NOTES
4 Eyes Skin Assessment     NAME:  Israel Delcid  YOB: 1940  MEDICAL RECORD NUMBER:  [id number]    The patient is being assessed for  Admission    I agree that at least one RN has performed a thorough Head to Toe Skin Assessment on the patient. ALL assessment sites listed below have been assessed.      Areas assessed by both nurses:    Head, Face, Ears, Shoulders, Back, Chest, Arms, Elbows, Hands, Sacrum. Buttock, Coccyx, Ischium, and Legs. Feet and Heels        Does the Patient have a Wound? No noted wound(s)       Pancho Prevention initiated by RN: Yes  Wound Care Orders initiated by RN: No    For hospital-acquired stage 1 & 2 and ALL Stage 3,4, Unstageable, DTI, NWPT, and Complex wounds: place order “IP Wound Care/Ostomy Nurse Eval and Treat” by RN under : No    New Ostomies, if present place, Ostomy referral order under : No     Nurse 1 eSignature: Electronically signed by CYNTHIA BLACKMON RN on 7/28/25 at 11:47 AM EDT    **SHARE this note so that the co-signing nurse can place an eSignature**    Nurse 2 eSignature: Electronically signed by Foreign Blackmon RN on 7/28/25 at 7:00 PM EDT

## 2025-07-29 LAB
ALBUMIN SERPL-MCNC: 3.1 G/DL (ref 3.5–5.2)
ALP SERPL-CCNC: 128 U/L (ref 40–129)
ALT SERPL-CCNC: 15 U/L (ref 0–50)
ANION GAP SERPL CALCULATED.3IONS-SCNC: 11 MMOL/L (ref 7–16)
AST SERPL-CCNC: 18 U/L (ref 0–50)
B PARAP IS1001 DNA NPH QL NAA+NON-PROBE: NOT DETECTED
B PERT DNA SPEC QL NAA+PROBE: NOT DETECTED
BASOPHILS # BLD: 0.02 K/UL (ref 0–0.2)
BASOPHILS NFR BLD: 0 % (ref 0–2)
BILIRUB SERPL-MCNC: 0.9 MG/DL (ref 0–1.2)
BUN SERPL-MCNC: 34 MG/DL (ref 8–23)
C PNEUM DNA NPH QL NAA+NON-PROBE: NOT DETECTED
CALCIUM SERPL-MCNC: 9 MG/DL (ref 8.8–10.2)
CHLORIDE SERPL-SCNC: 104 MMOL/L (ref 98–107)
CK SERPL-CCNC: 37 U/L (ref 0–190)
CO2 SERPL-SCNC: 20 MMOL/L (ref 22–29)
CREAT SERPL-MCNC: 1.1 MG/DL (ref 0.7–1.2)
CRP SERPL HS-MCNC: 172 MG/L (ref 0–5)
EOSINOPHIL # BLD: 0.03 K/UL (ref 0.05–0.5)
EOSINOPHILS RELATIVE PERCENT: 0 % (ref 0–6)
ERYTHROCYTE [DISTWIDTH] IN BLOOD BY AUTOMATED COUNT: 14.2 % (ref 11.5–15)
ERYTHROCYTE [SEDIMENTATION RATE] IN BLOOD BY WESTERGREN METHOD: 70 MM/HR (ref 0–15)
FERRITIN SERPL-MCNC: 669 NG/ML
FLUAV RNA NPH QL NAA+NON-PROBE: NOT DETECTED
FLUBV RNA NPH QL NAA+NON-PROBE: NOT DETECTED
FOLATE SERPL-MCNC: 26.3 NG/ML (ref 4.6–34.8)
GFR, ESTIMATED: 69 ML/MIN/1.73M2
GLUCOSE BLD-MCNC: 103 MG/DL (ref 74–99)
GLUCOSE BLD-MCNC: 164 MG/DL (ref 74–99)
GLUCOSE BLD-MCNC: 181 MG/DL (ref 74–99)
GLUCOSE BLD-MCNC: 233 MG/DL (ref 74–99)
GLUCOSE SERPL-MCNC: 107 MG/DL (ref 74–99)
HADV DNA NPH QL NAA+NON-PROBE: NOT DETECTED
HBA1C MFR BLD: 6.7 % (ref 4–5.6)
HCOV 229E RNA NPH QL NAA+NON-PROBE: NOT DETECTED
HCOV HKU1 RNA NPH QL NAA+NON-PROBE: NOT DETECTED
HCOV NL63 RNA NPH QL NAA+NON-PROBE: NOT DETECTED
HCOV OC43 RNA NPH QL NAA+NON-PROBE: NOT DETECTED
HCT VFR BLD AUTO: 25.3 % (ref 37–54)
HGB BLD-MCNC: 8.7 G/DL (ref 12.5–16.5)
HMPV RNA NPH QL NAA+NON-PROBE: NOT DETECTED
HPIV1 RNA NPH QL NAA+NON-PROBE: NOT DETECTED
HPIV2 RNA NPH QL NAA+NON-PROBE: NOT DETECTED
HPIV3 RNA NPH QL NAA+NON-PROBE: NOT DETECTED
HPIV4 RNA NPH QL NAA+NON-PROBE: NOT DETECTED
IMM GRANULOCYTES # BLD AUTO: 0.06 K/UL (ref 0–0.58)
IMM GRANULOCYTES NFR BLD: 1 % (ref 0–5)
IRON SATN MFR SERPL: 10 % (ref 20–55)
IRON SERPL-MCNC: 17 UG/DL (ref 61–157)
LYMPHOCYTES NFR BLD: 1.06 K/UL (ref 1.5–4)
LYMPHOCYTES RELATIVE PERCENT: 9 % (ref 20–42)
M PNEUMO DNA NPH QL NAA+NON-PROBE: NOT DETECTED
MCH RBC QN AUTO: 32.3 PG (ref 26–35)
MCHC RBC AUTO-ENTMCNC: 34.4 G/DL (ref 32–34.5)
MCV RBC AUTO: 94.1 FL (ref 80–99.9)
MONOCYTES NFR BLD: 1.02 K/UL (ref 0.1–0.95)
MONOCYTES NFR BLD: 9 % (ref 2–12)
NEUTROPHILS NFR BLD: 82 % (ref 43–80)
NEUTS SEG NFR BLD: 9.79 K/UL (ref 1.8–7.3)
PLATELET # BLD AUTO: 130 K/UL (ref 130–450)
PMV BLD AUTO: 10.5 FL (ref 7–12)
POTASSIUM SERPL-SCNC: 4.8 MMOL/L (ref 3.5–5.1)
PROT SERPL-MCNC: 6.6 G/DL (ref 6.4–8.3)
RBC # BLD AUTO: 2.69 M/UL (ref 3.8–5.8)
RSV RNA NPH QL NAA+NON-PROBE: NOT DETECTED
RV+EV RNA NPH QL NAA+NON-PROBE: NOT DETECTED
SARS-COV-2 RNA NPH QL NAA+NON-PROBE: NOT DETECTED
SODIUM SERPL-SCNC: 135 MMOL/L (ref 136–145)
SPECIMEN DESCRIPTION: NORMAL
TIBC SERPL-MCNC: 177 UG/DL (ref 250–450)
VIT B12 SERPL-MCNC: 1120 PG/ML (ref 232–1245)
WBC OTHER # BLD: 12 K/UL (ref 4.5–11.5)

## 2025-07-29 PROCEDURE — 97165 OT EVAL LOW COMPLEX 30 MIN: CPT

## 2025-07-29 PROCEDURE — 6370000000 HC RX 637 (ALT 250 FOR IP): Performed by: NURSE PRACTITIONER

## 2025-07-29 PROCEDURE — 97535 SELF CARE MNGMENT TRAINING: CPT

## 2025-07-29 PROCEDURE — 83036 HEMOGLOBIN GLYCOSYLATED A1C: CPT

## 2025-07-29 PROCEDURE — 82962 GLUCOSE BLOOD TEST: CPT

## 2025-07-29 PROCEDURE — 82550 ASSAY OF CK (CPK): CPT

## 2025-07-29 PROCEDURE — 83540 ASSAY OF IRON: CPT

## 2025-07-29 PROCEDURE — 83550 IRON BINDING TEST: CPT

## 2025-07-29 PROCEDURE — 85025 COMPLETE CBC W/AUTO DIFF WBC: CPT

## 2025-07-29 PROCEDURE — 1200000000 HC SEMI PRIVATE

## 2025-07-29 PROCEDURE — 97161 PT EVAL LOW COMPLEX 20 MIN: CPT

## 2025-07-29 PROCEDURE — 82607 VITAMIN B-12: CPT

## 2025-07-29 PROCEDURE — 80053 COMPREHEN METABOLIC PANEL: CPT

## 2025-07-29 PROCEDURE — 82728 ASSAY OF FERRITIN: CPT

## 2025-07-29 PROCEDURE — 82746 ASSAY OF FOLIC ACID SERUM: CPT

## 2025-07-29 PROCEDURE — 0202U NFCT DS 22 TRGT SARS-COV-2: CPT

## 2025-07-29 PROCEDURE — 2500000003 HC RX 250 WO HCPCS: Performed by: NURSE PRACTITIONER

## 2025-07-29 PROCEDURE — 97530 THERAPEUTIC ACTIVITIES: CPT

## 2025-07-29 PROCEDURE — 86140 C-REACTIVE PROTEIN: CPT

## 2025-07-29 PROCEDURE — 6360000002 HC RX W HCPCS: Performed by: NURSE PRACTITIONER

## 2025-07-29 PROCEDURE — 6370000000 HC RX 637 (ALT 250 FOR IP): Performed by: INTERNAL MEDICINE

## 2025-07-29 PROCEDURE — 85652 RBC SED RATE AUTOMATED: CPT

## 2025-07-29 RX ORDER — METOPROLOL SUCCINATE 25 MG/1
25 TABLET, EXTENDED RELEASE ORAL EVERY MORNING
Status: DISCONTINUED | OUTPATIENT
Start: 2025-07-29 | End: 2025-07-30 | Stop reason: HOSPADM

## 2025-07-29 RX ORDER — ALLOPURINOL 100 MG/1
100 TABLET ORAL EVERY MORNING
Status: DISCONTINUED | OUTPATIENT
Start: 2025-07-29 | End: 2025-07-30 | Stop reason: HOSPADM

## 2025-07-29 RX ORDER — TROSPIUM CHLORIDE 20 MG/1
20 TABLET, FILM COATED ORAL NIGHTLY
Status: DISCONTINUED | OUTPATIENT
Start: 2025-07-29 | End: 2025-07-30 | Stop reason: HOSPADM

## 2025-07-29 RX ADMIN — ACETAMINOPHEN 650 MG: 325 TABLET ORAL at 23:04

## 2025-07-29 RX ADMIN — METOPROLOL SUCCINATE 25 MG: 25 TABLET, EXTENDED RELEASE ORAL at 09:40

## 2025-07-29 RX ADMIN — TROSPIUM CHLORIDE 20 MG: 20 TABLET, FILM COATED ORAL at 20:41

## 2025-07-29 RX ADMIN — SODIUM CHLORIDE, PRESERVATIVE FREE 10 ML: 5 INJECTION INTRAVENOUS at 20:52

## 2025-07-29 RX ADMIN — ALLOPURINOL 100 MG: 100 TABLET ORAL at 09:40

## 2025-07-29 RX ADMIN — AMOXICILLIN AND CLAVULANATE POTASSIUM 1 TABLET: 875; 125 TABLET, FILM COATED ORAL at 20:42

## 2025-07-29 RX ADMIN — LISINOPRIL 10 MG: 10 TABLET ORAL at 09:40

## 2025-07-29 RX ADMIN — ATORVASTATIN CALCIUM 40 MG: 40 TABLET, FILM COATED ORAL at 20:41

## 2025-07-29 RX ADMIN — SODIUM CHLORIDE, PRESERVATIVE FREE 10 ML: 5 INJECTION INTRAVENOUS at 09:41

## 2025-07-29 RX ADMIN — ENOXAPARIN SODIUM 40 MG: 100 INJECTION SUBCUTANEOUS at 09:40

## 2025-07-29 RX ADMIN — INSULIN LISPRO 2 UNITS: 100 INJECTION, SOLUTION INTRAVENOUS; SUBCUTANEOUS at 17:07

## 2025-07-29 ASSESSMENT — PAIN SCALES - GENERAL
PAINLEVEL_OUTOF10: 4
PAINLEVEL_OUTOF10: 0

## 2025-07-29 ASSESSMENT — PAIN SCALES - WONG BAKER: WONGBAKER_NUMERICALRESPONSE: NO HURT

## 2025-07-29 ASSESSMENT — PAIN DESCRIPTION - DESCRIPTORS: DESCRIPTORS: SORE;DISCOMFORT

## 2025-07-29 ASSESSMENT — PAIN DESCRIPTION - LOCATION: LOCATION: BACK

## 2025-07-29 NOTE — PROGRESS NOTES
Occupational Therapy  OCCUPATIONAL THERAPY INITIAL EVALUATION    [facility]   [address] [place], [place]         Date:2025                                                  Patient Name: Israel Delcid    MRN: [id number]    : 1940    Room: [address] [place] [address]      Evaluating OT: Cammie Rodriguez OTR/L [id number]       Referring Provider:Lashell Collier APRN - CNP      Specific Provider Orders/Date: OT eval and treat 25      Diagnosis: Failure to Thrive in Adult     Surgery: none      Pertinent Medical History: Bladder Cancer, DM,HTN,Gout ,Pacemaker          Precautions:  Fall Risk, contact isolation    Assessment of current deficits:    [x] Functional mobility  [x]ADLs  [x] Strength               []Cognition    [x] Functional transfers   [x] IADLs         [] Safety Awareness   [x]Endurance    [x] Fine Coordination              [x] Balance      [] Vision/perception   []Sensation     []Gross Motor Coordination  [x] ROM  [] Delirium                   [] Motor Control     OT PLAN OF CARE   OT POC based on physician orders, patient diagnosis and results of clinical assessment    Frequency/Duration 2-5 days/wk for 2-4 weeks PRN   Specific OT Treatment Interventions to include:   * Instruction/training on adapted ADL techniques and AE recommendations to increase functional independence within precautions       * Training on energy conservation strategies, correct breathing pattern and techniques to improve independence/tolerance for self-care routine  * Functional transfer/mobility training/DME recommendations for increased independence, safety, and fall prevention  * Patient/Family education to increase follow through with safety techniques and functional independence  * Recommendation of environmental modifications for increased safety with functional transfers/mobility and ADLs  * Therapeutic activities to facilitate/challenge dynamic balance, stand

## 2025-07-29 NOTE — DISCHARGE SUMMARY
PROVIDED HISTORY: fall TECHNOLOGIST PROVIDED HISTORY: Reason for exam:->fall FINDINGS: The lungs are without acute focal process.  There is no effusion or pneumothorax. The cardiomediastinal silhouette is without acute process. The osseous structures are without acute process.     No acute process.     XR SHOULDER LEFT (MIN 2 VIEWS)  Result Date: 7/27/2025  EXAMINATION: THREE XRAY VIEWS OF THE LEFT SHOULDER 7/27/2025 11:42 pm COMPARISON: None. HISTORY: ORDERING SYSTEM PROVIDED HISTORY: weak, decreased ROM left shoulder TECHNOLOGIST PROVIDED HISTORY: Reason for exam:->weak, decreased ROM left shoulder FINDINGS: Glenohumeral joint is normally aligned.  No evidence of acute fracture or dislocation.  No abnormal periarticular calcifications.  AC and glenohumeral joint degenerative changes. Visualized lung is unremarkable.     AC and glenohumeral joint degenerative changes.       DISPOSITION:  The patient's condition is fair.   The patient is being discharged to nursing home    DISCHARGE MEDICATIONS:      Medication List        START taking these medications      amoxicillin-clavulanate 875-125 MG per tablet  Commonly known as: AUGMENTIN  Take 1 tablet by mouth every 12 hours for 7 days            CHANGE how you take these medications      lisinopril 10 MG tablet  Commonly known as: PRINIVIL;ZESTRIL  Take 1 tablet by mouth daily  What changed: when to take this     * Mens 50+ Multi Vitamin/Min Tabs  What changed: Another medication with the same name was removed. Continue taking this medication, and follow the directions you see here.     * PreserVision AREDS Caps  What changed: Another medication with the same name was removed. Continue taking this medication, and follow the directions you see here.           * This list has 2 medication(s) that are the same as other medications prescribed for you. Read the directions carefully, and ask your doctor or other care provider to review them with you.                CONTINUE

## 2025-07-29 NOTE — ACP (ADVANCE CARE PLANNING)
Advance Care Planning   Healthcare Decision Maker:    Primary Decision Maker: Elizabeth Delcid - Spouse - [phone]    Secondary Decision Maker: Stacie Delcidh - Child - [phone]    Click here to complete Healthcare Decision Makers including selection of the Healthcare Decision Maker Relationship (ie \"Primary\").

## 2025-07-29 NOTE — PROGRESS NOTES
Internal Medicine Progress Note    Patient's name: Israel Delcid  : 1940  Chief complaints (on day of admission): Extremity Weakness and Fall (Hit his back off the bathtub/toliet scrap on the left back side.)  Admission date: 2025  Date of service: 2025   Room: [address] MED SURG/TELE  Primary care physician: Suzy Espinoza MD  Reason for visit: Follow-up for generalized weakness, recurrent falls    Subjective  Israel is seen lying in bed awake and alert, in no distress. He reports ongoing weakness, asking for assistance as he is rotated in the bed. He does have some pain in the left shoulder which is chronic. He denies any nausea or vomiting, feels like his appetite has been good. He denies any fever or chills. Son is present at the bedside and expresses concerns over the sudden onset weakness. He reports that prior to two weeks ago patient had been doing well and ambulating without difficulty. He reports that over the last several weeks patient has been having difficulty getting around, had several falls and seems to be sleeping more. Discussed lab results from ER and evidence of dehydration. Discussed UA findings not being consistent with UTI. Discussed plan to check ESR, CRP, CK total and viral respiratory panel. Explained that he may have had a viral illness that led to dehydration which led to the weakness but that we may never find a definitive rationale. Son expressed that patient isn't able to return home in his current condition so they would be looking for rehab placement. No other issues or concerns from nursing.    Review of Systems  Full 10 point review of systems negative unless mentioned above.    Hospital Medications  Current Facility-Administered Medications   Medication Dose Route Frequency Provider Last Rate Last Admin    atorvastatin (LIPITOR) tablet 40 mg  40 mg Oral Nightly Lashell Collier APRN - CNP   40 mg at 25    furosemide (LASIX) tablet 20 mg  20

## 2025-07-29 NOTE — CARE COORDINATION
Transition of Care-met with patient and son Carlin, introduced myself and CM role in care coordination. Patient resides in a two story home with his wife Elizabeth, first floor utilized for bedroom/bath set up. Patient owns a WW, recent falls brought him to hospital. PCP is Dr. Espinoza, preferred pharmacy is [facility] in [place]. Past history with [facility] and [facility].  Discharge plan is SNF, list provided, [facility] was only choice, referral made, awaiting acceptance.     Roxann PORTERN, RN  [facility]

## 2025-07-29 NOTE — PROGRESS NOTES
Physical Therapy  Facility/Department: [address] MED SURG/TELE  Physical Therapy Initial Assessment    Name: Israel Delcid  : 1940  MRN: [id number]  Date of Service: 2025    Attending Provider:  Jame Morel DO    Evaluating PT:  Dustin Gamino Jr., P.T.    Room #:  0525/0525-A  Diagnosis:  Failure to thrive in adult [R62.7]  Acute cystitis with hematuria [N30.01]  Frequent falls [R29.6]  History of ESBL E. coli infection [Z86.19]  Chronic kidney disease, unspecified CKD stage [N18.9], pt states he feels weak and impaired balance and has had 5 recent falls as a result.  He also was cleaning out his trash cans a few weeks ago and when attempting to dump the water out he strained B shoulders L hurts more than R.   Pertinent PMHx/PSHx:  bladder CA   Precautions:  falls, bed/chair alarm    SUBJECTIVE:    Pt lives with his wife in a 1.5 story home with 2 stairs and 2 rails to enter. His bed and bath are on the first floor. Pt ambulated with a cane and furniture walked PTA.  He has a ww if needed.    OBJECTIVE:   Initial Evaluation  Date: 25 Treatment Short Term/ Long Term   Goals   Was pt agreeable to Eval/treatment? yes     Does pt have pain? No c/o pain at rest, but L shoulder and arm hurt with movement.     Bed Mobility  Rolling: MOD A  Supine to sit: MAX A  Sit to supine: NA  Scooting: MOD A to EOB  Independent    Transfers Sit to stand: MIN A/ MOD A  Stand to sit: MIN A/ MOD A  Stand pivot: MIN A with ww  SBA   Ambulation   4 feet forward/backward x2 reps and 3 feet with ww MIN A  200 feet with ww SBA   Stair negotiation: ascended and descended NA  2 steps with 2 rails SBA   AM-PAC 6 Clicks        BLE ROM is WFL.   BLE strength is grossly 3-/5 to 4/5.   Balance: sitting is supervision and standing with ww is MIN A  Endurance: fair-    Patient education  Pt educated on hand placement during transfers.    Patient response to education:   Pt verbalized understanding Pt demonstrated

## 2025-07-30 VITALS
DIASTOLIC BLOOD PRESSURE: 59 MMHG | SYSTOLIC BLOOD PRESSURE: 142 MMHG | OXYGEN SATURATION: 99 % | BODY MASS INDEX: 25.49 KG/M2 | HEIGHT: 71 IN | WEIGHT: 182.1 LBS | RESPIRATION RATE: 18 BRPM | HEART RATE: 59 BPM | TEMPERATURE: 99 F

## 2025-07-30 LAB
ALBUMIN SERPL-MCNC: 3 G/DL (ref 3.5–5.2)
ALP SERPL-CCNC: 131 U/L (ref 40–129)
ALT SERPL-CCNC: 13 U/L (ref 0–50)
ANION GAP SERPL CALCULATED.3IONS-SCNC: 11 MMOL/L (ref 7–16)
AST SERPL-CCNC: 19 U/L (ref 0–50)
BASOPHILS # BLD: 0.02 K/UL (ref 0–0.2)
BASOPHILS NFR BLD: 0 % (ref 0–2)
BILIRUB SERPL-MCNC: 1 MG/DL (ref 0–1.2)
BUN SERPL-MCNC: 30 MG/DL (ref 8–23)
CALCIUM SERPL-MCNC: 8.9 MG/DL (ref 8.8–10.2)
CHLORIDE SERPL-SCNC: 103 MMOL/L (ref 98–107)
CO2 SERPL-SCNC: 19 MMOL/L (ref 22–29)
CREAT SERPL-MCNC: 1 MG/DL (ref 0.7–1.2)
EOSINOPHIL # BLD: 0.02 K/UL (ref 0.05–0.5)
EOSINOPHILS RELATIVE PERCENT: 0 % (ref 0–6)
ERYTHROCYTE [DISTWIDTH] IN BLOOD BY AUTOMATED COUNT: 14.1 % (ref 11.5–15)
GFR, ESTIMATED: 75 ML/MIN/1.73M2
GLUCOSE BLD-MCNC: 126 MG/DL (ref 74–99)
GLUCOSE BLD-MCNC: 156 MG/DL (ref 74–99)
GLUCOSE SERPL-MCNC: 108 MG/DL (ref 74–99)
HCT VFR BLD AUTO: 25.4 % (ref 37–54)
HGB BLD-MCNC: 8.5 G/DL (ref 12.5–16.5)
IMM GRANULOCYTES # BLD AUTO: 0.05 K/UL (ref 0–0.58)
IMM GRANULOCYTES NFR BLD: 1 % (ref 0–5)
LYMPHOCYTES NFR BLD: 1.36 K/UL (ref 1.5–4)
LYMPHOCYTES RELATIVE PERCENT: 12 % (ref 20–42)
MCH RBC QN AUTO: 32 PG (ref 26–35)
MCHC RBC AUTO-ENTMCNC: 33.5 G/DL (ref 32–34.5)
MCV RBC AUTO: 95.5 FL (ref 80–99.9)
MICROORGANISM SPEC CULT: ABNORMAL
MONOCYTES NFR BLD: 0.93 K/UL (ref 0.1–0.95)
MONOCYTES NFR BLD: 8 % (ref 2–12)
NEUTROPHILS NFR BLD: 78 % (ref 43–80)
NEUTS SEG NFR BLD: 8.71 K/UL (ref 1.8–7.3)
PLATELET # BLD AUTO: 140 K/UL (ref 130–450)
PMV BLD AUTO: 10.4 FL (ref 7–12)
POTASSIUM SERPL-SCNC: 4.8 MMOL/L (ref 3.5–5.1)
PROT SERPL-MCNC: 6.6 G/DL (ref 6.4–8.3)
RBC # BLD AUTO: 2.66 M/UL (ref 3.8–5.8)
SERVICE CMNT-IMP: ABNORMAL
SODIUM SERPL-SCNC: 133 MMOL/L (ref 136–145)
SPECIMEN DESCRIPTION: ABNORMAL
WBC OTHER # BLD: 11.1 K/UL (ref 4.5–11.5)

## 2025-07-30 PROCEDURE — 2500000003 HC RX 250 WO HCPCS: Performed by: NURSE PRACTITIONER

## 2025-07-30 PROCEDURE — 6360000002 HC RX W HCPCS: Performed by: NURSE PRACTITIONER

## 2025-07-30 PROCEDURE — 6370000000 HC RX 637 (ALT 250 FOR IP): Performed by: NURSE PRACTITIONER

## 2025-07-30 PROCEDURE — 85025 COMPLETE CBC W/AUTO DIFF WBC: CPT

## 2025-07-30 PROCEDURE — 36415 COLL VENOUS BLD VENIPUNCTURE: CPT

## 2025-07-30 PROCEDURE — 80053 COMPREHEN METABOLIC PANEL: CPT

## 2025-07-30 PROCEDURE — 82962 GLUCOSE BLOOD TEST: CPT

## 2025-07-30 PROCEDURE — 6370000000 HC RX 637 (ALT 250 FOR IP): Performed by: INTERNAL MEDICINE

## 2025-07-30 RX ADMIN — AMOXICILLIN AND CLAVULANATE POTASSIUM 1 TABLET: 875; 125 TABLET, FILM COATED ORAL at 08:18

## 2025-07-30 RX ADMIN — ACETAMINOPHEN 650 MG: 325 TABLET ORAL at 11:16

## 2025-07-30 RX ADMIN — LISINOPRIL 10 MG: 10 TABLET ORAL at 08:18

## 2025-07-30 RX ADMIN — ENOXAPARIN SODIUM 40 MG: 100 INJECTION SUBCUTANEOUS at 08:19

## 2025-07-30 RX ADMIN — METOPROLOL SUCCINATE 25 MG: 25 TABLET, EXTENDED RELEASE ORAL at 08:18

## 2025-07-30 RX ADMIN — SENNOSIDES 8.6 MG: 8.6 TABLET, COATED ORAL at 11:17

## 2025-07-30 RX ADMIN — SODIUM CHLORIDE, PRESERVATIVE FREE 10 ML: 5 INJECTION INTRAVENOUS at 08:19

## 2025-07-30 RX ADMIN — ALLOPURINOL 100 MG: 100 TABLET ORAL at 08:18

## 2025-07-30 ASSESSMENT — PAIN SCALES - WONG BAKER: WONGBAKER_NUMERICALRESPONSE: NO HURT

## 2025-07-30 ASSESSMENT — PAIN SCALES - GENERAL
PAINLEVEL_OUTOF10: 0
PAINLEVEL_OUTOF10: 3

## 2025-07-30 ASSESSMENT — PAIN DESCRIPTION - LOCATION: LOCATION: SHOULDER

## 2025-07-30 ASSESSMENT — PAIN DESCRIPTION - DESCRIPTORS: DESCRIPTORS: ACHING;DISCOMFORT

## 2025-07-30 ASSESSMENT — PAIN - FUNCTIONAL ASSESSMENT: PAIN_FUNCTIONAL_ASSESSMENT: PREVENTS OR INTERFERES SOME ACTIVE ACTIVITIES AND ADLS

## 2025-07-30 NOTE — CARE COORDINATION
Transition of Care-patient accepted at [facility], pre-cert was submitted yesterday. Anticipate insurance auth in the next 24 hrs. [facility], [facility] and transport form completed.    Addendum-auth obtained, facility van will transport in the next hour. Wife and patient updated.    Roxann PORTERN, RN  [facility]

## 2025-07-30 NOTE — PROGRESS NOTES
Internal Medicine Progress Note    Patient's name: Israel Delcid  : 1940  Chief complaints (on day of admission): Extremity Weakness and Fall (Hit his back off the bathtub/toliet scrap on the left back side.)  Admission date: 2025  Date of service: 2025   Room: [address] MED SURG/TELE  Primary care physician: Suzy Espinoza MD  Reason for visit: Follow-up for generalized weakness, recurrent falls    Subjective  Israel is seen lying in bed asleep, in no distress.  He does easily awaken to voice.  He reports that he is feeling better today, feels like he \"turned a corner\" last night.  He denies any nausea or vomiting.  He does still have some mild discomfort when turning and repositioning but overall significantly improved.  He denies any fever or chills.  Denies any shortness of breath or difficulty breathing.  Son is present at the bedside, updated on testing from yesterday as well as treatment plan and initiation of antibiotics last night for bacteria in the urine and suspected UTI, all questions answered.  They are asking when he can go to rehab.  No other issues or concerns from nursing.    Review of Systems  Full 10 point review of systems negative unless mentioned above.    Hospital Medications  Current Facility-Administered Medications   Medication Dose Route Frequency Provider Last Rate Last Admin    allopurinol (ZYLOPRIM) tablet 100 mg  100 mg Oral Cinthia Dimas APRN - CNP   100 mg at 25 0940    metoprolol succinate (TOPROL XL) extended release tablet 25 mg  25 mg Oral Cinthia Dimas APRN - CNP   25 mg at 25 0940    trospium (SANCTURA) tablet 20 mg  20 mg Oral Nightly Cinthia Cummings, APRN - CNP   20 mg at 25    amoxicillin-clavulanate (AUGMENTIN) 875-125 MG per tablet 1 tablet  1 tablet Oral 2 times per day Jame Morel DO   1 tablet at 25    atorvastatin (LIPITOR) tablet 40 mg  40 mg Oral Nightly Lashell Collier APRN - CNP   40

## 2025-07-30 NOTE — PLAN OF CARE
Problem: ABCDS Injury Assessment  Goal: Absence of physical injury  Outcome: Progressing     Problem: Discharge Planning  Goal: Discharge to home or other facility with appropriate resources  Outcome: Progressing     Problem: Safety - Adult  Goal: Free from fall injury  7/30/2025 1023 by Chary Flores, RN  Outcome: Progressing  7/29/2025 2100 by Kanchan Grey, RN  Outcome: Progressing     Problem: Skin/Tissue Integrity  Goal: Skin integrity remains intact  Description: 1.  Monitor for areas of redness and/or skin breakdown  2.  Assess vascular access sites hourly  3.  Every 4-6 hours minimum:  Change oxygen saturation probe site  4.  Every 4-6 hours:  If on nasal continuous positive airway pressure, respiratory therapy assess nares and determine need for appliance change or resting period  Outcome: Progressing

## 2025-08-01 ENCOUNTER — OUTSIDE SERVICES (OUTPATIENT)
Dept: PRIMARY CARE CLINIC | Age: 85
End: 2025-08-01
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
## 2025-08-01 DIAGNOSIS — E11.9 TYPE 2 DIABETES MELLITUS WITHOUT COMPLICATION, WITHOUT LONG-TERM CURRENT USE OF INSULIN (HCC): ICD-10-CM

## 2025-08-01 DIAGNOSIS — R62.7 FAILURE TO THRIVE IN ADULT: ICD-10-CM

## 2025-08-01 DIAGNOSIS — Z91.81 AT MAXIMUM RISK FOR FALL: ICD-10-CM

## 2025-08-01 DIAGNOSIS — R53.1 GENERALIZED WEAKNESS: ICD-10-CM

## 2025-08-01 DIAGNOSIS — R53.81 PHYSICAL DECONDITIONING: ICD-10-CM

## 2025-08-01 DIAGNOSIS — I10 PRIMARY HYPERTENSION: ICD-10-CM

## 2025-08-01 DIAGNOSIS — N30.00 ACUTE CYSTITIS WITHOUT HEMATURIA: ICD-10-CM

## 2025-08-01 NOTE — PROGRESS NOTES
Food in the Last Year: Never true   Transportation Needs: No Transportation Needs (7/28/2025)    PRAPARE - Transportation     Lack of Transportation (Medical): No     Lack of Transportation (Non-Medical): No   Housing Stability: Low Risk  (7/28/2025)    Housing Stability Vital Sign     Unable to Pay for Housing in the Last Year: No     Number of Times Moved in the Last Year: 0     Homeless in the Last Year: No        HPI    Israel Delcid is a very pleasant and cooperative 85-year-old male.  Hospital course discussed in brief.  Patient was admitted to hospital after a fall and failure to thrive and found to have a urinary tract infection he was treated appropriately and improved however still had issues with weakness and was thus discharged to our facility in a stable condition.  Currently, patient states he feels okay however he does have right hip pain.  He states he has not had a bowel movement in 2 days.  He otherwise has no concerns.  He denies any fever, chills, lightheadedness, dizziness, nausea, vomiting, chest pain, shortness of breath, or issues with urination.     No Known Allergies     Review of Systems-as per HPI       Objective   Physical Exam  Constitutional:       Appearance: He is well-developed.   HENT:      Head: Normocephalic and atraumatic.   Eyes:      General:         Right eye: No discharge.         Left eye: No discharge.      Conjunctiva/sclera: Conjunctivae normal.   Neck:      Trachea: No tracheal deviation.   Cardiovascular:      Rate and Rhythm: Normal rate and regular rhythm.      Heart sounds: Normal heart sounds.   Pulmonary:      Effort: Pulmonary effort is normal. No respiratory distress.      Breath sounds: Normal breath sounds. No wheezing.   Abdominal:      General: Bowel sounds are normal. There is no distension.      Palpations: Abdomen is soft.      Tenderness: There is no abdominal tenderness.   Musculoskeletal:         General: Tenderness present.      Cervical back:

## 2025-08-04 ENCOUNTER — HOSPITAL ENCOUNTER (EMERGENCY)
Age: 85
Discharge: HOME | End: 2025-08-04
Attending: EMERGENCY MEDICINE
Payer: MEDICARE

## 2025-08-04 ENCOUNTER — APPOINTMENT (OUTPATIENT)
Dept: CT IMAGING | Age: 85
End: 2025-08-04
Payer: MEDICARE

## 2025-08-04 ENCOUNTER — APPOINTMENT (OUTPATIENT)
Dept: GENERAL RADIOLOGY | Age: 85
End: 2025-08-04
Payer: MEDICARE

## 2025-08-04 ENCOUNTER — APPOINTMENT (OUTPATIENT)
Dept: ULTRASOUND IMAGING | Age: 85
End: 2025-08-04
Payer: MEDICARE

## 2025-08-04 VITALS
DIASTOLIC BLOOD PRESSURE: 74 MMHG | WEIGHT: 175 LBS | HEART RATE: 64 BPM | RESPIRATION RATE: 20 BRPM | BODY MASS INDEX: 24.36 KG/M2 | OXYGEN SATURATION: 100 % | SYSTOLIC BLOOD PRESSURE: 168 MMHG | TEMPERATURE: 97.9 F

## 2025-08-04 DIAGNOSIS — M79.89 LEFT ARM SWELLING: ICD-10-CM

## 2025-08-04 DIAGNOSIS — M25.512 ACUTE PAIN OF LEFT SHOULDER: Primary | ICD-10-CM

## 2025-08-04 DIAGNOSIS — Z66 DNR (DO NOT RESUSCITATE): ICD-10-CM

## 2025-08-04 PROCEDURE — 6370000000 HC RX 637 (ALT 250 FOR IP): Performed by: EMERGENCY MEDICINE

## 2025-08-04 PROCEDURE — 73030 X-RAY EXAM OF SHOULDER: CPT

## 2025-08-04 PROCEDURE — 73502 X-RAY EXAM HIP UNI 2-3 VIEWS: CPT

## 2025-08-04 PROCEDURE — 93971 EXTREMITY STUDY: CPT

## 2025-08-04 PROCEDURE — 99284 EMERGENCY DEPT VISIT MOD MDM: CPT

## 2025-08-04 PROCEDURE — 73200 CT UPPER EXTREMITY W/O DYE: CPT

## 2025-08-04 RX ORDER — OXYCODONE HYDROCHLORIDE 5 MG/1
5 TABLET ORAL EVERY 6 HOURS PRN
Qty: 20 TABLET | Refills: 0 | Status: SHIPPED | OUTPATIENT
Start: 2025-08-04 | End: 2025-08-04

## 2025-08-04 RX ORDER — LISINOPRIL 10 MG/1
10 TABLET ORAL ONCE
Status: COMPLETED | OUTPATIENT
Start: 2025-08-04 | End: 2025-08-04

## 2025-08-04 RX ORDER — OXYCODONE HYDROCHLORIDE 5 MG/1
5 TABLET ORAL EVERY 4 HOURS PRN
Refills: 0 | Status: DISCONTINUED | OUTPATIENT
Start: 2025-08-04 | End: 2025-08-05 | Stop reason: HOSPADM

## 2025-08-04 RX ORDER — OXYCODONE HYDROCHLORIDE 5 MG/1
5 TABLET ORAL EVERY 6 HOURS PRN
Qty: 20 TABLET | Refills: 0 | Status: SHIPPED | OUTPATIENT
Start: 2025-08-04 | End: 2025-08-09

## 2025-08-04 RX ADMIN — OXYCODONE 5 MG: 5 TABLET ORAL at 15:36

## 2025-08-04 RX ADMIN — LISINOPRIL 10 MG: 10 TABLET ORAL at 18:46

## 2025-08-04 ASSESSMENT — PAIN SCALES - GENERAL: PAINLEVEL_OUTOF10: 8

## 2025-08-04 ASSESSMENT — PAIN DESCRIPTION - ONSET: ONSET: ON-GOING

## 2025-08-04 ASSESSMENT — PAIN DESCRIPTION - PAIN TYPE: TYPE: ACUTE PAIN

## 2025-08-04 ASSESSMENT — PAIN DESCRIPTION - ORIENTATION
ORIENTATION: LEFT;RIGHT
ORIENTATION: LEFT

## 2025-08-04 ASSESSMENT — PAIN DESCRIPTION - FREQUENCY: FREQUENCY: CONTINUOUS

## 2025-08-04 ASSESSMENT — PAIN DESCRIPTION - DESCRIPTORS
DESCRIPTORS: ACHING
DESCRIPTORS: ACHING

## 2025-08-04 ASSESSMENT — PAIN - FUNCTIONAL ASSESSMENT: PAIN_FUNCTIONAL_ASSESSMENT: 0-10

## 2025-08-04 ASSESSMENT — PAIN DESCRIPTION - LOCATION
LOCATION: HIP;SHOULDER
LOCATION: SHOULDER

## 2025-08-05 ENCOUNTER — TELEPHONE (OUTPATIENT)
Age: 85
End: 2025-08-05

## 2025-08-17 ENCOUNTER — HOSPITAL ENCOUNTER (INPATIENT)
Age: 85
LOS: 9 days | Discharge: HOSPICE | DRG: 690 | End: 2025-08-26
Attending: UROLOGY | Admitting: INTERNAL MEDICINE
Payer: MEDICARE

## 2025-08-17 DIAGNOSIS — R78.81 BACTEREMIA: Primary | ICD-10-CM

## 2025-08-17 PROBLEM — A41.9 SEPSIS DUE TO URINARY TRACT INFECTION (HCC): Status: ACTIVE | Noted: 2025-08-17

## 2025-08-17 PROBLEM — N39.0 SEPSIS DUE TO URINARY TRACT INFECTION (HCC): Status: ACTIVE | Noted: 2025-08-17

## 2025-08-17 PROCEDURE — 6370000000 HC RX 637 (ALT 250 FOR IP): Performed by: UROLOGY

## 2025-08-17 PROCEDURE — 6360000002 HC RX W HCPCS: Performed by: INTERNAL MEDICINE

## 2025-08-17 PROCEDURE — G0378 HOSPITAL OBSERVATION PER HR: HCPCS

## 2025-08-17 PROCEDURE — 87086 URINE CULTURE/COLONY COUNT: CPT

## 2025-08-17 PROCEDURE — 87077 CULTURE AEROBIC IDENTIFY: CPT

## 2025-08-17 PROCEDURE — 6370000000 HC RX 637 (ALT 250 FOR IP): Performed by: INTERNAL MEDICINE

## 2025-08-17 PROCEDURE — 2580000003 HC RX 258: Performed by: INTERNAL MEDICINE

## 2025-08-17 PROCEDURE — 6360000002 HC RX W HCPCS: Performed by: UROLOGY

## 2025-08-17 RX ORDER — IBUPROFEN 600 MG/1
600 TABLET, FILM COATED ORAL EVERY 8 HOURS PRN
Status: DISCONTINUED | OUTPATIENT
Start: 2025-08-17 | End: 2025-08-21

## 2025-08-17 RX ORDER — ZOLPIDEM TARTRATE 5 MG/1
5 TABLET ORAL NIGHTLY PRN
Status: DISCONTINUED | OUTPATIENT
Start: 2025-08-17 | End: 2025-08-18

## 2025-08-17 RX ORDER — HEPARIN SODIUM 5000 [USP'U]/ML
5000 INJECTION, SOLUTION INTRAVENOUS; SUBCUTANEOUS 2 TIMES DAILY
Status: DISCONTINUED | OUTPATIENT
Start: 2025-08-17 | End: 2025-08-19 | Stop reason: DRUGHIGH

## 2025-08-17 RX ORDER — ALLOPURINOL 100 MG/1
100 TABLET ORAL EVERY MORNING
Status: DISCONTINUED | OUTPATIENT
Start: 2025-08-18 | End: 2025-08-26 | Stop reason: HOSPADM

## 2025-08-17 RX ORDER — ACETAMINOPHEN 325 MG/1
650 TABLET ORAL EVERY 6 HOURS PRN
Status: DISCONTINUED | OUTPATIENT
Start: 2025-08-17 | End: 2025-08-26 | Stop reason: HOSPADM

## 2025-08-17 RX ORDER — METOPROLOL SUCCINATE 25 MG/1
25 TABLET, EXTENDED RELEASE ORAL EVERY MORNING
Status: DISCONTINUED | OUTPATIENT
Start: 2025-08-18 | End: 2025-08-26 | Stop reason: HOSPADM

## 2025-08-17 RX ORDER — ATORVASTATIN CALCIUM 40 MG/1
40 TABLET, FILM COATED ORAL NIGHTLY
Status: DISCONTINUED | OUTPATIENT
Start: 2025-08-17 | End: 2025-08-26 | Stop reason: HOSPADM

## 2025-08-17 RX ORDER — SODIUM CHLORIDE AND POTASSIUM CHLORIDE 150; 900 MG/100ML; MG/100ML
INJECTION, SOLUTION INTRAVENOUS CONTINUOUS
Status: DISCONTINUED | OUTPATIENT
Start: 2025-08-17 | End: 2025-08-19

## 2025-08-17 RX ORDER — ONDANSETRON 2 MG/ML
4 INJECTION INTRAMUSCULAR; INTRAVENOUS EVERY 6 HOURS PRN
Status: DISCONTINUED | OUTPATIENT
Start: 2025-08-17 | End: 2025-08-26 | Stop reason: HOSPADM

## 2025-08-17 RX ORDER — TROSPIUM CHLORIDE 20 MG/1
20 TABLET, FILM COATED ORAL NIGHTLY
Status: DISCONTINUED | OUTPATIENT
Start: 2025-08-17 | End: 2025-08-26 | Stop reason: HOSPADM

## 2025-08-17 RX ORDER — LISINOPRIL 10 MG/1
10 TABLET ORAL NIGHTLY
Status: DISCONTINUED | OUTPATIENT
Start: 2025-08-17 | End: 2025-08-26 | Stop reason: HOSPADM

## 2025-08-17 RX ADMIN — VANCOMYCIN HYDROCHLORIDE 1250 MG: 10 INJECTION, POWDER, LYOPHILIZED, FOR SOLUTION INTRAVENOUS at 15:16

## 2025-08-17 RX ADMIN — SODIUM CHLORIDE AND POTASSIUM CHLORIDE: .9; .15 SOLUTION INTRAVENOUS at 13:56

## 2025-08-17 RX ADMIN — ZOLPIDEM TARTRATE 5 MG: 5 TABLET ORAL at 20:47

## 2025-08-17 RX ADMIN — ATORVASTATIN CALCIUM 40 MG: 40 TABLET, FILM COATED ORAL at 20:47

## 2025-08-17 RX ADMIN — LISINOPRIL 10 MG: 10 TABLET ORAL at 20:47

## 2025-08-17 RX ADMIN — HEPARIN SODIUM 5000 UNITS: 5000 INJECTION, SOLUTION INTRAVENOUS; SUBCUTANEOUS at 20:48

## 2025-08-17 RX ADMIN — TROSPIUM CHLORIDE 20 MG: 20 TABLET, FILM COATED ORAL at 20:48

## 2025-08-17 ASSESSMENT — PAIN SCALES - GENERAL: PAINLEVEL_OUTOF10: 0

## 2025-08-18 PROBLEM — N39.0 SEPSIS SECONDARY TO UTI (HCC): Status: ACTIVE | Noted: 2025-08-18

## 2025-08-18 PROBLEM — A41.9 SEPSIS SECONDARY TO UTI (HCC): Status: ACTIVE | Noted: 2025-08-18

## 2025-08-18 LAB
ANION GAP SERPL CALCULATED.3IONS-SCNC: 11 MMOL/L (ref 7–16)
BUN SERPL-MCNC: 34 MG/DL (ref 8–23)
CALCIUM SERPL-MCNC: 8.8 MG/DL (ref 8.8–10.2)
CHLORIDE SERPL-SCNC: 105 MMOL/L (ref 98–107)
CO2 SERPL-SCNC: 19 MMOL/L (ref 22–29)
CREAT SERPL-MCNC: 1.1 MG/DL (ref 0.7–1.2)
ERYTHROCYTE [DISTWIDTH] IN BLOOD BY AUTOMATED COUNT: 14.6 % (ref 11.5–15)
GFR, ESTIMATED: 64 ML/MIN/1.73M2
GLUCOSE SERPL-MCNC: 122 MG/DL (ref 74–99)
HCT VFR BLD AUTO: 20.6 % (ref 37–54)
HCT VFR BLD AUTO: 21 % (ref 37–54)
HGB BLD-MCNC: 6.7 G/DL (ref 12.5–16.5)
HGB BLD-MCNC: 6.9 G/DL (ref 12.5–16.5)
MCH RBC QN AUTO: 30.9 PG (ref 26–35)
MCHC RBC AUTO-ENTMCNC: 31.9 G/DL (ref 32–34.5)
MCV RBC AUTO: 96.8 FL (ref 80–99.9)
PLATELET # BLD AUTO: 185 K/UL (ref 130–450)
PMV BLD AUTO: 9.9 FL (ref 7–12)
POTASSIUM SERPL-SCNC: 5 MMOL/L (ref 3.5–5.1)
RBC # BLD AUTO: 2.17 M/UL (ref 3.8–5.8)
SODIUM SERPL-SCNC: 134 MMOL/L (ref 136–145)
WBC OTHER # BLD: 7.9 K/UL (ref 4.5–11.5)

## 2025-08-18 PROCEDURE — 6360000002 HC RX W HCPCS: Performed by: UROLOGY

## 2025-08-18 PROCEDURE — P9016 RBC LEUKOCYTES REDUCED: HCPCS

## 2025-08-18 PROCEDURE — 2580000003 HC RX 258: Performed by: INTERNAL MEDICINE

## 2025-08-18 PROCEDURE — 85014 HEMATOCRIT: CPT

## 2025-08-18 PROCEDURE — 86850 RBC ANTIBODY SCREEN: CPT

## 2025-08-18 PROCEDURE — 87040 BLOOD CULTURE FOR BACTERIA: CPT

## 2025-08-18 PROCEDURE — 86901 BLOOD TYPING SEROLOGIC RH(D): CPT

## 2025-08-18 PROCEDURE — 87077 CULTURE AEROBIC IDENTIFY: CPT

## 2025-08-18 PROCEDURE — 36430 TRANSFUSION BLD/BLD COMPNT: CPT

## 2025-08-18 PROCEDURE — 86923 COMPATIBILITY TEST ELECTRIC: CPT

## 2025-08-18 PROCEDURE — B24BZZ4 ULTRASONOGRAPHY OF HEART WITH AORTA, TRANSESOPHAGEAL: ICD-10-PCS | Performed by: UROLOGY

## 2025-08-18 PROCEDURE — 1200000000 HC SEMI PRIVATE

## 2025-08-18 PROCEDURE — 99222 1ST HOSP IP/OBS MODERATE 55: CPT | Performed by: NURSE PRACTITIONER

## 2025-08-18 PROCEDURE — 6360000002 HC RX W HCPCS: Performed by: INTERNAL MEDICINE

## 2025-08-18 PROCEDURE — 85027 COMPLETE CBC AUTOMATED: CPT

## 2025-08-18 PROCEDURE — 6370000000 HC RX 637 (ALT 250 FOR IP): Performed by: UROLOGY

## 2025-08-18 PROCEDURE — 86900 BLOOD TYPING SEROLOGIC ABO: CPT

## 2025-08-18 PROCEDURE — 30233N1 TRANSFUSION OF NONAUTOLOGOUS RED BLOOD CELLS INTO PERIPHERAL VEIN, PERCUTANEOUS APPROACH: ICD-10-PCS | Performed by: UROLOGY

## 2025-08-18 PROCEDURE — 85018 HEMOGLOBIN: CPT

## 2025-08-18 PROCEDURE — 6370000000 HC RX 637 (ALT 250 FOR IP): Performed by: INTERNAL MEDICINE

## 2025-08-18 PROCEDURE — 87150 DNA/RNA AMPLIFIED PROBE: CPT

## 2025-08-18 PROCEDURE — 80048 BASIC METABOLIC PNL TOTAL CA: CPT

## 2025-08-18 RX ORDER — OXYCODONE AND ACETAMINOPHEN 5; 325 MG/1; MG/1
1 TABLET ORAL EVERY 4 HOURS PRN
Refills: 0 | Status: DISCONTINUED | OUTPATIENT
Start: 2025-08-18 | End: 2025-08-25

## 2025-08-18 RX ORDER — OXYCODONE AND ACETAMINOPHEN 5; 325 MG/1; MG/1
2 TABLET ORAL EVERY 4 HOURS PRN
Refills: 0 | Status: DISCONTINUED | OUTPATIENT
Start: 2025-08-18 | End: 2025-08-18

## 2025-08-18 RX ORDER — SODIUM CHLORIDE 9 MG/ML
INJECTION, SOLUTION INTRAVENOUS PRN
Status: DISCONTINUED | OUTPATIENT
Start: 2025-08-18 | End: 2025-08-26 | Stop reason: HOSPADM

## 2025-08-18 RX ADMIN — SODIUM CHLORIDE AND POTASSIUM CHLORIDE: .9; .15 SOLUTION INTRAVENOUS at 06:05

## 2025-08-18 RX ADMIN — HEPARIN SODIUM 5000 UNITS: 5000 INJECTION, SOLUTION INTRAVENOUS; SUBCUTANEOUS at 20:47

## 2025-08-18 RX ADMIN — OXYCODONE AND ACETAMINOPHEN 1 TABLET: 5; 325 TABLET ORAL at 20:48

## 2025-08-18 RX ADMIN — LISINOPRIL 10 MG: 10 TABLET ORAL at 20:48

## 2025-08-18 RX ADMIN — HEPARIN SODIUM 5000 UNITS: 5000 INJECTION, SOLUTION INTRAVENOUS; SUBCUTANEOUS at 09:23

## 2025-08-18 RX ADMIN — ATORVASTATIN CALCIUM 40 MG: 40 TABLET, FILM COATED ORAL at 20:49

## 2025-08-18 RX ADMIN — TROSPIUM CHLORIDE 20 MG: 20 TABLET, FILM COATED ORAL at 20:48

## 2025-08-18 RX ADMIN — VANCOMYCIN HYDROCHLORIDE 1250 MG: 10 INJECTION, POWDER, LYOPHILIZED, FOR SOLUTION INTRAVENOUS at 20:47

## 2025-08-18 RX ADMIN — IBUPROFEN 600 MG: 600 TABLET, FILM COATED ORAL at 05:39

## 2025-08-18 RX ADMIN — ALLOPURINOL 100 MG: 100 TABLET ORAL at 09:23

## 2025-08-18 ASSESSMENT — PAIN SCALES - GENERAL
PAINLEVEL_OUTOF10: 6
PAINLEVEL_OUTOF10: 2
PAINLEVEL_OUTOF10: 2
PAINLEVEL_OUTOF10: 5

## 2025-08-18 ASSESSMENT — PAIN DESCRIPTION - ORIENTATION
ORIENTATION: RIGHT;LEFT
ORIENTATION: RIGHT;LEFT

## 2025-08-18 ASSESSMENT — PAIN - FUNCTIONAL ASSESSMENT
PAIN_FUNCTIONAL_ASSESSMENT: 0-10
PAIN_FUNCTIONAL_ASSESSMENT: 0-10
PAIN_FUNCTIONAL_ASSESSMENT: PREVENTS OR INTERFERES SOME ACTIVE ACTIVITIES AND ADLS
PAIN_FUNCTIONAL_ASSESSMENT: PREVENTS OR INTERFERES SOME ACTIVE ACTIVITIES AND ADLS
PAIN_FUNCTIONAL_ASSESSMENT: 0-10

## 2025-08-18 ASSESSMENT — PAIN DESCRIPTION - DESCRIPTORS
DESCRIPTORS: ACHING;SORE
DESCRIPTORS: ACHING;CRAMPING

## 2025-08-18 ASSESSMENT — PAIN SCALES - WONG BAKER: WONGBAKER_NUMERICALRESPONSE: NO HURT

## 2025-08-18 ASSESSMENT — PAIN DESCRIPTION - LOCATION
LOCATION: LEG
LOCATION: HIP;SHOULDER

## 2025-08-19 ENCOUNTER — HOSPITAL ENCOUNTER (OUTPATIENT)
Age: 85
Discharge: HOME | End: 2025-08-19

## 2025-08-19 ENCOUNTER — ANESTHESIA EVENT (OUTPATIENT)
Age: 85
End: 2025-08-19
Payer: MEDICARE

## 2025-08-19 ENCOUNTER — ANESTHESIA (OUTPATIENT)
Age: 85
End: 2025-08-19
Payer: MEDICARE

## 2025-08-19 LAB
ABO/RH: NORMAL
ALBUMIN SERPL-MCNC: 2.5 G/DL (ref 3.5–5.2)
ALBUMIN SERPL-MCNC: 2.6 G/DL (ref 3.5–5.2)
ALP SERPL-CCNC: 145 U/L (ref 40–129)
ALP SERPL-CCNC: 149 U/L (ref 40–129)
ALT SERPL-CCNC: 28 U/L (ref 0–50)
ALT SERPL-CCNC: 29 U/L (ref 0–50)
ANION GAP SERPL CALCULATED.3IONS-SCNC: 9 MMOL/L (ref 7–16)
ANTIBODY SCREEN: NEGATIVE
ARM BAND NUMBER: NORMAL
AST SERPL-CCNC: 35 U/L (ref 0–50)
AST SERPL-CCNC: 35 U/L (ref 0–50)
BASOPHILS # BLD: 0.03 K/UL (ref 0–0.2)
BASOPHILS NFR BLD: 0 % (ref 0–2)
BILIRUB DIRECT SERPL-MCNC: 0.2 MG/DL (ref 0–0.2)
BILIRUB INDIRECT SERPL-MCNC: 0.2 MG/DL (ref 0–1)
BILIRUB SERPL-MCNC: 0.4 MG/DL (ref 0–1.2)
BILIRUB SERPL-MCNC: 0.5 MG/DL (ref 0–1.2)
BLOOD BANK BLOOD PRODUCT EXPIRATION DATE: NORMAL
BLOOD BANK DISPENSE STATUS: NORMAL
BLOOD BANK ISBT PRODUCT BLOOD TYPE: 9500
BLOOD BANK PRODUCT CODE: NORMAL
BLOOD BANK SAMPLE EXPIRATION: NORMAL
BLOOD BANK UNIT TYPE AND RH: NORMAL
BPU ID: NORMAL
BUN SERPL-MCNC: 32 MG/DL (ref 8–23)
CALCIUM SERPL-MCNC: 8.7 MG/DL (ref 8.8–10.2)
CHLORIDE SERPL-SCNC: 109 MMOL/L (ref 98–107)
CO2 SERPL-SCNC: 19 MMOL/L (ref 22–29)
COMPONENT: NORMAL
CREAT SERPL-MCNC: 0.9 MG/DL (ref 0.7–1.2)
CROSSMATCH RESULT: NORMAL
EOSINOPHIL # BLD: 0.1 K/UL (ref 0.05–0.5)
EOSINOPHILS RELATIVE PERCENT: 1 % (ref 0–6)
ERYTHROCYTE [DISTWIDTH] IN BLOOD BY AUTOMATED COUNT: 14.6 % (ref 11.5–15)
FERRITIN SERPL-MCNC: 654 NG/ML
FREE KAPPA/LAMBDA RATIO: 0.69 (ref 0.22–1.74)
GFR, ESTIMATED: 80 ML/MIN/1.73M2
GLUCOSE SERPL-MCNC: 111 MG/DL (ref 74–99)
HAPTOGLOB SERPL-MCNC: 217 MG/DL (ref 30–200)
HCT VFR BLD AUTO: 23.3 % (ref 37–54)
HGB BLD-MCNC: 7.7 G/DL (ref 12.5–16.5)
IMM GRANULOCYTES # BLD AUTO: 0.03 K/UL (ref 0–0.58)
IMM GRANULOCYTES NFR BLD: 0 % (ref 0–5)
IMM RETICS NFR: 17.3 % (ref 2.3–13.4)
IRON SATN MFR SERPL: 22 % (ref 20–55)
IRON SERPL-MCNC: 32 UG/DL (ref 61–157)
KAPPA LC FREE SER-MCNC: 75.9 MG/L
LAMBDA LC FREE SERPL-MCNC: 110 MG/L (ref 4.2–27.7)
LDH SERPL-CCNC: 245 U/L (ref 135–225)
LYMPHOCYTES NFR BLD: 1.04 K/UL (ref 1.5–4)
LYMPHOCYTES RELATIVE PERCENT: 14 % (ref 20–42)
MCH RBC QN AUTO: 31.2 PG (ref 26–35)
MCHC RBC AUTO-ENTMCNC: 33 G/DL (ref 32–34.5)
MCV RBC AUTO: 94.3 FL (ref 80–99.9)
MICROORGANISM SPEC CULT: ABNORMAL
MICROORGANISM SPEC CULT: ABNORMAL
MONOCYTES NFR BLD: 0.75 K/UL (ref 0.1–0.95)
MONOCYTES NFR BLD: 10 % (ref 2–12)
NEUTROPHILS NFR BLD: 75 % (ref 43–80)
NEUTS SEG NFR BLD: 5.75 K/UL (ref 1.8–7.3)
PLATELET # BLD AUTO: 190 K/UL (ref 130–450)
PMV BLD AUTO: 9.4 FL (ref 7–12)
POTASSIUM SERPL-SCNC: 5.2 MMOL/L (ref 3.5–5.1)
POTASSIUM SERPL-SCNC: 5.3 MMOL/L (ref 3.5–5.1)
PROT SERPL-MCNC: 6.4 G/DL (ref 6.4–8.3)
PROT SERPL-MCNC: 6.5 G/DL (ref 6.4–8.3)
PSA SERPL-MCNC: 0.21 NG/ML (ref 0–4)
RBC # BLD AUTO: 2.47 M/UL (ref 3.8–5.8)
RETIC HEMOGLOBIN: 27.8 PG (ref 28.2–36.6)
RETICS # AUTO: 0.03 M/UL
RETICS/RBC NFR AUTO: 1 % (ref 0.4–1.9)
SODIUM SERPL-SCNC: 137 MMOL/L (ref 136–145)
SPECIMEN DESCRIPTION: ABNORMAL
TIBC SERPL-MCNC: 147 UG/DL (ref 250–450)
TRANSFUSION STATUS: NORMAL
UNIT DIVISION: 0
UNIT ISSUE DATE/TIME: NORMAL
WBC OTHER # BLD: 7.7 K/UL (ref 4.5–11.5)

## 2025-08-19 PROCEDURE — 86334 IMMUNOFIX E-PHORESIS SERUM: CPT

## 2025-08-19 PROCEDURE — 83521 IG LIGHT CHAINS FREE EACH: CPT

## 2025-08-19 PROCEDURE — 82728 ASSAY OF FERRITIN: CPT

## 2025-08-19 PROCEDURE — 1200000000 HC SEMI PRIVATE

## 2025-08-19 PROCEDURE — 80053 COMPREHEN METABOLIC PANEL: CPT

## 2025-08-19 PROCEDURE — 97161 PT EVAL LOW COMPLEX 20 MIN: CPT

## 2025-08-19 PROCEDURE — 83540 ASSAY OF IRON: CPT

## 2025-08-19 PROCEDURE — 6370000000 HC RX 637 (ALT 250 FOR IP): Performed by: UROLOGY

## 2025-08-19 PROCEDURE — G0103 PSA SCREENING: HCPCS

## 2025-08-19 PROCEDURE — 6370000000 HC RX 637 (ALT 250 FOR IP): Performed by: INTERNAL MEDICINE

## 2025-08-19 PROCEDURE — 97530 THERAPEUTIC ACTIVITIES: CPT

## 2025-08-19 PROCEDURE — 80076 HEPATIC FUNCTION PANEL: CPT

## 2025-08-19 PROCEDURE — 85045 AUTOMATED RETICULOCYTE COUNT: CPT

## 2025-08-19 PROCEDURE — 85025 COMPLETE CBC W/AUTO DIFF WBC: CPT

## 2025-08-19 PROCEDURE — 6360000002 HC RX W HCPCS: Performed by: INTERNAL MEDICINE

## 2025-08-19 PROCEDURE — 83615 LACTATE (LD) (LDH) ENZYME: CPT

## 2025-08-19 PROCEDURE — 2580000003 HC RX 258: Performed by: NURSE PRACTITIONER

## 2025-08-19 PROCEDURE — 83010 ASSAY OF HAPTOGLOBIN QUANT: CPT

## 2025-08-19 PROCEDURE — 6360000002 HC RX W HCPCS: Performed by: UROLOGY

## 2025-08-19 PROCEDURE — 97165 OT EVAL LOW COMPLEX 30 MIN: CPT

## 2025-08-19 PROCEDURE — 36415 COLL VENOUS BLD VENIPUNCTURE: CPT

## 2025-08-19 PROCEDURE — 84165 PROTEIN E-PHORESIS SERUM: CPT

## 2025-08-19 PROCEDURE — 83550 IRON BINDING TEST: CPT

## 2025-08-19 PROCEDURE — 84132 ASSAY OF SERUM POTASSIUM: CPT

## 2025-08-19 PROCEDURE — 84155 ASSAY OF PROTEIN SERUM: CPT

## 2025-08-19 PROCEDURE — 2580000003 HC RX 258: Performed by: INTERNAL MEDICINE

## 2025-08-19 RX ORDER — SODIUM CHLORIDE 9 MG/ML
INJECTION, SOLUTION INTRAVENOUS CONTINUOUS
Status: DISCONTINUED | OUTPATIENT
Start: 2025-08-19 | End: 2025-08-20

## 2025-08-19 RX ORDER — HEPARIN SODIUM 5000 [USP'U]/ML
5000 INJECTION, SOLUTION INTRAVENOUS; SUBCUTANEOUS EVERY 8 HOURS SCHEDULED
Status: DISCONTINUED | OUTPATIENT
Start: 2025-08-19 | End: 2025-08-26 | Stop reason: HOSPADM

## 2025-08-19 RX ADMIN — METOPROLOL SUCCINATE 25 MG: 25 TABLET, EXTENDED RELEASE ORAL at 08:53

## 2025-08-19 RX ADMIN — ACETAMINOPHEN 650 MG: 325 TABLET ORAL at 16:46

## 2025-08-19 RX ADMIN — ALLOPURINOL 100 MG: 100 TABLET ORAL at 08:54

## 2025-08-19 RX ADMIN — SODIUM CHLORIDE AND POTASSIUM CHLORIDE: .9; .15 SOLUTION INTRAVENOUS at 00:26

## 2025-08-19 RX ADMIN — VANCOMYCIN HYDROCHLORIDE 1250 MG: 10 INJECTION, POWDER, LYOPHILIZED, FOR SOLUTION INTRAVENOUS at 21:03

## 2025-08-19 RX ADMIN — SODIUM CHLORIDE: 0.9 INJECTION, SOLUTION INTRAVENOUS at 10:26

## 2025-08-19 RX ADMIN — HEPARIN SODIUM 5000 UNITS: 5000 INJECTION INTRAVENOUS; SUBCUTANEOUS at 22:00

## 2025-08-19 RX ADMIN — TROSPIUM CHLORIDE 20 MG: 20 TABLET, FILM COATED ORAL at 20:57

## 2025-08-19 RX ADMIN — HEPARIN SODIUM 5000 UNITS: 5000 INJECTION INTRAVENOUS; SUBCUTANEOUS at 14:12

## 2025-08-19 RX ADMIN — OXYCODONE AND ACETAMINOPHEN 1 TABLET: 5; 325 TABLET ORAL at 20:56

## 2025-08-19 RX ADMIN — ATORVASTATIN CALCIUM 40 MG: 40 TABLET, FILM COATED ORAL at 20:57

## 2025-08-19 ASSESSMENT — PAIN DESCRIPTION - ORIENTATION
ORIENTATION: RIGHT;LEFT
ORIENTATION: LEFT

## 2025-08-19 ASSESSMENT — PAIN - FUNCTIONAL ASSESSMENT
PAIN_FUNCTIONAL_ASSESSMENT: 0-10
PAIN_FUNCTIONAL_ASSESSMENT: 0-10
PAIN_FUNCTIONAL_ASSESSMENT: PREVENTS OR INTERFERES SOME ACTIVE ACTIVITIES AND ADLS
PAIN_FUNCTIONAL_ASSESSMENT: 0-10
PAIN_FUNCTIONAL_ASSESSMENT: PREVENTS OR INTERFERES SOME ACTIVE ACTIVITIES AND ADLS

## 2025-08-19 ASSESSMENT — PAIN DESCRIPTION - LOCATION
LOCATION: HIP
LOCATION: ARM;GENERALIZED

## 2025-08-19 ASSESSMENT — PAIN DESCRIPTION - DESCRIPTORS
DESCRIPTORS: ACHING;DISCOMFORT
DESCRIPTORS: SHARP

## 2025-08-19 ASSESSMENT — PAIN SCALES - GENERAL
PAINLEVEL_OUTOF10: 3
PAINLEVEL_OUTOF10: 6

## 2025-08-19 ASSESSMENT — LIFESTYLE VARIABLES: SMOKING_STATUS: 0

## 2025-08-20 ENCOUNTER — HOSPITAL ENCOUNTER (INPATIENT)
Age: 85
Discharge: HOME | DRG: 690 | End: 2025-08-22
Attending: UROLOGY
Payer: MEDICARE

## 2025-08-20 VITALS
TEMPERATURE: 96.8 F | HEART RATE: 59 BPM | OXYGEN SATURATION: 96 % | RESPIRATION RATE: 24 BRPM | SYSTOLIC BLOOD PRESSURE: 140 MMHG | DIASTOLIC BLOOD PRESSURE: 57 MMHG

## 2025-08-20 LAB
ALBUMIN SERPL-MCNC: 2 G/DL (ref 3.5–4.7)
ALBUMIN SERPL-MCNC: 2.5 G/DL (ref 3.5–5.2)
ALP SERPL-CCNC: 151 U/L (ref 40–129)
ALPHA1 GLOB SERPL ELPH-MCNC: 0.5 G/DL (ref 0.2–0.4)
ALPHA2 GLOB SERPL ELPH-MCNC: 0.8 G/DL (ref 0.5–1)
ALT SERPL-CCNC: 35 U/L (ref 0–50)
ANION GAP SERPL CALCULATED.3IONS-SCNC: 10 MMOL/L (ref 7–16)
AST SERPL-CCNC: 40 U/L (ref 0–50)
B-GLOBULIN SERPL ELPH-MCNC: 1 G/DL (ref 0.8–1.3)
BASOPHILS # BLD: 0.04 K/UL (ref 0–0.2)
BASOPHILS NFR BLD: 0 % (ref 0–2)
BILIRUB SERPL-MCNC: 0.6 MG/DL (ref 0–1.2)
BUN SERPL-MCNC: 25 MG/DL (ref 8–23)
CALCIUM SERPL-MCNC: 8.6 MG/DL (ref 8.8–10.2)
CHLORIDE SERPL-SCNC: 108 MMOL/L (ref 98–107)
CO2 SERPL-SCNC: 17 MMOL/L (ref 22–29)
CREAT SERPL-MCNC: 0.9 MG/DL (ref 0.7–1.2)
EOSINOPHIL # BLD: 0.07 K/UL (ref 0.05–0.5)
EOSINOPHILS RELATIVE PERCENT: 1 % (ref 0–6)
ERYTHROCYTE [DISTWIDTH] IN BLOOD BY AUTOMATED COUNT: 14.6 % (ref 11.5–15)
GAMMA GLOB SERPL ELPH-MCNC: 1.5 G/DL (ref 0.7–1.6)
GFR, ESTIMATED: 84 ML/MIN/1.73M2
GLUCOSE SERPL-MCNC: 88 MG/DL (ref 74–99)
HCT VFR BLD AUTO: 25.8 % (ref 37–54)
HGB BLD-MCNC: 8.1 G/DL (ref 12.5–16.5)
IMM GRANULOCYTES # BLD AUTO: 0.05 K/UL (ref 0–0.58)
IMM GRANULOCYTES NFR BLD: 1 % (ref 0–5)
INTERPRETATION SERPL IFE-IMP: NORMAL
LYMPHOCYTES NFR BLD: 0.92 K/UL (ref 1.5–4)
LYMPHOCYTES RELATIVE PERCENT: 10 % (ref 20–42)
M PROTEIN SERPL ELPH-MCNC: 0.8 G/DL
MCH RBC QN AUTO: 30.7 PG (ref 26–35)
MCHC RBC AUTO-ENTMCNC: 31.4 G/DL (ref 32–34.5)
MCV RBC AUTO: 97.7 FL (ref 80–99.9)
MONOCYTES NFR BLD: 0.7 K/UL (ref 0.1–0.95)
MONOCYTES NFR BLD: 8 % (ref 2–12)
NEUTROPHILS NFR BLD: 80 % (ref 43–80)
NEUTS SEG NFR BLD: 7.21 K/UL (ref 1.8–7.3)
PATH REV: NORMAL
PATHOLOGIST: ABNORMAL
PLATELET # BLD AUTO: 182 K/UL (ref 130–450)
PMV BLD AUTO: 9.6 FL (ref 7–12)
POTASSIUM SERPL-SCNC: 4.9 MMOL/L (ref 3.5–5.1)
PROT PATTERN SERPL ELPH-IMP: ABNORMAL
PROT SERPL-MCNC: 5.7 G/DL (ref 6.4–8.3)
PROT SERPL-MCNC: 6.5 G/DL (ref 6.4–8.3)
RBC # BLD AUTO: 2.64 M/UL (ref 3.8–5.8)
SODIUM SERPL-SCNC: 135 MMOL/L (ref 136–145)
WBC OTHER # BLD: 9 K/UL (ref 4.5–11.5)

## 2025-08-20 PROCEDURE — 85025 COMPLETE CBC W/AUTO DIFF WBC: CPT

## 2025-08-20 PROCEDURE — 93312 ECHO TRANSESOPHAGEAL: CPT

## 2025-08-20 PROCEDURE — 80053 COMPREHEN METABOLIC PANEL: CPT

## 2025-08-20 PROCEDURE — 7100000010 HC PHASE II RECOVERY - FIRST 15 MIN

## 2025-08-20 PROCEDURE — 6360000002 HC RX W HCPCS: Performed by: NURSE ANESTHETIST, CERTIFIED REGISTERED

## 2025-08-20 PROCEDURE — 6360000002 HC RX W HCPCS: Performed by: UROLOGY

## 2025-08-20 PROCEDURE — 1200000000 HC SEMI PRIVATE

## 2025-08-20 PROCEDURE — 51798 US URINE CAPACITY MEASURE: CPT

## 2025-08-20 PROCEDURE — 6370000000 HC RX 637 (ALT 250 FOR IP): Performed by: UROLOGY

## 2025-08-20 PROCEDURE — 87040 BLOOD CULTURE FOR BACTERIA: CPT

## 2025-08-20 PROCEDURE — 7100000011 HC PHASE II RECOVERY - ADDTL 15 MIN

## 2025-08-20 PROCEDURE — 6360000002 HC RX W HCPCS: Performed by: REGISTERED NURSE

## 2025-08-20 PROCEDURE — 2580000003 HC RX 258: Performed by: REGISTERED NURSE

## 2025-08-20 PROCEDURE — 99232 SBSQ HOSP IP/OBS MODERATE 35: CPT | Performed by: NURSE PRACTITIONER

## 2025-08-20 PROCEDURE — 2580000003 HC RX 258: Performed by: NURSE ANESTHETIST, CERTIFIED REGISTERED

## 2025-08-20 PROCEDURE — 36415 COLL VENOUS BLD VENIPUNCTURE: CPT

## 2025-08-20 PROCEDURE — 6370000000 HC RX 637 (ALT 250 FOR IP): Performed by: INTERNAL MEDICINE

## 2025-08-20 PROCEDURE — 3700000001 HC ADD 15 MINUTES (ANESTHESIA)

## 2025-08-20 PROCEDURE — 3700000000 HC ANESTHESIA ATTENDED CARE

## 2025-08-20 RX ORDER — PROPOFOL 10 MG/ML
INJECTION, EMULSION INTRAVENOUS
Status: DISCONTINUED | OUTPATIENT
Start: 2025-08-20 | End: 2025-08-20 | Stop reason: SDUPTHER

## 2025-08-20 RX ORDER — SODIUM CHLORIDE 9 MG/ML
INJECTION, SOLUTION INTRAVENOUS
Status: DISCONTINUED | OUTPATIENT
Start: 2025-08-20 | End: 2025-08-20

## 2025-08-20 RX ORDER — SODIUM CHLORIDE 9 MG/ML
INJECTION, SOLUTION INTRAVENOUS
Status: DISCONTINUED | OUTPATIENT
Start: 2025-08-20 | End: 2025-08-20 | Stop reason: SDUPTHER

## 2025-08-20 RX ADMIN — ATORVASTATIN CALCIUM 40 MG: 40 TABLET, FILM COATED ORAL at 21:53

## 2025-08-20 RX ADMIN — PROPOFOL 150 MG: 10 INJECTION, EMULSION INTRAVENOUS at 10:12

## 2025-08-20 RX ADMIN — AMPICILLIN INJECTION 2000 MG: 2 POWDER, FOR SOLUTION INTRAMUSCULAR; INTRAVENOUS at 21:59

## 2025-08-20 RX ADMIN — HEPARIN SODIUM 5000 UNITS: 5000 INJECTION INTRAVENOUS; SUBCUTANEOUS at 14:33

## 2025-08-20 RX ADMIN — OXYCODONE AND ACETAMINOPHEN 1 TABLET: 5; 325 TABLET ORAL at 21:53

## 2025-08-20 RX ADMIN — METOPROLOL SUCCINATE 25 MG: 25 TABLET, EXTENDED RELEASE ORAL at 11:49

## 2025-08-20 RX ADMIN — SODIUM CHLORIDE: 9 INJECTION, SOLUTION INTRAVENOUS at 10:04

## 2025-08-20 RX ADMIN — TROSPIUM CHLORIDE 20 MG: 20 TABLET, FILM COATED ORAL at 21:53

## 2025-08-20 RX ADMIN — ALLOPURINOL 100 MG: 100 TABLET ORAL at 11:49

## 2025-08-20 RX ADMIN — AMPICILLIN INJECTION 2000 MG: 2 POWDER, FOR SOLUTION INTRAMUSCULAR; INTRAVENOUS at 16:38

## 2025-08-20 RX ADMIN — AMPICILLIN INJECTION 2000 MG: 2 POWDER, FOR SOLUTION INTRAMUSCULAR; INTRAVENOUS at 12:21

## 2025-08-20 RX ADMIN — HEPARIN SODIUM 5000 UNITS: 5000 INJECTION INTRAVENOUS; SUBCUTANEOUS at 21:54

## 2025-08-20 ASSESSMENT — PAIN SCALES - GENERAL: PAINLEVEL_OUTOF10: 0

## 2025-08-21 PROBLEM — E43 SEVERE PROTEIN-CALORIE MALNUTRITION: Status: ACTIVE | Noted: 2023-04-19

## 2025-08-21 LAB
ALBUMIN SERPL-MCNC: 2.4 G/DL (ref 3.5–5.2)
ALP SERPL-CCNC: 144 U/L (ref 40–129)
ALT SERPL-CCNC: 34 U/L (ref 0–50)
ANION GAP SERPL CALCULATED.3IONS-SCNC: 11 MMOL/L (ref 7–16)
AST SERPL-CCNC: 34 U/L (ref 0–50)
BASOPHILS # BLD: 0.03 K/UL (ref 0–0.2)
BASOPHILS NFR BLD: 0 % (ref 0–2)
BILIRUB SERPL-MCNC: 0.5 MG/DL (ref 0–1.2)
BUN SERPL-MCNC: 23 MG/DL (ref 8–23)
CALCIUM SERPL-MCNC: 8.8 MG/DL (ref 8.8–10.2)
CHLORIDE SERPL-SCNC: 109 MMOL/L (ref 98–107)
CO2 SERPL-SCNC: 18 MMOL/L (ref 22–29)
CREAT SERPL-MCNC: 0.9 MG/DL (ref 0.7–1.2)
ECHO BSA: 1.92 M2
ECHO LV EJECTION FRACTION 3D: 60 %
EOSINOPHIL # BLD: 0.07 K/UL (ref 0.05–0.5)
EOSINOPHILS RELATIVE PERCENT: 1 % (ref 0–6)
ERYTHROCYTE [DISTWIDTH] IN BLOOD BY AUTOMATED COUNT: 14.6 % (ref 11.5–15)
GFR, ESTIMATED: 82 ML/MIN/1.73M2
GLUCOSE SERPL-MCNC: 87 MG/DL (ref 74–99)
HCT VFR BLD AUTO: 21.6 % (ref 37–54)
HGB BLD-MCNC: 7.2 G/DL (ref 12.5–16.5)
IMM GRANULOCYTES # BLD AUTO: 0.05 K/UL (ref 0–0.58)
IMM GRANULOCYTES NFR BLD: 1 % (ref 0–5)
LYMPHOCYTES NFR BLD: 1.2 K/UL (ref 1.5–4)
LYMPHOCYTES RELATIVE PERCENT: 16 % (ref 20–42)
MCH RBC QN AUTO: 30.6 PG (ref 26–35)
MCHC RBC AUTO-ENTMCNC: 33.3 G/DL (ref 32–34.5)
MCV RBC AUTO: 91.9 FL (ref 80–99.9)
MONOCYTES NFR BLD: 0.63 K/UL (ref 0.1–0.95)
MONOCYTES NFR BLD: 8 % (ref 2–12)
NEUTROPHILS NFR BLD: 74 % (ref 43–80)
NEUTS SEG NFR BLD: 5.51 K/UL (ref 1.8–7.3)
PLATELET # BLD AUTO: 179 K/UL (ref 130–450)
PMV BLD AUTO: 9.4 FL (ref 7–12)
POTASSIUM SERPL-SCNC: 4.4 MMOL/L (ref 3.5–5.1)
PROT SERPL-MCNC: 6 G/DL (ref 6.4–8.3)
RBC # BLD AUTO: 2.35 M/UL (ref 3.8–5.8)
SODIUM SERPL-SCNC: 138 MMOL/L (ref 136–145)
WBC OTHER # BLD: 7.5 K/UL (ref 4.5–11.5)

## 2025-08-21 PROCEDURE — 2580000003 HC RX 258: Performed by: REGISTERED NURSE

## 2025-08-21 PROCEDURE — 36415 COLL VENOUS BLD VENIPUNCTURE: CPT

## 2025-08-21 PROCEDURE — 93312 ECHO TRANSESOPHAGEAL: CPT | Performed by: INTERNAL MEDICINE

## 2025-08-21 PROCEDURE — 93325 DOPPLER ECHO COLOR FLOW MAPG: CPT | Performed by: INTERNAL MEDICINE

## 2025-08-21 PROCEDURE — 97110 THERAPEUTIC EXERCISES: CPT

## 2025-08-21 PROCEDURE — 97530 THERAPEUTIC ACTIVITIES: CPT

## 2025-08-21 PROCEDURE — 1200000000 HC SEMI PRIVATE

## 2025-08-21 PROCEDURE — 6360000002 HC RX W HCPCS: Performed by: UROLOGY

## 2025-08-21 PROCEDURE — 93320 DOPPLER ECHO COMPLETE: CPT | Performed by: INTERNAL MEDICINE

## 2025-08-21 PROCEDURE — 6360000002 HC RX W HCPCS: Performed by: REGISTERED NURSE

## 2025-08-21 PROCEDURE — 6360000002 HC RX W HCPCS: Performed by: HOSPITALIST

## 2025-08-21 PROCEDURE — 80053 COMPREHEN METABOLIC PANEL: CPT

## 2025-08-21 PROCEDURE — 85025 COMPLETE CBC W/AUTO DIFF WBC: CPT

## 2025-08-21 PROCEDURE — 6370000000 HC RX 637 (ALT 250 FOR IP): Performed by: UROLOGY

## 2025-08-21 PROCEDURE — 6370000000 HC RX 637 (ALT 250 FOR IP): Performed by: HOSPITALIST

## 2025-08-21 PROCEDURE — 6370000000 HC RX 637 (ALT 250 FOR IP): Performed by: INTERNAL MEDICINE

## 2025-08-21 RX ORDER — FERROUS SULFATE 325(65) MG
325 TABLET ORAL 2 TIMES DAILY WITH MEALS
Status: DISCONTINUED | OUTPATIENT
Start: 2025-08-23 | End: 2025-08-26 | Stop reason: HOSPADM

## 2025-08-21 RX ORDER — FERROUS SULFATE 325(65) MG
325 TABLET ORAL 2 TIMES DAILY WITH MEALS
Status: DISCONTINUED | OUTPATIENT
Start: 2025-08-21 | End: 2025-08-21

## 2025-08-21 RX ADMIN — AMPICILLIN INJECTION 2000 MG: 2 POWDER, FOR SOLUTION INTRAMUSCULAR; INTRAVENOUS at 05:45

## 2025-08-21 RX ADMIN — AMPICILLIN INJECTION 2000 MG: 2 POWDER, FOR SOLUTION INTRAMUSCULAR; INTRAVENOUS at 01:34

## 2025-08-21 RX ADMIN — TROSPIUM CHLORIDE 20 MG: 20 TABLET, FILM COATED ORAL at 22:29

## 2025-08-21 RX ADMIN — IRON SUCROSE 200 MG: 20 INJECTION, SOLUTION INTRAVENOUS at 17:43

## 2025-08-21 RX ADMIN — LISINOPRIL 10 MG: 10 TABLET ORAL at 22:25

## 2025-08-21 RX ADMIN — HEPARIN SODIUM 5000 UNITS: 5000 INJECTION INTRAVENOUS; SUBCUTANEOUS at 14:48

## 2025-08-21 RX ADMIN — METOPROLOL SUCCINATE 25 MG: 25 TABLET, EXTENDED RELEASE ORAL at 10:55

## 2025-08-21 RX ADMIN — OXYCODONE AND ACETAMINOPHEN 1 TABLET: 5; 325 TABLET ORAL at 17:54

## 2025-08-21 RX ADMIN — HEPARIN SODIUM 5000 UNITS: 5000 INJECTION INTRAVENOUS; SUBCUTANEOUS at 22:26

## 2025-08-21 RX ADMIN — OXYCODONE AND ACETAMINOPHEN 1 TABLET: 5; 325 TABLET ORAL at 10:59

## 2025-08-21 RX ADMIN — AMPICILLIN INJECTION 2000 MG: 2 POWDER, FOR SOLUTION INTRAMUSCULAR; INTRAVENOUS at 09:34

## 2025-08-21 RX ADMIN — ALLOPURINOL 100 MG: 100 TABLET ORAL at 10:55

## 2025-08-21 RX ADMIN — AMPICILLIN INJECTION 2000 MG: 2 POWDER, FOR SOLUTION INTRAMUSCULAR; INTRAVENOUS at 18:11

## 2025-08-21 RX ADMIN — AMPICILLIN INJECTION 2000 MG: 2 POWDER, FOR SOLUTION INTRAMUSCULAR; INTRAVENOUS at 14:51

## 2025-08-21 RX ADMIN — AMPICILLIN INJECTION 2000 MG: 2 POWDER, FOR SOLUTION INTRAMUSCULAR; INTRAVENOUS at 22:25

## 2025-08-21 RX ADMIN — HEPARIN SODIUM 5000 UNITS: 5000 INJECTION INTRAVENOUS; SUBCUTANEOUS at 05:41

## 2025-08-21 RX ADMIN — ATORVASTATIN CALCIUM 40 MG: 40 TABLET, FILM COATED ORAL at 22:25

## 2025-08-21 ASSESSMENT — PAIN DESCRIPTION - DESCRIPTORS
DESCRIPTORS: DISCOMFORT
DESCRIPTORS: STABBING

## 2025-08-21 ASSESSMENT — PAIN DESCRIPTION - ORIENTATION: ORIENTATION: RIGHT

## 2025-08-21 ASSESSMENT — PAIN SCALES - GENERAL
PAINLEVEL_OUTOF10: 2
PAINLEVEL_OUTOF10: 6
PAINLEVEL_OUTOF10: 7
PAINLEVEL_OUTOF10: 1

## 2025-08-21 ASSESSMENT — PAIN - FUNCTIONAL ASSESSMENT
PAIN_FUNCTIONAL_ASSESSMENT: 0-10

## 2025-08-21 ASSESSMENT — PAIN DESCRIPTION - LOCATION
LOCATION: BACK;HIP
LOCATION: HIP

## 2025-08-22 LAB
ACB COMPLEX DNA BLD POS QL NAA+NON-PROBE: NOT DETECTED
ALBUMIN SERPL-MCNC: 2.5 G/DL (ref 3.5–5.2)
ALP SERPL-CCNC: 144 U/L (ref 40–129)
ALT SERPL-CCNC: 31 U/L (ref 0–50)
ANION GAP SERPL CALCULATED.3IONS-SCNC: 13 MMOL/L (ref 7–16)
AST SERPL-CCNC: 37 U/L (ref 0–50)
B FRAGILIS DNA BLD POS QL NAA+NON-PROBE: NOT DETECTED
BASOPHILS # BLD: 0.08 K/UL (ref 0–0.2)
BASOPHILS NFR BLD: 1 % (ref 0–2)
BILIRUB SERPL-MCNC: 0.4 MG/DL (ref 0–1.2)
BIOFIRE TEST COMMENT: ABNORMAL
BUN SERPL-MCNC: 26 MG/DL (ref 8–23)
C ALBICANS DNA BLD POS QL NAA+NON-PROBE: NOT DETECTED
C AURIS DNA BLD POS QL NAA+NON-PROBE: NOT DETECTED
C GATTII+NEOFOR DNA BLD POS QL NAA+N-PRB: NOT DETECTED
C GLABRATA DNA BLD POS QL NAA+NON-PROBE: NOT DETECTED
C KRUSEI DNA BLD POS QL NAA+NON-PROBE: NOT DETECTED
C PARAP DNA BLD POS QL NAA+NON-PROBE: NOT DETECTED
C TROPICLS DNA BLD POS QL NAA+NON-PROBE: NOT DETECTED
CALCIUM SERPL-MCNC: 8.8 MG/DL (ref 8.8–10.2)
CHLORIDE SERPL-SCNC: 116 MMOL/L (ref 98–107)
CO2 SERPL-SCNC: 19 MMOL/L (ref 22–29)
CREAT SERPL-MCNC: 1 MG/DL (ref 0.7–1.2)
E CLOAC COMP DNA BLD POS NAA+NON-PROBE: NOT DETECTED
E COLI DNA BLD POS QL NAA+NON-PROBE: NOT DETECTED
E FAECALIS DNA BLD POS QL NAA+NON-PROBE: DETECTED
E FAECIUM DNA BLD POS QL NAA+NON-PROBE: NOT DETECTED
ENTEROBACTERALES DNA BLD POS NAA+N-PRB: NOT DETECTED
EOSINOPHIL # BLD: 0 K/UL (ref 0.05–0.5)
EOSINOPHILS RELATIVE PERCENT: 0 % (ref 0–6)
ERYTHROCYTE [DISTWIDTH] IN BLOOD BY AUTOMATED COUNT: 14.6 % (ref 11.5–15)
GFR, ESTIMATED: 71 ML/MIN/1.73M2
GLUCOSE SERPL-MCNC: 130 MG/DL (ref 74–99)
GP B STREP DNA BLD POS QL NAA+NON-PROBE: NOT DETECTED
HAEM INFLU DNA BLD POS QL NAA+NON-PROBE: NOT DETECTED
HCT VFR BLD AUTO: 20.3 % (ref 37–54)
HCT VFR BLD AUTO: 22.3 % (ref 37–54)
HGB BLD-MCNC: 6.9 G/DL (ref 12.5–16.5)
HGB BLD-MCNC: 7.1 G/DL (ref 12.5–16.5)
K OXYTOCA DNA BLD POS QL NAA+NON-PROBE: NOT DETECTED
KLEBSIELLA SP DNA BLD POS QL NAA+NON-PRB: NOT DETECTED
KLEBSIELLA SP DNA BLD POS QL NAA+NON-PRB: NOT DETECTED
L MONOCYTOG DNA BLD POS QL NAA+NON-PROBE: NOT DETECTED
LYMPHOCYTES NFR BLD: 0.64 K/UL (ref 1.5–4)
LYMPHOCYTES RELATIVE PERCENT: 8 % (ref 20–42)
MCH RBC QN AUTO: 31.5 PG (ref 26–35)
MCHC RBC AUTO-ENTMCNC: 34 G/DL (ref 32–34.5)
MCV RBC AUTO: 92.7 FL (ref 80–99.9)
MICROORGANISM SPEC CULT: ABNORMAL
MICROORGANISM/AGENT SPEC: ABNORMAL
MONOCYTES NFR BLD: 0.24 K/UL (ref 0.1–0.95)
MONOCYTES NFR BLD: 3 % (ref 2–12)
N MEN DNA BLD POS QL NAA+NON-PROBE: NOT DETECTED
NEUTROPHILS NFR BLD: 88 % (ref 43–80)
NEUTS SEG NFR BLD: 7.04 K/UL (ref 1.8–7.3)
P AERUGINOSA DNA BLD POS NAA+NON-PROBE: NOT DETECTED
PLATELET # BLD AUTO: 180 K/UL (ref 130–450)
PMV BLD AUTO: 9.7 FL (ref 7–12)
POTASSIUM SERPL-SCNC: 4.3 MMOL/L (ref 3.5–5.1)
PROT SERPL-MCNC: 6.1 G/DL (ref 6.4–8.3)
PROTEUS SP DNA BLD POS QL NAA+NON-PROBE: NOT DETECTED
RBC # BLD AUTO: 2.19 M/UL (ref 3.8–5.8)
RBC # BLD: NORMAL 10*6/UL
S AUREUS DNA BLD POS QL NAA+NON-PROBE: NOT DETECTED
S AUREUS+CONS DNA BLD POS NAA+NON-PROBE: NOT DETECTED
S EPIDERMIDIS DNA BLD POS QL NAA+NON-PRB: NOT DETECTED
S LUGDUNENSIS DNA BLD POS QL NAA+NON-PRB: NOT DETECTED
S MALTOPHILIA DNA BLD POS QL NAA+NON-PRB: NOT DETECTED
S MARCESCENS DNA BLD POS NAA+NON-PROBE: NOT DETECTED
S PNEUM DNA BLD POS QL NAA+NON-PROBE: NOT DETECTED
S PYO DNA BLD POS QL NAA+NON-PROBE: NOT DETECTED
SALMONELLA DNA BLD POS QL NAA+NON-PROBE: NOT DETECTED
SERVICE CMNT-IMP: ABNORMAL
SODIUM SERPL-SCNC: 147 MMOL/L (ref 136–145)
SPECIMEN DESCRIPTION: ABNORMAL
STREPTOCOCCUS DNA BLD POS NAA+NON-PROBE: NOT DETECTED
VANA+VANB ISLT/SPM QL: NOT DETECTED
WBC OTHER # BLD: 8 K/UL (ref 4.5–11.5)

## 2025-08-22 PROCEDURE — 6360000002 HC RX W HCPCS: Performed by: REGISTERED NURSE

## 2025-08-22 PROCEDURE — 6370000000 HC RX 637 (ALT 250 FOR IP): Performed by: INTERNAL MEDICINE

## 2025-08-22 PROCEDURE — 85018 HEMOGLOBIN: CPT

## 2025-08-22 PROCEDURE — 6360000002 HC RX W HCPCS: Performed by: HOSPITALIST

## 2025-08-22 PROCEDURE — 86923 COMPATIBILITY TEST ELECTRIC: CPT

## 2025-08-22 PROCEDURE — 85025 COMPLETE CBC W/AUTO DIFF WBC: CPT

## 2025-08-22 PROCEDURE — 6370000000 HC RX 637 (ALT 250 FOR IP): Performed by: UROLOGY

## 2025-08-22 PROCEDURE — 86900 BLOOD TYPING SEROLOGIC ABO: CPT

## 2025-08-22 PROCEDURE — 6360000002 HC RX W HCPCS: Performed by: UROLOGY

## 2025-08-22 PROCEDURE — 85014 HEMATOCRIT: CPT

## 2025-08-22 PROCEDURE — 86850 RBC ANTIBODY SCREEN: CPT

## 2025-08-22 PROCEDURE — 2500000003 HC RX 250 WO HCPCS: Performed by: REGISTERED NURSE

## 2025-08-22 PROCEDURE — 1200000000 HC SEMI PRIVATE

## 2025-08-22 PROCEDURE — 86901 BLOOD TYPING SEROLOGIC RH(D): CPT

## 2025-08-22 PROCEDURE — 6370000000 HC RX 637 (ALT 250 FOR IP): Performed by: HOSPITALIST

## 2025-08-22 PROCEDURE — 2580000003 HC RX 258: Performed by: REGISTERED NURSE

## 2025-08-22 PROCEDURE — 80053 COMPREHEN METABOLIC PANEL: CPT

## 2025-08-22 RX ORDER — LIDOCAINE HYDROCHLORIDE 10 MG/ML
50 INJECTION, SOLUTION EPIDURAL; INFILTRATION; INTRACAUDAL; PERINEURAL ONCE
Status: COMPLETED | OUTPATIENT
Start: 2025-08-22 | End: 2025-08-23

## 2025-08-22 RX ORDER — SODIUM CHLORIDE 0.9 % (FLUSH) 0.9 %
5-40 SYRINGE (ML) INJECTION EVERY 12 HOURS SCHEDULED
Status: DISCONTINUED | OUTPATIENT
Start: 2025-08-22 | End: 2025-08-26 | Stop reason: HOSPADM

## 2025-08-22 RX ORDER — SODIUM CHLORIDE 0.9 % (FLUSH) 0.9 %
5-40 SYRINGE (ML) INJECTION PRN
Status: DISCONTINUED | OUTPATIENT
Start: 2025-08-22 | End: 2025-08-26 | Stop reason: HOSPADM

## 2025-08-22 RX ORDER — SODIUM CHLORIDE 9 MG/ML
INJECTION, SOLUTION INTRAVENOUS PRN
Status: DISCONTINUED | OUTPATIENT
Start: 2025-08-22 | End: 2025-08-26 | Stop reason: HOSPADM

## 2025-08-22 RX ADMIN — HEPARIN SODIUM 5000 UNITS: 5000 INJECTION INTRAVENOUS; SUBCUTANEOUS at 15:28

## 2025-08-22 RX ADMIN — METOPROLOL SUCCINATE 25 MG: 25 TABLET, EXTENDED RELEASE ORAL at 09:09

## 2025-08-22 RX ADMIN — OXYCODONE AND ACETAMINOPHEN 1 TABLET: 5; 325 TABLET ORAL at 09:09

## 2025-08-22 RX ADMIN — SODIUM CHLORIDE, PRESERVATIVE FREE 10 ML: 5 INJECTION INTRAVENOUS at 20:46

## 2025-08-22 RX ADMIN — HEPARIN SODIUM 5000 UNITS: 5000 INJECTION INTRAVENOUS; SUBCUTANEOUS at 22:38

## 2025-08-22 RX ADMIN — AMPICILLIN INJECTION 2000 MG: 2 POWDER, FOR SOLUTION INTRAMUSCULAR; INTRAVENOUS at 06:25

## 2025-08-22 RX ADMIN — AMPICILLIN INJECTION 2000 MG: 2 POWDER, FOR SOLUTION INTRAMUSCULAR; INTRAVENOUS at 22:32

## 2025-08-22 RX ADMIN — AMPICILLIN INJECTION 2000 MG: 2 POWDER, FOR SOLUTION INTRAMUSCULAR; INTRAVENOUS at 14:13

## 2025-08-22 RX ADMIN — ATORVASTATIN CALCIUM 40 MG: 40 TABLET, FILM COATED ORAL at 20:42

## 2025-08-22 RX ADMIN — IRON SUCROSE 200 MG: 20 INJECTION, SOLUTION INTRAVENOUS at 17:48

## 2025-08-22 RX ADMIN — AMPICILLIN INJECTION 2000 MG: 2 POWDER, FOR SOLUTION INTRAMUSCULAR; INTRAVENOUS at 02:30

## 2025-08-22 RX ADMIN — LISINOPRIL 10 MG: 10 TABLET ORAL at 20:42

## 2025-08-22 RX ADMIN — AMPICILLIN INJECTION 2000 MG: 2 POWDER, FOR SOLUTION INTRAMUSCULAR; INTRAVENOUS at 09:10

## 2025-08-22 RX ADMIN — OXYCODONE AND ACETAMINOPHEN 1 TABLET: 5; 325 TABLET ORAL at 13:45

## 2025-08-22 RX ADMIN — AMPICILLIN INJECTION 2000 MG: 2 POWDER, FOR SOLUTION INTRAMUSCULAR; INTRAVENOUS at 17:47

## 2025-08-22 RX ADMIN — HEPARIN SODIUM 5000 UNITS: 5000 INJECTION INTRAVENOUS; SUBCUTANEOUS at 05:42

## 2025-08-22 RX ADMIN — SODIUM CHLORIDE, PRESERVATIVE FREE 10 ML: 5 INJECTION INTRAVENOUS at 20:44

## 2025-08-22 RX ADMIN — ALLOPURINOL 100 MG: 100 TABLET ORAL at 09:09

## 2025-08-22 RX ADMIN — TROSPIUM CHLORIDE 20 MG: 20 TABLET, FILM COATED ORAL at 20:42

## 2025-08-22 RX ADMIN — OXYCODONE AND ACETAMINOPHEN 1 TABLET: 5; 325 TABLET ORAL at 20:42

## 2025-08-22 ASSESSMENT — PAIN SCALES - GENERAL
PAINLEVEL_OUTOF10: 4
PAINLEVEL_OUTOF10: 6
PAINLEVEL_OUTOF10: 0
PAINLEVEL_OUTOF10: 6
PAINLEVEL_OUTOF10: 2

## 2025-08-22 ASSESSMENT — PAIN - FUNCTIONAL ASSESSMENT
PAIN_FUNCTIONAL_ASSESSMENT: 0-10

## 2025-08-22 ASSESSMENT — PAIN DESCRIPTION - DESCRIPTORS
DESCRIPTORS: ACHING
DESCRIPTORS: ACHING;SORE
DESCRIPTORS: ACHING

## 2025-08-22 ASSESSMENT — PAIN DESCRIPTION - ORIENTATION
ORIENTATION: RIGHT
ORIENTATION: RIGHT

## 2025-08-22 ASSESSMENT — PAIN DESCRIPTION - LOCATION
LOCATION: FOOT
LOCATION: BACK
LOCATION: NECK;LEG

## 2025-08-23 ENCOUNTER — APPOINTMENT (OUTPATIENT)
Dept: GENERAL RADIOLOGY | Age: 85
DRG: 690 | End: 2025-08-23
Attending: UROLOGY
Payer: MEDICARE

## 2025-08-23 LAB
ALBUMIN SERPL-MCNC: 2.4 G/DL (ref 3.5–5.2)
ALP SERPL-CCNC: 144 U/L (ref 40–129)
ALT SERPL-CCNC: 29 U/L (ref 0–50)
ANION GAP SERPL CALCULATED.3IONS-SCNC: 12 MMOL/L (ref 7–16)
AST SERPL-CCNC: 34 U/L (ref 0–50)
BASOPHILS # BLD: 0.02 K/UL (ref 0–0.2)
BASOPHILS NFR BLD: 0 % (ref 0–2)
BILIRUB SERPL-MCNC: 0.4 MG/DL (ref 0–1.2)
BUN SERPL-MCNC: 26 MG/DL (ref 8–23)
CALCIUM SERPL-MCNC: 8.8 MG/DL (ref 8.8–10.2)
CHLORIDE SERPL-SCNC: 107 MMOL/L (ref 98–107)
CO2 SERPL-SCNC: 18 MMOL/L (ref 22–29)
CREAT SERPL-MCNC: 1.2 MG/DL (ref 0.7–1.2)
EOSINOPHIL # BLD: 0.09 K/UL (ref 0.05–0.5)
EOSINOPHILS RELATIVE PERCENT: 1 % (ref 0–6)
ERYTHROCYTE [DISTWIDTH] IN BLOOD BY AUTOMATED COUNT: 14.7 % (ref 11.5–15)
GFR, ESTIMATED: 59 ML/MIN/1.73M2
GLUCOSE SERPL-MCNC: 89 MG/DL (ref 74–99)
HCT VFR BLD AUTO: 20.4 % (ref 37–54)
HCT VFR BLD AUTO: 24.9 % (ref 37–54)
HGB BLD-MCNC: 6.7 G/DL (ref 12.5–16.5)
HGB BLD-MCNC: 8.1 G/DL (ref 12.5–16.5)
IMM GRANULOCYTES # BLD AUTO: 0.03 K/UL (ref 0–0.58)
IMM GRANULOCYTES NFR BLD: 0 % (ref 0–5)
LYMPHOCYTES NFR BLD: 1.11 K/UL (ref 1.5–4)
LYMPHOCYTES RELATIVE PERCENT: 14 % (ref 20–42)
MCH RBC QN AUTO: 30.5 PG (ref 26–35)
MCHC RBC AUTO-ENTMCNC: 32.8 G/DL (ref 32–34.5)
MCV RBC AUTO: 92.7 FL (ref 80–99.9)
MONOCYTES NFR BLD: 0.55 K/UL (ref 0.1–0.95)
MONOCYTES NFR BLD: 7 % (ref 2–12)
NEUTROPHILS NFR BLD: 77 % (ref 43–80)
NEUTS SEG NFR BLD: 5.94 K/UL (ref 1.8–7.3)
PLATELET # BLD AUTO: 176 K/UL (ref 130–450)
PMV BLD AUTO: 9.6 FL (ref 7–12)
POTASSIUM SERPL-SCNC: 4.5 MMOL/L (ref 3.5–5.1)
PROT SERPL-MCNC: 6 G/DL (ref 6.4–8.3)
RBC # BLD AUTO: 2.2 M/UL (ref 3.8–5.8)
SODIUM SERPL-SCNC: 137 MMOL/L (ref 136–145)
WBC OTHER # BLD: 7.7 K/UL (ref 4.5–11.5)

## 2025-08-23 PROCEDURE — 2500000003 HC RX 250 WO HCPCS: Performed by: REGISTERED NURSE

## 2025-08-23 PROCEDURE — 6370000000 HC RX 637 (ALT 250 FOR IP): Performed by: NURSE PRACTITIONER

## 2025-08-23 PROCEDURE — 71045 X-RAY EXAM CHEST 1 VIEW: CPT

## 2025-08-23 PROCEDURE — 80053 COMPREHEN METABOLIC PANEL: CPT

## 2025-08-23 PROCEDURE — C1751 CATH, INF, PER/CENT/MIDLINE: HCPCS

## 2025-08-23 PROCEDURE — 6370000000 HC RX 637 (ALT 250 FOR IP): Performed by: HOSPITALIST

## 2025-08-23 PROCEDURE — 36569 INSJ PICC 5 YR+ W/O IMAGING: CPT

## 2025-08-23 PROCEDURE — 02HV33Z INSERTION OF INFUSION DEVICE INTO SUPERIOR VENA CAVA, PERCUTANEOUS APPROACH: ICD-10-PCS | Performed by: UROLOGY

## 2025-08-23 PROCEDURE — P9016 RBC LEUKOCYTES REDUCED: HCPCS

## 2025-08-23 PROCEDURE — 1200000000 HC SEMI PRIVATE

## 2025-08-23 PROCEDURE — 36430 TRANSFUSION BLD/BLD COMPNT: CPT

## 2025-08-23 PROCEDURE — 85018 HEMOGLOBIN: CPT

## 2025-08-23 PROCEDURE — 85025 COMPLETE CBC W/AUTO DIFF WBC: CPT

## 2025-08-23 PROCEDURE — 6360000002 HC RX W HCPCS: Performed by: REGISTERED NURSE

## 2025-08-23 PROCEDURE — 6370000000 HC RX 637 (ALT 250 FOR IP): Performed by: INTERNAL MEDICINE

## 2025-08-23 PROCEDURE — 6360000002 HC RX W HCPCS: Performed by: HOSPITALIST

## 2025-08-23 PROCEDURE — 2580000003 HC RX 258: Performed by: REGISTERED NURSE

## 2025-08-23 PROCEDURE — 6360000002 HC RX W HCPCS: Performed by: UROLOGY

## 2025-08-23 PROCEDURE — 85014 HEMATOCRIT: CPT

## 2025-08-23 PROCEDURE — 6370000000 HC RX 637 (ALT 250 FOR IP): Performed by: UROLOGY

## 2025-08-23 RX ORDER — MAG HYDROX/ALUMINUM HYD/SIMETH 400-400-40
1 SUSPENSION, ORAL (FINAL DOSE FORM) ORAL ONCE
Status: COMPLETED | OUTPATIENT
Start: 2025-08-23 | End: 2025-08-23

## 2025-08-23 RX ORDER — DOCUSATE SODIUM 100 MG/1
100 CAPSULE, LIQUID FILLED ORAL 2 TIMES DAILY PRN
Status: DISCONTINUED | OUTPATIENT
Start: 2025-08-23 | End: 2025-08-25

## 2025-08-23 RX ORDER — POLYETHYLENE GLYCOL 3350 17 G/17G
17 POWDER, FOR SOLUTION ORAL DAILY
Status: DISCONTINUED | OUTPATIENT
Start: 2025-08-23 | End: 2025-08-25

## 2025-08-23 RX ORDER — SODIUM CHLORIDE 9 MG/ML
INJECTION, SOLUTION INTRAVENOUS PRN
Status: DISCONTINUED | OUTPATIENT
Start: 2025-08-23 | End: 2025-08-26 | Stop reason: HOSPADM

## 2025-08-23 RX ADMIN — HEPARIN SODIUM 5000 UNITS: 5000 INJECTION INTRAVENOUS; SUBCUTANEOUS at 05:02

## 2025-08-23 RX ADMIN — AMPICILLIN INJECTION 2000 MG: 2 POWDER, FOR SOLUTION INTRAMUSCULAR; INTRAVENOUS at 09:40

## 2025-08-23 RX ADMIN — OXYCODONE AND ACETAMINOPHEN 1 TABLET: 5; 325 TABLET ORAL at 13:01

## 2025-08-23 RX ADMIN — FERROUS SULFATE TAB 325 MG (65 MG ELEMENTAL FE) 325 MG: 325 (65 FE) TAB at 09:31

## 2025-08-23 RX ADMIN — ATORVASTATIN CALCIUM 40 MG: 40 TABLET, FILM COATED ORAL at 21:26

## 2025-08-23 RX ADMIN — LISINOPRIL 10 MG: 10 TABLET ORAL at 21:26

## 2025-08-23 RX ADMIN — POLYETHYLENE GLYCOL 3350 17 G: 17 POWDER, FOR SOLUTION ORAL at 09:31

## 2025-08-23 RX ADMIN — SODIUM CHLORIDE, PRESERVATIVE FREE 10 ML: 5 INJECTION INTRAVENOUS at 09:42

## 2025-08-23 RX ADMIN — AMPICILLIN INJECTION 2000 MG: 2 POWDER, FOR SOLUTION INTRAMUSCULAR; INTRAVENOUS at 05:32

## 2025-08-23 RX ADMIN — METOPROLOL SUCCINATE 25 MG: 25 TABLET, EXTENDED RELEASE ORAL at 09:31

## 2025-08-23 RX ADMIN — FERROUS SULFATE TAB 325 MG (65 MG ELEMENTAL FE) 325 MG: 325 (65 FE) TAB at 17:29

## 2025-08-23 RX ADMIN — AMPICILLIN INJECTION 2000 MG: 2 POWDER, FOR SOLUTION INTRAMUSCULAR; INTRAVENOUS at 02:18

## 2025-08-23 RX ADMIN — HEPARIN SODIUM 5000 UNITS: 5000 INJECTION INTRAVENOUS; SUBCUTANEOUS at 22:00

## 2025-08-23 RX ADMIN — TROSPIUM CHLORIDE 20 MG: 20 TABLET, FILM COATED ORAL at 21:26

## 2025-08-23 RX ADMIN — HEPARIN SODIUM 5000 UNITS: 5000 INJECTION INTRAVENOUS; SUBCUTANEOUS at 15:34

## 2025-08-23 RX ADMIN — ALLOPURINOL 100 MG: 100 TABLET ORAL at 09:31

## 2025-08-23 RX ADMIN — LIDOCAINE HYDROCHLORIDE 10 MG: 10 SOLUTION INTRAVENOUS at 14:55

## 2025-08-23 RX ADMIN — GLYCERIN 2 G: 2 SUPPOSITORY RECTAL at 15:43

## 2025-08-23 RX ADMIN — SODIUM CHLORIDE, PRESERVATIVE FREE 10 ML: 5 INJECTION INTRAVENOUS at 21:28

## 2025-08-23 RX ADMIN — OXYCODONE AND ACETAMINOPHEN 1 TABLET: 5; 325 TABLET ORAL at 21:26

## 2025-08-23 RX ADMIN — AMPICILLIN INJECTION 2000 MG: 2 POWDER, FOR SOLUTION INTRAMUSCULAR; INTRAVENOUS at 15:41

## 2025-08-23 RX ADMIN — IRON SUCROSE 200 MG: 20 INJECTION, SOLUTION INTRAVENOUS at 17:29

## 2025-08-23 ASSESSMENT — PAIN SCALES - GENERAL
PAINLEVEL_OUTOF10: 1
PAINLEVEL_OUTOF10: 2
PAINLEVEL_OUTOF10: 6
PAINLEVEL_OUTOF10: 5

## 2025-08-23 ASSESSMENT — PAIN - FUNCTIONAL ASSESSMENT
PAIN_FUNCTIONAL_ASSESSMENT: 0-10
PAIN_FUNCTIONAL_ASSESSMENT: PREVENTS OR INTERFERES SOME ACTIVE ACTIVITIES AND ADLS
PAIN_FUNCTIONAL_ASSESSMENT: 0-10

## 2025-08-23 ASSESSMENT — PAIN DESCRIPTION - LOCATION
LOCATION: ARM
LOCATION: BACK

## 2025-08-23 ASSESSMENT — PAIN DESCRIPTION - DESCRIPTORS
DESCRIPTORS: ACHING;DISCOMFORT;SORE
DESCRIPTORS: THROBBING

## 2025-08-23 ASSESSMENT — PAIN DESCRIPTION - ORIENTATION: ORIENTATION: LEFT

## 2025-08-24 LAB
ABO/RH: NORMAL
ALBUMIN SERPL-MCNC: 2.4 G/DL (ref 3.5–5.2)
ALP SERPL-CCNC: 139 U/L (ref 40–129)
ALT SERPL-CCNC: 25 U/L (ref 0–50)
ANION GAP SERPL CALCULATED.3IONS-SCNC: 12 MMOL/L (ref 7–16)
ANTIBODY SCREEN: NEGATIVE
ARM BAND NUMBER: NORMAL
AST SERPL-CCNC: 22 U/L (ref 0–50)
BASOPHILS # BLD: 0.05 K/UL (ref 0–0.2)
BASOPHILS NFR BLD: 1 % (ref 0–2)
BILIRUB SERPL-MCNC: 0.6 MG/DL (ref 0–1.2)
BLOOD BANK BLOOD PRODUCT EXPIRATION DATE: NORMAL
BLOOD BANK DISPENSE STATUS: NORMAL
BLOOD BANK ISBT PRODUCT BLOOD TYPE: 9500
BLOOD BANK PRODUCT CODE: NORMAL
BLOOD BANK SAMPLE EXPIRATION: NORMAL
BLOOD BANK UNIT TYPE AND RH: NORMAL
BPU ID: NORMAL
BUN SERPL-MCNC: 26 MG/DL (ref 8–23)
CALCIUM SERPL-MCNC: 8.7 MG/DL (ref 8.8–10.2)
CHLORIDE SERPL-SCNC: 108 MMOL/L (ref 98–107)
CO2 SERPL-SCNC: 20 MMOL/L (ref 22–29)
COMPONENT: NORMAL
CREAT SERPL-MCNC: 1.3 MG/DL (ref 0.7–1.2)
CROSSMATCH RESULT: NORMAL
EOSINOPHIL # BLD: 0.12 K/UL (ref 0.05–0.5)
EOSINOPHILS RELATIVE PERCENT: 1 % (ref 0–6)
ERYTHROCYTE [DISTWIDTH] IN BLOOD BY AUTOMATED COUNT: 15.5 % (ref 11.5–15)
GFR, ESTIMATED: 55 ML/MIN/1.73M2
GLUCOSE SERPL-MCNC: 80 MG/DL (ref 74–99)
HCT VFR BLD AUTO: 23.9 % (ref 37–54)
HGB BLD-MCNC: 7.6 G/DL (ref 12.5–16.5)
IMM GRANULOCYTES # BLD AUTO: 0.05 K/UL (ref 0–0.58)
IMM GRANULOCYTES NFR BLD: 1 % (ref 0–5)
LYMPHOCYTES NFR BLD: 1.19 K/UL (ref 1.5–4)
LYMPHOCYTES RELATIVE PERCENT: 14 % (ref 20–42)
MCH RBC QN AUTO: 30 PG (ref 26–35)
MCHC RBC AUTO-ENTMCNC: 31.8 G/DL (ref 32–34.5)
MCV RBC AUTO: 94.5 FL (ref 80–99.9)
MONOCYTES NFR BLD: 0.58 K/UL (ref 0.1–0.95)
MONOCYTES NFR BLD: 7 % (ref 2–12)
NEUTROPHILS NFR BLD: 77 % (ref 43–80)
NEUTS SEG NFR BLD: 6.74 K/UL (ref 1.8–7.3)
PLATELET # BLD AUTO: 201 K/UL (ref 130–450)
PMV BLD AUTO: 9.4 FL (ref 7–12)
POTASSIUM SERPL-SCNC: 4.6 MMOL/L (ref 3.5–5.1)
PROT SERPL-MCNC: 6.1 G/DL (ref 6.4–8.3)
RBC # BLD AUTO: 2.53 M/UL (ref 3.8–5.8)
SODIUM SERPL-SCNC: 139 MMOL/L (ref 136–145)
TRANSFUSION STATUS: NORMAL
UNIT DIVISION: 0
UNIT ISSUE DATE/TIME: NORMAL
WBC OTHER # BLD: 8.7 K/UL (ref 4.5–11.5)

## 2025-08-24 PROCEDURE — 6370000000 HC RX 637 (ALT 250 FOR IP): Performed by: NURSE PRACTITIONER

## 2025-08-24 PROCEDURE — 1200000000 HC SEMI PRIVATE

## 2025-08-24 PROCEDURE — 85025 COMPLETE CBC W/AUTO DIFF WBC: CPT

## 2025-08-24 PROCEDURE — 80053 COMPREHEN METABOLIC PANEL: CPT

## 2025-08-24 PROCEDURE — 2500000003 HC RX 250 WO HCPCS: Performed by: REGISTERED NURSE

## 2025-08-24 PROCEDURE — 6370000000 HC RX 637 (ALT 250 FOR IP): Performed by: INTERNAL MEDICINE

## 2025-08-24 PROCEDURE — 6370000000 HC RX 637 (ALT 250 FOR IP): Performed by: HOSPITALIST

## 2025-08-24 PROCEDURE — 2580000003 HC RX 258: Performed by: REGISTERED NURSE

## 2025-08-24 PROCEDURE — 6370000000 HC RX 637 (ALT 250 FOR IP): Performed by: UROLOGY

## 2025-08-24 PROCEDURE — 6360000002 HC RX W HCPCS: Performed by: UROLOGY

## 2025-08-24 PROCEDURE — 6360000002 HC RX W HCPCS: Performed by: REGISTERED NURSE

## 2025-08-24 RX ADMIN — POLYETHYLENE GLYCOL 3350 17 G: 17 POWDER, FOR SOLUTION ORAL at 08:14

## 2025-08-24 RX ADMIN — HEPARIN SODIUM 5000 UNITS: 5000 INJECTION INTRAVENOUS; SUBCUTANEOUS at 22:00

## 2025-08-24 RX ADMIN — ACETAMINOPHEN 650 MG: 325 TABLET ORAL at 19:36

## 2025-08-24 RX ADMIN — SODIUM CHLORIDE, PRESERVATIVE FREE 10 ML: 5 INJECTION INTRAVENOUS at 08:22

## 2025-08-24 RX ADMIN — AMPICILLIN INJECTION 2000 MG: 2 POWDER, FOR SOLUTION INTRAMUSCULAR; INTRAVENOUS at 08:20

## 2025-08-24 RX ADMIN — DOCUSATE SODIUM 100 MG: 100 CAPSULE, LIQUID FILLED ORAL at 08:14

## 2025-08-24 RX ADMIN — AMPICILLIN INJECTION 2000 MG: 2 POWDER, FOR SOLUTION INTRAMUSCULAR; INTRAVENOUS at 19:39

## 2025-08-24 RX ADMIN — HEPARIN SODIUM 5000 UNITS: 5000 INJECTION INTRAVENOUS; SUBCUTANEOUS at 14:06

## 2025-08-24 RX ADMIN — AMPICILLIN INJECTION 2000 MG: 2 POWDER, FOR SOLUTION INTRAMUSCULAR; INTRAVENOUS at 12:03

## 2025-08-24 RX ADMIN — ALLOPURINOL 100 MG: 100 TABLET ORAL at 08:14

## 2025-08-24 RX ADMIN — TROSPIUM CHLORIDE 20 MG: 20 TABLET, FILM COATED ORAL at 20:24

## 2025-08-24 RX ADMIN — SODIUM CHLORIDE, PRESERVATIVE FREE 10 ML: 5 INJECTION INTRAVENOUS at 20:25

## 2025-08-24 RX ADMIN — AMPICILLIN INJECTION 2000 MG: 2 POWDER, FOR SOLUTION INTRAMUSCULAR; INTRAVENOUS at 04:03

## 2025-08-24 RX ADMIN — FERROUS SULFATE TAB 325 MG (65 MG ELEMENTAL FE) 325 MG: 325 (65 FE) TAB at 18:09

## 2025-08-24 RX ADMIN — ATORVASTATIN CALCIUM 40 MG: 40 TABLET, FILM COATED ORAL at 20:24

## 2025-08-24 RX ADMIN — ACETAMINOPHEN 650 MG: 325 TABLET ORAL at 11:19

## 2025-08-24 RX ADMIN — AMPICILLIN INJECTION 2000 MG: 2 POWDER, FOR SOLUTION INTRAMUSCULAR; INTRAVENOUS at 16:25

## 2025-08-24 RX ADMIN — LISINOPRIL 10 MG: 10 TABLET ORAL at 20:24

## 2025-08-24 RX ADMIN — OXYCODONE AND ACETAMINOPHEN 1 TABLET: 5; 325 TABLET ORAL at 20:24

## 2025-08-24 RX ADMIN — AMPICILLIN INJECTION 2000 MG: 2 POWDER, FOR SOLUTION INTRAMUSCULAR; INTRAVENOUS at 23:42

## 2025-08-24 RX ADMIN — FERROUS SULFATE TAB 325 MG (65 MG ELEMENTAL FE) 325 MG: 325 (65 FE) TAB at 08:14

## 2025-08-24 RX ADMIN — HEPARIN SODIUM 5000 UNITS: 5000 INJECTION INTRAVENOUS; SUBCUTANEOUS at 06:14

## 2025-08-24 RX ADMIN — METOPROLOL SUCCINATE 25 MG: 25 TABLET, EXTENDED RELEASE ORAL at 08:14

## 2025-08-24 ASSESSMENT — PAIN - FUNCTIONAL ASSESSMENT
PAIN_FUNCTIONAL_ASSESSMENT: 0-10
PAIN_FUNCTIONAL_ASSESSMENT: PREVENTS OR INTERFERES SOME ACTIVE ACTIVITIES AND ADLS
PAIN_FUNCTIONAL_ASSESSMENT: 0-10
PAIN_FUNCTIONAL_ASSESSMENT: 0-10

## 2025-08-24 ASSESSMENT — PAIN SCALES - GENERAL
PAINLEVEL_OUTOF10: 6
PAINLEVEL_OUTOF10: 3
PAINLEVEL_OUTOF10: 4
PAINLEVEL_OUTOF10: 3
PAINLEVEL_OUTOF10: 8
PAINLEVEL_OUTOF10: 6

## 2025-08-24 ASSESSMENT — PAIN DESCRIPTION - DESCRIPTORS
DESCRIPTORS: ACHING;THROBBING
DESCRIPTORS: ACHING
DESCRIPTORS: ACHING;DISCOMFORT;SORE

## 2025-08-24 ASSESSMENT — PAIN DESCRIPTION - LOCATION
LOCATION: BACK;SHOULDER
LOCATION: BACK
LOCATION: BACK

## 2025-08-24 ASSESSMENT — PAIN DESCRIPTION - ORIENTATION: ORIENTATION: LEFT

## 2025-08-25 LAB
ALBUMIN SERPL-MCNC: 2.4 G/DL (ref 3.5–5.2)
ALP SERPL-CCNC: 158 U/L (ref 40–129)
ALT SERPL-CCNC: 24 U/L (ref 0–50)
ANION GAP SERPL CALCULATED.3IONS-SCNC: 10 MMOL/L (ref 7–16)
AST SERPL-CCNC: 26 U/L (ref 0–50)
BASOPHILS # BLD: 0.04 K/UL (ref 0–0.2)
BASOPHILS NFR BLD: 1 % (ref 0–2)
BILIRUB SERPL-MCNC: 0.4 MG/DL (ref 0–1.2)
BUN SERPL-MCNC: 27 MG/DL (ref 8–23)
CALCIUM SERPL-MCNC: 8.4 MG/DL (ref 8.8–10.2)
CHLORIDE SERPL-SCNC: 110 MMOL/L (ref 98–107)
CO2 SERPL-SCNC: 20 MMOL/L (ref 22–29)
CREAT SERPL-MCNC: 1.3 MG/DL (ref 0.7–1.2)
EOSINOPHIL # BLD: 0.16 K/UL (ref 0.05–0.5)
EOSINOPHILS RELATIVE PERCENT: 2 % (ref 0–6)
ERYTHROCYTE [DISTWIDTH] IN BLOOD BY AUTOMATED COUNT: 15.6 % (ref 11.5–15)
GFR, ESTIMATED: 54 ML/MIN/1.73M2
GLUCOSE SERPL-MCNC: 82 MG/DL (ref 74–99)
HCT VFR BLD AUTO: 22.9 % (ref 37–54)
HGB BLD-MCNC: 7.6 G/DL (ref 12.5–16.5)
IMM GRANULOCYTES # BLD AUTO: 0.07 K/UL (ref 0–0.58)
IMM GRANULOCYTES NFR BLD: 1 % (ref 0–5)
LYMPHOCYTES NFR BLD: 1.01 K/UL (ref 1.5–4)
LYMPHOCYTES RELATIVE PERCENT: 13 % (ref 20–42)
MCH RBC QN AUTO: 30.9 PG (ref 26–35)
MCHC RBC AUTO-ENTMCNC: 33.2 G/DL (ref 32–34.5)
MCV RBC AUTO: 93.1 FL (ref 80–99.9)
MICROORGANISM SPEC CULT: NORMAL
MONOCYTES NFR BLD: 0.63 K/UL (ref 0.1–0.95)
MONOCYTES NFR BLD: 8 % (ref 2–12)
NEUTROPHILS NFR BLD: 76 % (ref 43–80)
NEUTS SEG NFR BLD: 6.05 K/UL (ref 1.8–7.3)
PLATELET # BLD AUTO: 198 K/UL (ref 130–450)
PMV BLD AUTO: 9.7 FL (ref 7–12)
POTASSIUM SERPL-SCNC: 4.4 MMOL/L (ref 3.5–5.1)
PROT SERPL-MCNC: 5.9 G/DL (ref 6.4–8.3)
RBC # BLD AUTO: 2.46 M/UL (ref 3.8–5.8)
SERVICE CMNT-IMP: NORMAL
SODIUM SERPL-SCNC: 139 MMOL/L (ref 136–145)
SPECIMEN DESCRIPTION: NORMAL
WBC OTHER # BLD: 8 K/UL (ref 4.5–11.5)

## 2025-08-25 PROCEDURE — 2500000003 HC RX 250 WO HCPCS: Performed by: REGISTERED NURSE

## 2025-08-25 PROCEDURE — 6370000000 HC RX 637 (ALT 250 FOR IP): Performed by: NURSE PRACTITIONER

## 2025-08-25 PROCEDURE — 6360000002 HC RX W HCPCS: Performed by: UROLOGY

## 2025-08-25 PROCEDURE — 99232 SBSQ HOSP IP/OBS MODERATE 35: CPT | Performed by: NURSE PRACTITIONER

## 2025-08-25 PROCEDURE — 6370000000 HC RX 637 (ALT 250 FOR IP): Performed by: HOSPITALIST

## 2025-08-25 PROCEDURE — 6370000000 HC RX 637 (ALT 250 FOR IP): Performed by: INTERNAL MEDICINE

## 2025-08-25 PROCEDURE — 1200000000 HC SEMI PRIVATE

## 2025-08-25 PROCEDURE — 80053 COMPREHEN METABOLIC PANEL: CPT

## 2025-08-25 PROCEDURE — 2580000003 HC RX 258: Performed by: REGISTERED NURSE

## 2025-08-25 PROCEDURE — 6360000002 HC RX W HCPCS: Performed by: REGISTERED NURSE

## 2025-08-25 PROCEDURE — 85025 COMPLETE CBC W/AUTO DIFF WBC: CPT

## 2025-08-25 PROCEDURE — 6370000000 HC RX 637 (ALT 250 FOR IP): Performed by: UROLOGY

## 2025-08-25 RX ORDER — OXYCODONE HYDROCHLORIDE 15 MG/1
15 TABLET ORAL EVERY 4 HOURS PRN
Refills: 0 | Status: DISCONTINUED | OUTPATIENT
Start: 2025-08-25 | End: 2025-08-26 | Stop reason: HOSPADM

## 2025-08-25 RX ORDER — SENNA AND DOCUSATE SODIUM 50; 8.6 MG/1; MG/1
2 TABLET, FILM COATED ORAL 2 TIMES DAILY
Status: DISCONTINUED | OUTPATIENT
Start: 2025-08-25 | End: 2025-08-26 | Stop reason: HOSPADM

## 2025-08-25 RX ORDER — OXYCODONE HYDROCHLORIDE 5 MG/1
10 TABLET ORAL EVERY 4 HOURS PRN
Refills: 0 | Status: DISCONTINUED | OUTPATIENT
Start: 2025-08-25 | End: 2025-08-26

## 2025-08-25 RX ORDER — LISINOPRIL 10 MG/1
10 TABLET ORAL NIGHTLY
DISCHARGE
Start: 2025-08-25

## 2025-08-25 RX ORDER — BISACODYL 5 MG/1
10 TABLET, DELAYED RELEASE ORAL DAILY
Status: DISCONTINUED | OUTPATIENT
Start: 2025-08-25 | End: 2025-08-26 | Stop reason: HOSPADM

## 2025-08-25 RX ORDER — OXYCODONE HYDROCHLORIDE 15 MG/1
15 TABLET ORAL EVERY 4 HOURS PRN
Refills: 0 | Status: DISCONTINUED | OUTPATIENT
Start: 2025-08-25 | End: 2025-08-25

## 2025-08-25 RX ADMIN — POLYETHYLENE GLYCOL 3350 17 G: 17 POWDER, FOR SOLUTION ORAL at 08:19

## 2025-08-25 RX ADMIN — OXYCODONE AND ACETAMINOPHEN 1 TABLET: 5; 325 TABLET ORAL at 01:11

## 2025-08-25 RX ADMIN — AMPICILLIN INJECTION 2000 MG: 2 POWDER, FOR SOLUTION INTRAMUSCULAR; INTRAVENOUS at 16:24

## 2025-08-25 RX ADMIN — FERROUS SULFATE TAB 325 MG (65 MG ELEMENTAL FE) 325 MG: 325 (65 FE) TAB at 16:24

## 2025-08-25 RX ADMIN — ALLOPURINOL 100 MG: 100 TABLET ORAL at 08:19

## 2025-08-25 RX ADMIN — OXYCODONE AND ACETAMINOPHEN 1 TABLET: 5; 325 TABLET ORAL at 08:21

## 2025-08-25 RX ADMIN — AMPICILLIN INJECTION 2000 MG: 2 POWDER, FOR SOLUTION INTRAMUSCULAR; INTRAVENOUS at 08:17

## 2025-08-25 RX ADMIN — AMPICILLIN INJECTION 2000 MG: 2 POWDER, FOR SOLUTION INTRAMUSCULAR; INTRAVENOUS at 04:10

## 2025-08-25 RX ADMIN — SODIUM CHLORIDE, PRESERVATIVE FREE 10 ML: 5 INJECTION INTRAVENOUS at 08:19

## 2025-08-25 RX ADMIN — SODIUM CHLORIDE, PRESERVATIVE FREE 10 ML: 5 INJECTION INTRAVENOUS at 21:36

## 2025-08-25 RX ADMIN — HEPARIN SODIUM 5000 UNITS: 5000 INJECTION INTRAVENOUS; SUBCUTANEOUS at 21:35

## 2025-08-25 RX ADMIN — AMPICILLIN INJECTION 2000 MG: 2 POWDER, FOR SOLUTION INTRAMUSCULAR; INTRAVENOUS at 12:08

## 2025-08-25 RX ADMIN — BISACODYL 10 MG: 5 TABLET, COATED ORAL at 16:27

## 2025-08-25 RX ADMIN — AMPICILLIN INJECTION 2000 MG: 2 POWDER, FOR SOLUTION INTRAMUSCULAR; INTRAVENOUS at 23:54

## 2025-08-25 RX ADMIN — HEPARIN SODIUM 5000 UNITS: 5000 INJECTION INTRAVENOUS; SUBCUTANEOUS at 06:05

## 2025-08-25 RX ADMIN — METOPROLOL SUCCINATE 25 MG: 25 TABLET, EXTENDED RELEASE ORAL at 08:19

## 2025-08-25 RX ADMIN — OXYCODONE 15 MG: 15 TABLET ORAL at 12:11

## 2025-08-25 RX ADMIN — HEPARIN SODIUM 5000 UNITS: 5000 INJECTION INTRAVENOUS; SUBCUTANEOUS at 14:22

## 2025-08-25 RX ADMIN — OXYCODONE 15 MG: 15 TABLET ORAL at 18:02

## 2025-08-25 RX ADMIN — AMPICILLIN INJECTION 2000 MG: 2 POWDER, FOR SOLUTION INTRAMUSCULAR; INTRAVENOUS at 19:16

## 2025-08-25 RX ADMIN — FERROUS SULFATE TAB 325 MG (65 MG ELEMENTAL FE) 325 MG: 325 (65 FE) TAB at 08:19

## 2025-08-25 ASSESSMENT — PAIN DESCRIPTION - DESCRIPTORS
DESCRIPTORS: ACHING;DISCOMFORT

## 2025-08-25 ASSESSMENT — PAIN SCALES - GENERAL
PAINLEVEL_OUTOF10: 10
PAINLEVEL_OUTOF10: 0
PAINLEVEL_OUTOF10: 8
PAINLEVEL_OUTOF10: 3
PAINLEVEL_OUTOF10: 10
PAINLEVEL_OUTOF10: 9

## 2025-08-25 ASSESSMENT — PAIN DESCRIPTION - LOCATION
LOCATION: BACK;SHOULDER
LOCATION: BACK
LOCATION: BACK;HIP
LOCATION: BACK

## 2025-08-25 ASSESSMENT — PAIN - FUNCTIONAL ASSESSMENT
PAIN_FUNCTIONAL_ASSESSMENT: 0-10
PAIN_FUNCTIONAL_ASSESSMENT: PREVENTS OR INTERFERES SOME ACTIVE ACTIVITIES AND ADLS
PAIN_FUNCTIONAL_ASSESSMENT: PREVENTS OR INTERFERES SOME ACTIVE ACTIVITIES AND ADLS
PAIN_FUNCTIONAL_ASSESSMENT: 0-10
PAIN_FUNCTIONAL_ASSESSMENT: PREVENTS OR INTERFERES SOME ACTIVE ACTIVITIES AND ADLS
PAIN_FUNCTIONAL_ASSESSMENT: PREVENTS OR INTERFERES SOME ACTIVE ACTIVITIES AND ADLS

## 2025-08-25 ASSESSMENT — PAIN DESCRIPTION - ORIENTATION
ORIENTATION: LEFT;RIGHT;MID
ORIENTATION: RIGHT;LEFT;MID
ORIENTATION: LEFT;RIGHT
ORIENTATION: RIGHT;LEFT;MID

## 2025-08-26 VITALS
DIASTOLIC BLOOD PRESSURE: 60 MMHG | SYSTOLIC BLOOD PRESSURE: 154 MMHG | HEART RATE: 60 BPM | RESPIRATION RATE: 16 BRPM | WEIGHT: 190.26 LBS | HEIGHT: 71 IN | BODY MASS INDEX: 26.64 KG/M2 | TEMPERATURE: 98.1 F | OXYGEN SATURATION: 96 %

## 2025-08-26 PROBLEM — Z51.5 PALLIATIVE CARE ENCOUNTER: Status: ACTIVE | Noted: 2025-08-26

## 2025-08-26 PROBLEM — Z71.89 GOALS OF CARE, COUNSELING/DISCUSSION: Status: ACTIVE | Noted: 2025-08-26

## 2025-08-26 LAB
ALBUMIN SERPL-MCNC: 2.5 G/DL (ref 3.5–5.2)
ALP SERPL-CCNC: 165 U/L (ref 40–129)
ALT SERPL-CCNC: 22 U/L (ref 0–50)
ANION GAP SERPL CALCULATED.3IONS-SCNC: 9 MMOL/L (ref 7–16)
AST SERPL-CCNC: 25 U/L (ref 0–50)
BASOPHILS # BLD: 0.05 K/UL (ref 0–0.2)
BASOPHILS NFR BLD: 1 % (ref 0–2)
BILIRUB SERPL-MCNC: 0.5 MG/DL (ref 0–1.2)
BUN SERPL-MCNC: 26 MG/DL (ref 8–23)
CALCIUM SERPL-MCNC: 8.8 MG/DL (ref 8.8–10.2)
CHLORIDE SERPL-SCNC: 111 MMOL/L (ref 98–107)
CO2 SERPL-SCNC: 21 MMOL/L (ref 22–29)
CREAT SERPL-MCNC: 1.3 MG/DL (ref 0.7–1.2)
EOSINOPHIL # BLD: 0.17 K/UL (ref 0.05–0.5)
EOSINOPHILS RELATIVE PERCENT: 2 % (ref 0–6)
ERYTHROCYTE [DISTWIDTH] IN BLOOD BY AUTOMATED COUNT: 15.5 % (ref 11.5–15)
GFR, ESTIMATED: 52 ML/MIN/1.73M2
GLUCOSE SERPL-MCNC: 91 MG/DL (ref 74–99)
HCT VFR BLD AUTO: 25.4 % (ref 37–54)
HGB BLD-MCNC: 8 G/DL (ref 12.5–16.5)
IMM GRANULOCYTES # BLD AUTO: 0.04 K/UL (ref 0–0.58)
IMM GRANULOCYTES NFR BLD: 0 % (ref 0–5)
LYMPHOCYTES NFR BLD: 1 K/UL (ref 1.5–4)
LYMPHOCYTES RELATIVE PERCENT: 11 % (ref 20–42)
MCH RBC QN AUTO: 30.2 PG (ref 26–35)
MCHC RBC AUTO-ENTMCNC: 31.5 G/DL (ref 32–34.5)
MCV RBC AUTO: 95.8 FL (ref 80–99.9)
MONOCYTES NFR BLD: 0.57 K/UL (ref 0.1–0.95)
MONOCYTES NFR BLD: 6 % (ref 2–12)
NEUTROPHILS NFR BLD: 80 % (ref 43–80)
NEUTS SEG NFR BLD: 7.07 K/UL (ref 1.8–7.3)
PLATELET # BLD AUTO: 220 K/UL (ref 130–450)
PMV BLD AUTO: 9.3 FL (ref 7–12)
POTASSIUM SERPL-SCNC: 4.3 MMOL/L (ref 3.5–5.1)
PROT SERPL-MCNC: 6.4 G/DL (ref 6.4–8.3)
RBC # BLD AUTO: 2.65 M/UL (ref 3.8–5.8)
SODIUM SERPL-SCNC: 141 MMOL/L (ref 136–145)
WBC OTHER # BLD: 8.9 K/UL (ref 4.5–11.5)

## 2025-08-26 PROCEDURE — 85025 COMPLETE CBC W/AUTO DIFF WBC: CPT

## 2025-08-26 PROCEDURE — 6360000002 HC RX W HCPCS: Performed by: REGISTERED NURSE

## 2025-08-26 PROCEDURE — 6370000000 HC RX 637 (ALT 250 FOR IP): Performed by: NURSE PRACTITIONER

## 2025-08-26 PROCEDURE — 2500000003 HC RX 250 WO HCPCS: Performed by: REGISTERED NURSE

## 2025-08-26 PROCEDURE — 36592 COLLECT BLOOD FROM PICC: CPT

## 2025-08-26 PROCEDURE — 6360000002 HC RX W HCPCS: Performed by: NURSE PRACTITIONER

## 2025-08-26 PROCEDURE — 2580000003 HC RX 258: Performed by: REGISTERED NURSE

## 2025-08-26 PROCEDURE — 6370000000 HC RX 637 (ALT 250 FOR IP): Performed by: HOSPITALIST

## 2025-08-26 PROCEDURE — 80053 COMPREHEN METABOLIC PANEL: CPT

## 2025-08-26 PROCEDURE — 6370000000 HC RX 637 (ALT 250 FOR IP): Performed by: INTERNAL MEDICINE

## 2025-08-26 PROCEDURE — 99497 ADVNCD CARE PLAN 30 MIN: CPT | Performed by: NURSE PRACTITIONER

## 2025-08-26 RX ORDER — MORPHINE SULFATE 2 MG/ML
1 INJECTION, SOLUTION INTRAMUSCULAR; INTRAVENOUS
Status: DISCONTINUED | OUTPATIENT
Start: 2025-08-26 | End: 2025-08-26 | Stop reason: HOSPADM

## 2025-08-26 RX ORDER — LORAZEPAM 2 MG/ML
0.5 INJECTION INTRAMUSCULAR
Status: DISCONTINUED | OUTPATIENT
Start: 2025-08-26 | End: 2025-08-26 | Stop reason: HOSPADM

## 2025-08-26 RX ADMIN — OXYCODONE 15 MG: 15 TABLET ORAL at 04:29

## 2025-08-26 RX ADMIN — SODIUM CHLORIDE, PRESERVATIVE FREE 10 ML: 5 INJECTION INTRAVENOUS at 11:39

## 2025-08-26 RX ADMIN — METOPROLOL SUCCINATE 25 MG: 25 TABLET, EXTENDED RELEASE ORAL at 08:12

## 2025-08-26 RX ADMIN — AMPICILLIN INJECTION 2000 MG: 2 POWDER, FOR SOLUTION INTRAMUSCULAR; INTRAVENOUS at 04:24

## 2025-08-26 RX ADMIN — FERROUS SULFATE TAB 325 MG (65 MG ELEMENTAL FE) 325 MG: 325 (65 FE) TAB at 08:12

## 2025-08-26 RX ADMIN — BISACODYL 10 MG: 5 TABLET, COATED ORAL at 08:12

## 2025-08-26 RX ADMIN — DOCUSATE SODIUM 50 MG AND SENNOSIDES 8.6 MG 2 TABLET: 8.6; 5 TABLET, FILM COATED ORAL at 08:12

## 2025-08-26 RX ADMIN — ALLOPURINOL 100 MG: 100 TABLET ORAL at 08:12

## 2025-08-26 RX ADMIN — AMPICILLIN INJECTION 2000 MG: 2 POWDER, FOR SOLUTION INTRAMUSCULAR; INTRAVENOUS at 08:11

## 2025-08-26 RX ADMIN — SODIUM CHLORIDE, PRESERVATIVE FREE 10 ML: 5 INJECTION INTRAVENOUS at 08:11

## 2025-08-26 RX ADMIN — MORPHINE SULFATE 1 MG: 2 INJECTION, SOLUTION INTRAMUSCULAR; INTRAVENOUS at 11:37

## 2025-08-26 ASSESSMENT — PAIN SCALES - GENERAL
PAINLEVEL_OUTOF10: 0
PAINLEVEL_OUTOF10: 8

## 2025-08-26 ASSESSMENT — PAIN - FUNCTIONAL ASSESSMENT
PAIN_FUNCTIONAL_ASSESSMENT: 0-10
PAIN_FUNCTIONAL_ASSESSMENT: PREVENTS OR INTERFERES SOME ACTIVE ACTIVITIES AND ADLS

## 2025-08-26 ASSESSMENT — PAIN DESCRIPTION - DESCRIPTORS
DESCRIPTORS: DISCOMFORT;ACHING
DESCRIPTORS: ACHING;DISCOMFORT

## 2025-08-26 ASSESSMENT — PAIN DESCRIPTION - LOCATION: LOCATION: BACK

## 2025-08-26 ASSESSMENT — PAIN DESCRIPTION - ORIENTATION: ORIENTATION: LEFT;RIGHT

## (undated) DEVICE — GLOVE ORANGE PI 7 1/2   MSG9075

## (undated) DEVICE — SYRINGE 20ML LL S/C 50

## (undated) DEVICE — GOWN,SIRUS,FABRNF,XL,20/CS: Brand: MEDLINE

## (undated) DEVICE — CYSTO: Brand: MEDLINE INDUSTRIES, INC.

## (undated) DEVICE — SPECIMEN CUP W/LID: Brand: DEROYAL

## (undated) DEVICE — ELECTRODE ES 28FR WIRE 0.012IN BLU R ANG CUT LOOP STBL FOR

## (undated) DEVICE — CATHETER ETER URET L65CM OD5FR OLV TIP

## (undated) DEVICE — SOLUTION IRRIG 3000ML STRL H2O USP UROMATIC PLAS CONT

## (undated) DEVICE — SYRINGE,TOOMEY,IRRIGATION,70CC,STERILE: Brand: MEDLINE

## (undated) DEVICE — ELECTRODE ES VPR FOR USA ELITE SYS

## (undated) DEVICE — 3M™ RANGER™ FLUID WARMING IRRIGATION SET, 24750, 10/CASE: Brand: 3M™ RANGER™

## (undated) DEVICE — TUBING, SUCTION, 3/16" X 12', STRAIGHT: Brand: MEDLINE

## (undated) DEVICE — SOLUTION IRRIG 1000ML STRL H2O USP PLAS POUR BTL

## (undated) DEVICE — 4-PORT MANIFOLD: Brand: NEPTUNE 2

## (undated) DEVICE — CATHETER URETH 22FR BLLN 30CC SIL HYDRGEL 2 W F BARDX LUB

## (undated) DEVICE — MEDIA CONTRAST ISOVUE  300 10X50ML

## (undated) DEVICE — TUR/ENDOSCOPIC CABLE, 10' (3.05 M): Brand: CONMED